# Patient Record
Sex: MALE | Race: WHITE | NOT HISPANIC OR LATINO | Employment: FULL TIME | URBAN - METROPOLITAN AREA
[De-identification: names, ages, dates, MRNs, and addresses within clinical notes are randomized per-mention and may not be internally consistent; named-entity substitution may affect disease eponyms.]

---

## 2017-03-08 PROCEDURE — 82274 ASSAY TEST FOR BLOOD FECAL: CPT | Performed by: FAMILY MEDICINE

## 2017-03-09 DIAGNOSIS — Z12.11 COLON CANCER SCREENING: ICD-10-CM

## 2017-03-10 LAB — HEMOCCULT STL QL IA: POSITIVE

## 2017-03-11 ENCOUNTER — TELEPHONE (OUTPATIENT)
Dept: FAMILY MEDICINE | Facility: CLINIC | Age: 67
End: 2017-03-11

## 2017-03-11 DIAGNOSIS — R19.5 POSITIVE FIT (FECAL IMMUNOCHEMICAL TEST): Primary | ICD-10-CM

## 2017-03-11 NOTE — PROGRESS NOTES
Please call and advise patient his FIT test is positive. He should undergo a colonoscopy to further evaluate this. I will make a referral for him for that.    Andrea Aquino MD

## 2017-03-13 NOTE — TELEPHONE ENCOUNTER
Attempted to call patient at [phone], no answer.   Left VM to return call to RN triage line.     Cristina Leon RN  [facility]

## 2017-03-13 NOTE — TELEPHONE ENCOUNTER
Received the following message from Dr Aquino: Please call and advise patient his FIT test is positive. He should undergo a colonoscopy to further evaluate this. I will make a referral for him for that.     TC faxed referral to [facility].

## 2017-03-14 NOTE — TELEPHONE ENCOUNTER
Attempted to call patient at [phone] (home), no answer.  Left VM to return call to RN Triage line.    Roxi Mart RN  [facility]

## 2017-03-15 NOTE — TELEPHONE ENCOUNTER
Patient called back, I advised him of the positive FIT test and advised him of colonoscopy referral recommended.  He does not want to do this, has never had one, does not ever want to do one.  He is not able to articulate what it is that concerns him regarding having this done.  I did briefly describe the prep and test to him.    I advised patient that failing to identify colon cancer could be a fatal mistake, he verbalized understanding and continues to decline testing.  Routed to Dr. Aquino, would a repeat FIT test be advised in the near future?  Cassy Jones RN  [facility]

## 2017-03-15 NOTE — TELEPHONE ENCOUNTER
Patient called back.  Stated to call him at number below or [phone]  Zeinab Carroll RN [facility] Triage.

## 2017-03-15 NOTE — TELEPHONE ENCOUNTER
Patient called back and stated to call him back at [phone]  Zeinab Carroll RN [facility] Triage.

## 2017-03-15 NOTE — TELEPHONE ENCOUNTER
Attempted to call patient at [phone] number, left message on answering service requesting call back to clinic to discuss.  Cassy Jones RN  [facility]

## 2017-03-16 NOTE — TELEPHONE ENCOUNTER
I called the patient myself and advised him of the recommendation to undergo colonoscopy to further evaluate his positive FIT test. I was very clear that this could be due to colon cancer or something else serious. I strongly recommended the colonoscopy, but at this point he declines. I would not recommend doing another FIT test anytime soon, as I would still have the same recommendation to have him undergo a colonoscopy regardless of what the next FIT test would show in the near future.   I would just recommend another FIT test in 1 year if he chooses not to undergo the colonoscopy at this time. I asked him to call us if he changes his mind about the colonoscopy.

## 2017-09-27 ENCOUNTER — TRANSFERRED RECORDS (OUTPATIENT)
Dept: HEALTH INFORMATION MANAGEMENT | Facility: CLINIC | Age: 67
End: 2017-09-27

## 2017-11-20 ENCOUNTER — OFFICE VISIT (OUTPATIENT)
Dept: FAMILY MEDICINE | Facility: CLINIC | Age: 67
End: 2017-11-20
Payer: COMMERCIAL

## 2017-11-20 VITALS
BODY MASS INDEX: 25.62 KG/M2 | HEIGHT: 69 IN | HEART RATE: 69 BPM | WEIGHT: 173 LBS | RESPIRATION RATE: 16 BRPM | DIASTOLIC BLOOD PRESSURE: 88 MMHG | TEMPERATURE: 97 F | SYSTOLIC BLOOD PRESSURE: 138 MMHG | OXYGEN SATURATION: 99 %

## 2017-11-20 DIAGNOSIS — I48.0 PAF (PAROXYSMAL ATRIAL FIBRILLATION) (H): ICD-10-CM

## 2017-11-20 DIAGNOSIS — E78.5 HYPERLIPIDEMIA LDL GOAL <70: ICD-10-CM

## 2017-11-20 DIAGNOSIS — I25.2 OLD MYOCARDIAL INFARCTION: ICD-10-CM

## 2017-11-20 DIAGNOSIS — Z12.11 SPECIAL SCREENING FOR MALIGNANT NEOPLASMS, COLON: ICD-10-CM

## 2017-11-20 DIAGNOSIS — R39.15 URINARY URGENCY: Primary | ICD-10-CM

## 2017-11-20 DIAGNOSIS — R31.0 GROSS HEMATURIA: ICD-10-CM

## 2017-11-20 LAB
ALBUMIN UR-MCNC: NEGATIVE MG/DL
APPEARANCE UR: CLEAR
BILIRUB UR QL STRIP: NEGATIVE
COLOR UR AUTO: YELLOW
GLUCOSE UR STRIP-MCNC: NEGATIVE MG/DL
HGB UR QL STRIP: NEGATIVE
KETONES UR STRIP-MCNC: NEGATIVE MG/DL
LEUKOCYTE ESTERASE UR QL STRIP: NEGATIVE
NITRATE UR QL: NEGATIVE
PH UR STRIP: 6 PH (ref 5–7)
SOURCE: NORMAL
SP GR UR STRIP: <=1.005 (ref 1–1.03)
UROBILINOGEN UR STRIP-ACNC: 0.2 EU/DL (ref 0.2–1)

## 2017-11-20 PROCEDURE — 87086 URINE CULTURE/COLONY COUNT: CPT | Performed by: FAMILY MEDICINE

## 2017-11-20 PROCEDURE — 81003 URINALYSIS AUTO W/O SCOPE: CPT | Performed by: FAMILY MEDICINE

## 2017-11-20 PROCEDURE — 99214 OFFICE O/P EST MOD 30 MIN: CPT | Performed by: FAMILY MEDICINE

## 2017-11-20 ASSESSMENT — PAIN SCALES - GENERAL: PAINLEVEL: NO PAIN (0)

## 2017-11-20 NOTE — PROGRESS NOTES
"  SUBJECTIVE:   Andre Alas is a 67 year old male on anticoagulant for history of CVA and atrial fibrillation who presents to clinic today for the following health issues:    Genitourinary symptoms      Duration: 1 day     Description:  urgency and hematuria    Intensity:  mild    Accompanying signs and symptoms (fever/discharge/nausea/vomiting/back or abdominal pain):  None    History (frequent UTI's/kidney stones/prostate problems): enlarged prostate  Sexually active: YES    Precipitating or alleviating factors: None    Therapies tried and outcome: none   Outcome: none    ROS:  C: NEGATIVE for fever, chills, change in weight  I: NEGATIVE for worrisome rashes, moles or lesions  R: NEGATIVE for significant cough or SOB  CV: NEGATIVE for chest pain, palpitations or peripheral edema   male :hematuria and urgency   M: NEGATIVE for significant arthralgias or myalgia  N: NEGATIVE for weakness, dizziness or paresthesias  HEME/ALLERGY/IMMUNE: anticoagulation     I have reviewed the patient's medical history in detail and updated the computerized patient record.     OBJECTIVE:     /88  Pulse 69  Temp 97  F (36.1  C) (Oral)  Resp 16  Ht 5' 9.25\" (1.759 m)  Wt 173 lb (78.5 kg)  SpO2 99%  BMI 25.36 kg/m2  Body mass index is 25.36 kg/(m^2).  GENERAL: healthy, alert and no distress  EYES: Eyes grossly normal to inspection, PERRL and conjunctivae and sclerae normal  NECK: no adenopathy, no asymmetry, masses, or scars and thyroid normal to palpation  RESP: lungs clear to auscultation - no rales, rhonchi or wheezes  CV: regular rate and rhythm, normal S1 S2, no S3 or S4, no murmur, click or rub, no peripheral edema and peripheral pulses strong  ABDOMEN: soft, nontender, no hepatosplenomegaly, no masses and bowel sounds normal  MS: extremities normal- no gross deformities noted  PSYCH: mentation appears normal, affect normal/bright    Diagnostic Test Results:  Results for orders placed or performed in visit on " 11/20/17   *UA reflex to Microscopic and Culture ([place] [facility] (except [place] and [place])   Result Value Ref Range    Color Urine Yellow     Appearance Urine Clear     Glucose Urine Negative NEG^Negative mg/dL    Bilirubin Urine Negative NEG^Negative    Ketones Urine Negative NEG^Negative mg/dL    Specific Gravity Urine <=1.005 1.003 - 1.035    Blood Urine Negative NEG^Negative    pH Urine 6.0 5.0 - 7.0 pH    Protein Albumin Urine Negative NEG^Negative mg/dL    Urobilinogen Urine 0.2 0.2 - 1.0 EU/dL    Nitrite Urine Negative NEG^Negative    Leukocyte Esterase Urine Negative NEG^Negative    Source Midstream Urine        ASSESSMENT/PLAN:   (R39.15) Urinary urgency  (primary encounter diagnosis)  (R31.0) Gross hematuria  Comment: no history of smoking   Plan: UROLOGY ADULT REFERRAL, CT Abdomen Pelvis         Hematuria w/wo IV Contrast           (I25.2) Old myocardial infarction  Comment: under care of outside cardiologist   Plan: ASPIRIN NOT PRESCRIBED (INTENTIONAL), Lipid         panel reflex to direct LDL Fasting,         Comprehensive metabolic panel        Likely needs statin     (I48.0) PAF (paroxysmal atrial fibrillation) (H)  Comment: rate controlled and anticoagulated     (E78.5) Hyperlipidemia LDL goal <70  Plan: Lipid panel reflex to direct LDL Fasting, TSH         with free T4 reflex, Comprehensive metabolic         panel        Recommended follow-up     (Z12.11) Special screening for malignant neoplasms, colon  Plan: Fecal colorectal cancer screen (FIT)        Recommended colonoscopy       See Patient Instructions    Marybeth Gaffney MD  [facility]

## 2017-11-20 NOTE — NURSING NOTE
"Chief Complaint   Patient presents with     UTI       Initial /88  Pulse 69  Temp 97  F (36.1  C) (Oral)  Resp 16  Ht 5' 9.25\" (1.759 m)  Wt 173 lb (78.5 kg)  SpO2 99%  BMI 25.36 kg/m2 Estimated body mass index is 25.36 kg/(m^2) as calculated from the following:    Height as of this encounter: 5' 9.25\" (1.759 m).    Weight as of this encounter: 173 lb (78.5 kg).  Medication Reconciliation: complete   Cassy Frost CMA (AAMA)      "

## 2017-11-20 NOTE — LETTER
[facility]  [address] [place] [address]    November 21, 2017    Andre Alas  [address] [place] [address]          Dear Andre,    Your results are normal.    Enclosed is a copy of your results.     Results for orders placed or performed in visit on 11/20/17   *UA reflex to Microscopic and Culture ([place] [facility] (except [place] and [place])   Result Value Ref Range    Color Urine Yellow     Appearance Urine Clear     Glucose Urine Negative NEG^Negative mg/dL    Bilirubin Urine Negative NEG^Negative    Ketones Urine Negative NEG^Negative mg/dL    Specific Gravity Urine <=1.005 1.003 - 1.035    Blood Urine Negative NEG^Negative    pH Urine 6.0 5.0 - 7.0 pH    Protein Albumin Urine Negative NEG^Negative mg/dL    Urobilinogen Urine 0.2 0.2 - 1.0 EU/dL    Nitrite Urine Negative NEG^Negative    Leukocyte Esterase Urine Negative NEG^Negative    Source Midstream Urine    Urine Culture Aerobic Bacterial   Result Value Ref Range    Specimen Description Midstream Urine     Culture Micro No beta hemolytic Streptococcus Group A isolated        If you have any questions or concerns, please call myself or my nurse at [phone].      Sincerely,        Marybeth Gaffney MD/ha

## 2017-11-20 NOTE — PATIENT INSTRUCTIONS
Call the imaging center at [phone] or  [phone] to schedule your CT.    [facility]    If you have any questions regarding to your visit please contact your care team:       Team Red:   Clinic Hours Telephone Number   Dr. Marybeth Ortega, NP   7am-7pm  Monday - Thursday   7am-5pm  Fridays  [phone]  (Appointment scheduling available 24/7)    Questions about your visit?   Team Line  [phone]   Urgent Care - [place] - 11am-9pm Monday-Friday Saturday-Sunday- 9am-5pm   [place] - 5pm-9pm Monday-Friday Saturday-Sunday- 9am-5pm  [phone] - [facility]  [phone] - [place]       What options do I have for visits at the clinic other than the traditional office visit?  To expand how we care for you, many of our providers are utilizing electronic visits (e-visits) and telephone visits, when medically appropriate, for interactions with their patients rather than a visit in the clinic.   We also offer nurse visits for many medical concerns. Just like any other service, we will bill your insurance company for this type of visit based on time spent on the phone with your provider. Not all insurance companies cover these visits. Please check with your medical insurance if this type of visit is covered. You will be responsible for any charges that are not paid by your insurance.      E-visits via [employer]:  generally incur a $35.00 fee.  Telephone visits:  Time spent on the phone: *charged based on time that is spent on the phone in increments of 10 minutes. Estimated cost:   5-10 mins $30.00   11-20 mins. $59.00   21-30 mins. $85.00     Use [employer] (secure email communication and access to your chart) to send your primary care provider a message or make an appointment. Ask someone on your Team how to sign up for [employer].  For a Price Quote for your services, please call our Consumer Price Line at  [phone].      As always, Thank you for trusting us with your health care needs!  Discharged by DIEGO Hernandez

## 2017-11-20 NOTE — MR AVS SNAPSHOT
After Visit Summary   11/20/2017    Andre Alas    MRN: [id number]           Patient Information     Date Of Birth          1950        Visit Information        Provider Department      11/20/2017 9:00 AM Marybeth Gaffney MD [facility]        Today's Diagnoses     Urinary urgency    -  1    Gross hematuria        Old myocardial infarction        PAF (paroxysmal atrial fibrillation) (H)        Hyperlipidemia LDL goal <70        Special screening for malignant neoplasms, colon          Care Instructions    Call the imaging center at [phone] or  [phone] to schedule your CT.    [facility]    If you have any questions regarding to your visit please contact your care team:       Team Red:   Clinic Hours Telephone Number   Dr. Marybeth Ortega NP   7am-7pm  Monday - Thursday   7am-5pm  Fridays  [phone]  (Appointment scheduling available 24/7)    Questions about your visit?   Team Line  [phone]   Urgent Care - [place] and [place] - 11am-9pm Monday-Friday Saturday-Sunday- 9am-5pm   [place] - 5pm-9pm Monday-Friday Saturday-Sunday- 9am-5pm  [phone] - [place]   [phone] - [place]       What options do I have for visits at the clinic other than the traditional office visit?  To expand how we care for you, many of our providers are utilizing electronic visits (e-visits) and telephone visits, when medically appropriate, for interactions with their patients rather than a visit in the clinic.   We also offer nurse visits for many medical concerns. Just like any other service, we will bill your insurance company for this type of visit based on time spent on the phone with your provider. Not all insurance companies cover these visits. Please check with your medical insurance if this type of visit is covered. You will be responsible for any charges that are not paid by your  insurance.      E-visits via [employer]:  generally incur a $35.00 fee.  Telephone visits:  Time spent on the phone: *charged based on time that is spent on the phone in increments of 10 minutes. Estimated cost:   5-10 mins $30.00   11-20 mins. $59.00   21-30 mins. $85.00     Use [employer] (secure email communication and access to your chart) to send your primary care provider a message or make an appointment. Ask someone on your Team how to sign up for [employer].  For a Price Quote for your services, please call our [employer] Price Line at [phone].      As always, Thank you for trusting us with your health care needs!  Discharged by DIEGO Hernandez            Follow-ups after your visit        Additional Services     UROLOGY ADULT REFERRAL       Your provider has referred you to: [facility]: [facility] [place] [phone]   [email or web address]    Please be aware that coverage of these services is subject to the terms and limitations of your health insurance plan.  Call member services at your health plan with any benefit or coverage questions.      Please bring the following with you to your appointment:    (1) Any X-Rays, CTs or MRIs which have been performed.  Contact the facility where they were done to arrange for  prior to your scheduled appointment.    (2) List of current medications  (3) This referral request   (4) Any documents/labs given to you for this referral                  Follow-up notes from your care team     Return if symptoms worsen or fail to improve, for Wellness visit (fasting labs up to one week prior).      Future tests that were ordered for you today     Open Future Orders        Priority Expected Expires Ordered    CT Abdomen Pelvis Hematuria w/wo IV Contrast Routine  1/4/2018 11/20/2017    Fecal colorectal cancer screen (FIT) Routine 12/11/2017 2/12/2018 11/20/2017    Lipid panel reflex to direct LDL Fasting Routine 12/11/2017 11/20/2018 11/20/2017    TSH  "with free T4 reflex Routine 2017    Comprehensive metabolic panel Routine 2017            Who to contact     If you have questions or need follow up information about today's clinic visit or your schedule please contact [facility] directly at [phone].  Normal or non-critical lab and imaging results will be communicated to you by MyChart, letter or phone within 4 business days after the clinic has received the results. If you do not hear from us within 7 days, please contact the clinic through [employer] or phone. If you have a critical or abnormal lab result, we will notify you by phone as soon as possible.  Submit refill requests through [employer] or call your pharmacy and they will forward the refill request to us. Please allow 3 business days for your refill to be completed.          Additional Information About Your Visit        [employer] Information     [employer] lets you send messages to your doctor, view your test results, renew your prescriptions, schedule appointments and more. To sign up, go to [email or web address] . Click on \"Log in\" on the left side of the screen, which will take you to the Welcome page. Then click on \"Sign up Now\" on the right side of the page.     You will be asked to enter the access code listed below, as well as some personal information. Please follow the directions to create your username and password.     Your access code is: [email or web address]  Expires: 2018  9:59 AM     Your access code will  in 90 days. If you need help or a new code, please call your [place] clinic or [phone].        Care EveryWhere ID     This is your Care EveryWhere ID. This could be used by other organizations to access your [place] medical records  [id number]        Your Vitals Were     Pulse Temperature Respirations Height Pulse Oximetry BMI (Body Mass Index)    69 97  F (36.1  C) (Oral) 16 5' 9.25\" (1.759 m) 99% 25.36 " kg/m2       Blood Pressure from Last 3 Encounters:   11/20/17 138/88   07/07/15 132/86   07/03/15 130/79    Weight from Last 3 Encounters:   11/20/17 173 lb (78.5 kg)   07/07/15 167 lb (75.8 kg)   07/03/15 167 lb (75.8 kg)              We Performed the Following     *UA reflex to Microscopic and Culture ([place] [facility] (except [place] and [place])     Urine Culture Aerobic Bacterial     UROLOGY ADULT REFERRAL          Today's Medication Changes          These changes are accurate as of: 11/20/17  9:59 AM.  If you have any questions, ask your nurse or doctor.               Start taking these medicines.        Dose/Directions    ASPIRIN NOT PRESCRIBED   Commonly known as:  INTENTIONAL   Used for:  Old myocardial infarction   Started by:  Marybeth Gaffney MD        Please choose reason not prescribed, below   Quantity:  0 each   Refills:  0            Where to get your medicines      Some of these will need a paper prescription and others can be bought over the counter.  Ask your nurse if you have questions.     You don't need a prescription for these medications     ASPIRIN NOT PRESCRIBED                Primary Care Provider Office Phone # Fax #    Marybeth Gaffney [phone] [address] [address]        Equal Access to Services     ROBERT HINOJOSA AH: Hadii shamar ku hadasho Soomaali, waaxda luqadaha, qaybta kaalmada adeegyada, clara elise haydavida cavazos. So [facility] [phone].    ATENCIÓN: Si habla español, tiene a espino disposición servicios gratuitos de asistencia lingüística. Llame al [phone].    We comply with applicable federal civil rights laws and [place] laws. We do not discriminate on the basis of race, color, national origin, age, disability, sex, sexual orientation, or gender identity.            Thank you!     Thank you for choosing [facility]  for your care. Our goal is always to provide you with excellent care. Hearing  back from our patients is one way we can continue to improve our services. Please take a few minutes to complete the written survey that you may receive in the mail after your visit with us. Thank you!             Your Updated Medication List - Protect others around you: Learn how to safely use, store and throw away your medicines at www.disposemymeds.org.          This list is accurate as of: 11/20/17  9:59 AM.  Always use your most recent med list.                   Brand Name Dispense Instructions for use Diagnosis    ASPIRIN NOT PRESCRIBED    INTENTIONAL    0 each    Please choose reason not prescribed, below    Old myocardial infarction       rivaroxaban ANTICOAGULANT 20 MG Tabs tablet    XARELTO     Take 15 mg by mouth daily (with dinner)    PAF (paroxysmal atrial fibrillation) (H)       VITAMIN B COMPLEX PO      Take 2 tablets by mouth. Every 2 weeks        VITAMIN C PO      Take 3 tablets by mouth. Every 2 weeks        vitamin E 200 UNIT capsule      Take 2 capsules by mouth. Every 2 weeks        Zinc Gluconate 100 MG Tabs      Take 2 tablets by mouth. Every 2 weeks

## 2017-11-21 LAB
BACTERIA SPEC CULT: NORMAL
SPECIMEN SOURCE: NORMAL

## 2017-11-22 ENCOUNTER — RADIANT APPOINTMENT (OUTPATIENT)
Dept: CT IMAGING | Facility: CLINIC | Age: 67
End: 2017-11-22
Attending: FAMILY MEDICINE
Payer: COMMERCIAL

## 2017-11-22 DIAGNOSIS — R93.89 ABNORMAL CAT SCAN: ICD-10-CM

## 2017-11-22 DIAGNOSIS — R39.15 URINARY URGENCY: Primary | ICD-10-CM

## 2017-11-22 DIAGNOSIS — R31.0 GROSS HEMATURIA: ICD-10-CM

## 2017-11-22 DIAGNOSIS — R19.5 POSITIVE FIT (FECAL IMMUNOCHEMICAL TEST): ICD-10-CM

## 2017-11-22 PROCEDURE — 74178 CT ABD&PLV WO CNTR FLWD CNTR: CPT | Mod: TC

## 2017-11-22 RX ORDER — IOPAMIDOL 755 MG/ML
95 INJECTION, SOLUTION INTRAVASCULAR ONCE
Status: COMPLETED | OUTPATIENT
Start: 2017-11-22 | End: 2017-11-22

## 2017-11-22 RX ADMIN — IOPAMIDOL 95 ML: 755 INJECTION, SOLUTION INTRAVASCULAR at 10:37

## 2017-11-22 NOTE — PROGRESS NOTES
Please call patient:  No cause for blood in your urine was found. See the urologist to complete evaluation of this.     The wall of part of your colon appears thick -- you do need to follow through with colonoscopy soon to follow-up the abnormal stool card, to be sure that this is not cancer. A  will call you to arrange.     Marybeth Gaffney MD

## 2017-12-05 ENCOUNTER — OFFICE VISIT (OUTPATIENT)
Dept: UROLOGY | Facility: CLINIC | Age: 67
End: 2017-12-05
Payer: COMMERCIAL

## 2017-12-05 VITALS — RESPIRATION RATE: 12 BRPM | SYSTOLIC BLOOD PRESSURE: 130 MMHG | HEART RATE: 62 BPM | DIASTOLIC BLOOD PRESSURE: 78 MMHG

## 2017-12-05 DIAGNOSIS — N40.1 BENIGN PROSTATIC HYPERPLASIA WITH URINARY RETENTION: Primary | ICD-10-CM

## 2017-12-05 DIAGNOSIS — R33.8 BENIGN PROSTATIC HYPERPLASIA WITH URINARY RETENTION: Primary | ICD-10-CM

## 2017-12-05 DIAGNOSIS — R31.0 GROSS HEMATURIA: ICD-10-CM

## 2017-12-05 LAB
ALBUMIN UR-MCNC: NEGATIVE MG/DL
APPEARANCE UR: CLEAR
BILIRUB UR QL STRIP: NEGATIVE
COLOR UR AUTO: YELLOW
GLUCOSE UR STRIP-MCNC: NEGATIVE MG/DL
HGB UR QL STRIP: NEGATIVE
KETONES UR STRIP-MCNC: NEGATIVE MG/DL
LEUKOCYTE ESTERASE UR QL STRIP: NEGATIVE
NITRATE UR QL: NEGATIVE
PH UR STRIP: 6 PH (ref 5–7)
PSA SERPL-MCNC: 10.7 UG/L (ref 0–4)
SOURCE: NORMAL
SP GR UR STRIP: <=1.005 (ref 1–1.03)
UROBILINOGEN UR STRIP-ACNC: 0.2 EU/DL (ref 0.2–1)

## 2017-12-05 PROCEDURE — 81003 URINALYSIS AUTO W/O SCOPE: CPT | Performed by: UROLOGY

## 2017-12-05 PROCEDURE — 84153 ASSAY OF PSA TOTAL: CPT | Performed by: UROLOGY

## 2017-12-05 PROCEDURE — 36415 COLL VENOUS BLD VENIPUNCTURE: CPT | Performed by: UROLOGY

## 2017-12-05 PROCEDURE — 99204 OFFICE O/P NEW MOD 45 MIN: CPT | Mod: 25 | Performed by: UROLOGY

## 2017-12-05 PROCEDURE — 51798 US URINE CAPACITY MEASURE: CPT | Performed by: UROLOGY

## 2017-12-05 NOTE — MR AVS SNAPSHOT
After Visit Summary   12/5/2017    Andre Alas    MRN: [id number]           Patient Information     Date Of Birth          1950        Visit Information        Provider Department      12/5/2017 11:15 AM Henry Guadarrama MD [facility]        Today's Diagnoses     Benign prostatic hyperplasia with urinary retention    -  1    Gross hematuria          Care Instructions    Your next cystoscopy is scheduled 1/8/2018 @ 8:15 am Please call  if you need to reschedule this appointment.      Cystoscopy    Cystoscopy is a procedure that lets your doctor look directly inside your urethra and bladder. It can be used to:    Help diagnose a problem with your urethra, bladder, or kidneys.    Take a sample (biopsy) of bladder or urethral tissue.    Treat certain problems (such as removing kidney stones).    Place a stent to bypass an obstruction.    Take special X-rays of the kidneys.  Based on the findings, your doctor may recommend other tests or treatments.  What is a cystoscope?  A cystoscope is a telescope-like instrument that contains lenses and fiberoptics (small glass wires that make bright light). The cystoscope may be straight and rigid, or flexible to bend around curves in the urethra. The doctor may look directly into the cystoscope, or project the image onto a monitor.  Getting ready    Ask your doctor if you should stop taking any medicines before the procedure.    Ask whether you should avoid eating or drinking anything after midnight before the procedure.    Follow any other instructions your doctor gives you.  Tell your doctor before the exam if you:    Take any medicines, such as aspirin or blood thinners    Have allergies to any medicines    Are pregnant   The procedure  Cystoscopy is done in the doctor s office, surgery center, or hospital. The doctor and a nurse are present during the procedure. It takes only a few minutes, longer if a biopsy, X-ray, or  treatment needs to be done.  During the procedure:    You lie on an exam table on your back, knees bent and legs apart. You are covered with a drape.    Your urethra and the area around it are washed. Anesthetic jelly may be applied to numb the urethra. Other pain medicine is usually not needed. In some cases, you may be offered a mild sedative to help you relax. If a more extensive procedure is to be done, such as a biopsy or kidney stone removal, general anesthesia may be needed.    The cystoscope is inserted. A sterile fluid is put into the bladder to expand it. You may feel pressure from this fluid.    When the procedure is done, the cystoscope is removed.  After the procedure  If you had a sedative, general anesthesia, or spinal anesthesia, you must have someone drive you home. Once you re home:    Drink plenty of fluids.    You may have burning or light bleeding when you urinate--this is normal.    Medicines may be prescribed to ease any discomfort or prevent infection. Take these as directed.    Call your doctor if you have heavy bleeding or blood clots, burning that lasts more than a day, a fever over 100 F  (38  C), or trouble urinating.  Date Last Reviewed: 1/1/2017 2000-2017 [employer]. [address] [place] [address]. All rights reserved. This information is not intended as a substitute for professional medical care. Always follow your healthcare professional's instructions.                Follow-ups after your visit        Your next 10 appointments already scheduled     Jan 08, 2018  8:15 AM CST   Return Visit with Henry Guadarrama MD, [place] CYSTO PROC ROOM   [place] [facility] ([facility] [place]  [place] [address] [phone]              Who to contact     If you have questions or need follow up information about today's clinic visit or your schedule please contact [facility] directly at [phone].  Normal  "or non-critical lab and imaging results will be communicated to you by MyChart, letter or phone within 4 business days after the clinic has received the results. If you do not hear from us within 7 days, please contact the clinic through [employer] or phone. If you have a critical or abnormal lab result, we will notify you by phone as soon as possible.  Submit refill requests through [employer] or call your pharmacy and they will forward the refill request to us. Please allow 3 business days for your refill to be completed.          Additional Information About Your Visit        [facility] Information     [employer] lets you send messages to your doctor, view your test results, renew your prescriptions, schedule appointments and more. To sign up, go to [email or web address] . Click on \"Log in\" on the left side of the screen, which will take you to the Welcome page. Then click on \"Sign up Now\" on the right side of the page.     You will be asked to enter the access code listed below, as well as some personal information. Please follow the directions to create your username and password.     Your access code is: [email or web address]  Expires: 2018  9:59 AM     Your access code will  in 90 days. If you need help or a new code, please call your [place] clinic or [phone].        Care EveryWhere ID     This is your Care EveryWhere ID. This could be used by other organizations to access your [place] medical records  [id number]        Your Vitals Were     Pulse Respirations                62 12           Blood Pressure from Last 3 Encounters:   17 130/78   17 138/88   07/07/15 132/86    Weight from Last 3 Encounters:   17 78.5 kg (173 lb)   07/07/15 75.8 kg (167 lb)   07/03/15 75.8 kg (167 lb)              We Performed the Following     MEASURE POST-VOID RESIDUAL URINE/BLADDER CAPACITY, US NON-IMAGING     PSA tumor marker     UA reflex to Microscopic and Culture        Primary Care Provider Office Phone " # Fax #    Marybeth Gaffney [phone] [address] [facility]  [place] [address]        Equal Access to Services     USMANROBERT MICAELA : Hadii aad ku hadmarywilian Soricki, watonyda luqadaha, qashivata kaalmada jona, clara avila lachandraderic cavazos. So [facility] [phone].    ATENCIÓN: Si habla español, tiene a espino disposición servicios gratuitos de asistencia lingüística. Llame al [phone].    We comply with applicable federal civil rights laws and [place] laws. We do not discriminate on the basis of race, color, national origin, age, disability, sex, sexual orientation, or gender identity.            Thank you!     Thank you for choosing [facility]  for your care. Our goal is always to provide you with excellent care. Hearing back from our patients is one way we can continue to improve our services. Please take a few minutes to complete the written survey that you may receive in the mail after your visit with us. Thank you!             Your Updated Medication List - Protect others around you: Learn how to safely use, store and throw away your medicines at www.disposemymeds.org.          This list is accurate as of: 12/5/17 11:45 AM.  Always use your most recent med list.                   Brand Name Dispense Instructions for use Diagnosis    ASPIRIN NOT PRESCRIBED    INTENTIONAL    0 each    Please choose reason not prescribed, below    Old myocardial infarction       rivaroxaban ANTICOAGULANT 20 MG Tabs tablet    XARELTO     Take 15 mg by mouth daily (with dinner)    PAF (paroxysmal atrial fibrillation) (H)       VITAMIN B COMPLEX PO      Take 2 tablets by mouth. Every 2 weeks        VITAMIN C PO      Take 3 tablets by mouth. Every 2 weeks        vitamin E 200 UNIT capsule      Take 2 capsules by mouth. Every 2 weeks        Zinc Gluconate 100 MG Tabs      Take 2 tablets by mouth. Every 2 weeks

## 2017-12-05 NOTE — PATIENT INSTRUCTIONS
Your next cystoscopy is scheduled 1/8/2018 @ 8:15 am Please call  if you need to reschedule this appointment.      Cystoscopy    Cystoscopy is a procedure that lets your doctor look directly inside your urethra and bladder. It can be used to:    Help diagnose a problem with your urethra, bladder, or kidneys.    Take a sample (biopsy) of bladder or urethral tissue.    Treat certain problems (such as removing kidney stones).    Place a stent to bypass an obstruction.    Take special X-rays of the kidneys.  Based on the findings, your doctor may recommend other tests or treatments.  What is a cystoscope?  A cystoscope is a telescope-like instrument that contains lenses and fiberoptics (small glass wires that make bright light). The cystoscope may be straight and rigid, or flexible to bend around curves in the urethra. The doctor may look directly into the cystoscope, or project the image onto a monitor.  Getting ready    Ask your doctor if you should stop taking any medicines before the procedure.    Ask whether you should avoid eating or drinking anything after midnight before the procedure.    Follow any other instructions your doctor gives you.  Tell your doctor before the exam if you:    Take any medicines, such as aspirin or blood thinners    Have allergies to any medicines    Are pregnant   The procedure  Cystoscopy is done in the doctor s office, surgery center, or hospital. The doctor and a nurse are present during the procedure. It takes only a few minutes, longer if a biopsy, X-ray, or treatment needs to be done.  During the procedure:    You lie on an exam table on your back, knees bent and legs apart. You are covered with a drape.    Your urethra and the area around it are washed. Anesthetic jelly may be applied to numb the urethra. Other pain medicine is usually not needed. In some cases, you may be offered a mild sedative to help you relax. If a more extensive procedure is to be done, such as  a biopsy or kidney stone removal, general anesthesia may be needed.    The cystoscope is inserted. A sterile fluid is put into the bladder to expand it. You may feel pressure from this fluid.    When the procedure is done, the cystoscope is removed.  After the procedure  If you had a sedative, general anesthesia, or spinal anesthesia, you must have someone drive you home. Once you re home:    Drink plenty of fluids.    You may have burning or light bleeding when you urinate this is normal.    Medicines may be prescribed to ease any discomfort or prevent infection. Take these as directed.    Call your doctor if you have heavy bleeding or blood clots, burning that lasts more than a day, a fever over 100 F  (38  C), or trouble urinating.  Date Last Reviewed: 1/1/2017 2000-2017 [employer]. [address] [place] [address], [place] [address]. All rights reserved. This information is not intended as a substitute for professional medical care. Always follow your healthcare professional's instructions.

## 2017-12-05 NOTE — PROGRESS NOTES
"Bladder Scan performed. >616 maximum residual urine detected after 3 scans. MD informed   Erica Doty RN     Chief Complaint   Patient presents with     Hematuria     Urgency       Initial /78 (BP Location: Right arm, Patient Position: Chair, Cuff Size: Adult Large)  Pulse 62  Resp 12 Estimated body mass index is 25.36 kg/(m^2) as calculated from the following:    Height as of 11/20/17: 1.759 m (5' 9.25\").    Weight as of 11/20/17: 78.5 kg (173 lb).  Medication Reconciliation: complete   Erica Doty RN       "

## 2017-12-05 NOTE — PROGRESS NOTES
S: Andre Alas is a pleasant  67 year old male who was requested to be seen by  Marybeth Gaffney for a consult with regard to patient's urinary complaints and recently some gross hematuria.  Patient complains of Strain to Urinate, Nocturia x 1, Urgency, Frequency and Intermittency.  He has no history of elevated PSA.  Symptoms have been on going for   many years(s).  Seems to be worsened over time.  His AUA Symptom Score:  17.  His QOL score:  2.  He has noticed some gross hematuria recently also.  His UA was negative.  Recent CT urogram was negative.    Current Outpatient Prescriptions   Medication Sig Dispense Refill     ASPIRIN NOT PRESCRIBED (INTENTIONAL) Please choose reason not prescribed, below 0 each 0     rivaroxaban ANTICOAGULANT (XARELTO) 20 MG TABS tablet Take 15 mg by mouth daily (with dinner)        Ascorbic Acid (VITAMIN C PO) Take 3 tablets by mouth. Every 2 weeks       B Complex Vitamins (VITAMIN B COMPLEX PO) Take 2 tablets by mouth. Every 2 weeks       Zinc Gluconate (ZINC) 100 MG TABS Take 2 tablets by mouth. Every 2 weeks       vitamin E 200 UNIT capsule Take 2 capsules by mouth. Every 2 weeks       Allergies   Allergen Reactions     Dust Mites      Mold      Past Medical History:   Diagnosis Date     Allergic rhinitis due to dust 11/29/2011     BPH without urinary obstruction 11/29/2011     CVA (cerebral vascular accident) (H) 07/2014    embolic 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     Hyperlipidemia LDL goal <70 11/28/2011     NSTEMI (non-ST elevated myocardial infarction) (H) 7/2014    prob sec to PAF -- no sig CAD     PAF (paroxysmal atrial fibrillation) (H) 7/2014     Past Surgical History:   Procedure Laterality Date     ANGIOGRAM  2014    normal - non ST MI     DENTAL SURGERY  2000     HERNIA REPAIR, INGUINAL RT/LT  1952      Family History   Problem Relation Age of Onset     Heart Failure Mother 73     CEREBROVASCULAR DISEASE Father 70     HEART DISEASE Father 91     Pacemaker     KIDNEY DISEASE Father       stones      Cancer - colorectal No family hx of      Prostate Cancer No family hx of      DIABETES No family hx of      Hypertension No family hx of      He does not have a family history of prostate cancer.  Social History     Social History     Marital status:      Spouse name: Ludmila     Number of children: 2     Years of education: N/A     Occupational History      [employer]     Social History Main Topics     Smoking status: Never Smoker     Smokeless tobacco: Never Used     Alcohol use No     Drug use: No     Sexual activity: Yes     Partners: Female     Birth control/ protection: Condom     Other Topics Concern     Parent/Sibling W/ Cabg, Mi Or Angioplasty Before 65f 55m? No     Social History Narrative        REVIEW OF SYSTEMS  =================  C: NEGATIVE for fever, chills, change in weight  I: NEGATIVE for worrisome rashes, moles or lesions  E/M: NEGATIVE for ear, mouth and throat problems  R: NEGATIVE for significant cough or SHORTNESS OF BREATH  CV:  NEGATIVE for chest pain, palpitations or peripheral edema  GI: NEGATIVE for nausea, abdominal pain, heartburn, or change in bowel habits  NEURO: NEGATIVE numbness/weakness  : see HPI  PSYCH: NEGATIVE depression/anxiety  LYmph: no new enlarged lymph nodes  Ortho: no new trauma/movements           O: Exam:  Constitutional: healthy, alert and no distress  Head: Normocephalic. No masses, lesions, tenderness or abnormalities  ENT: ENT exam normal, no neck nodes or sinus tenderness  Cardiovascular: negative, PMI normal.   Respiratory: negative, no evidence of respiratory distress  Gastrointestinal: Abdomen soft, non-tender. BS normal. No masses, organomegaly  : penis no d/c. Testis no masses.  No scrotal skin lesion.  Prostate large 60 gm smooth no nodule.  Musculoskeletal: extremities normal- no gross deformities noted, gait normal and normal muscle tone  Skin: no suspicious lesions or rashes  Neurologic: Alert and  oriented  Psychiatric: mentation appears normal. and affect normal/bright  Hematologic/Lymphatic/Immunologic: normal ant/post cervical, axillary, supraclavicular and inguinal nodes    Assessment/Plan:   (N40.1,  R33.8) Benign prostatic hyperplasia with urinary retention  (primary encounter diagnosis)  Comment: PVR > 600 cc  Plan:  Patient is in urinary retention, probably chronic           Discussed possible TURP              (R31.0) Gross hematuria  Comment:    Plan: schedule for cysto next

## 2017-12-08 ENCOUNTER — OFFICE VISIT (OUTPATIENT)
Dept: UROLOGY | Facility: CLINIC | Age: 67
End: 2017-12-08
Payer: COMMERCIAL

## 2017-12-08 DIAGNOSIS — R97.20 ELEVATED PROSTATE SPECIFIC ANTIGEN (PSA): Primary | ICD-10-CM

## 2017-12-08 PROCEDURE — 99207 ZZC NO CHARGE NURSE ONLY: CPT

## 2017-12-08 NOTE — PROGRESS NOTES
Patient arrives for biopsy schedule.  Patient does not want to have biopsy scheduled at this time. Patient will come to scheduled appointment for cystoscopy and will discuss further with provider.   Erica Doty RN

## 2017-12-08 NOTE — MR AVS SNAPSHOT
"              After Visit Summary   12/8/2017    Andre Alas    MRN: [id number]           Patient Information     Date Of Birth          1950        Visit Information        Provider Department      12/8/2017 1:15 PM FK UROLOGY [facility]        Today's Diagnoses     Elevated prostate specific antigen (PSA)    -  1       Follow-ups after your visit        Your next 10 appointments already scheduled     Jan 08, 2018  8:15 AM CST   Return Visit with Henry Guadarrama MD, [place] CYSTO PROC ROOM   [facility] ([address] [address] [phone]              Who to contact     If you have questions or need follow up information about today's clinic visit or your schedule please contact [facility] directly at [phone].  Normal or non-critical lab and imaging results will be communicated to you by MyChart, letter or phone within 4 business days after the clinic has received the results. If you do not hear from us within 7 days, please contact the clinic through MyChart or phone. If you have a critical or abnormal lab result, we will notify you by phone as soon as possible.  Submit refill requests through [employer] or call your pharmacy and they will forward the refill request to us. Please allow 3 business days for your refill to be completed.          Additional Information About Your Visit        MyChart Information     [employer] lets you send messages to your doctor, view your test results, renew your prescriptions, schedule appointments and more. To sign up, go to [email or web address] . Click on \"Log in\" on the left side of the screen, which will take you to the Welcome page. Then click on \"Sign up Now\" on the right side of the page.     You will be asked to enter the access code listed below, as well as some personal information. Please follow the directions to create your username and password.     Your " access code is: [email or web address]  Expires: 2018  9:59 AM     Your access code will  in 90 days. If you need help or a new code, please call your [place] clinic or [phone].        Care EveryWhere ID     This is your Care EveryWhere ID. This could be used by other organizations to access your [place] medical records  [id number]         Blood Pressure from Last 3 Encounters:   17 130/78   17 138/88   07/07/15 132/86    Weight from Last 3 Encounters:   17 78.5 kg (173 lb)   07/07/15 75.8 kg (167 lb)   07/03/15 75.8 kg (167 lb)              Today, you had the following     No orders found for display       Primary Care Provider Office Phone # Fax #    Marybeth Gaffney [phone] [address] [facility] [address]        Equal Access to Services     [facility] AH: Hadii aad ku hadasho Soomaali, waaxda luqadaha, qaybta kaalmada adeegyada, waxay idiin hayaan rubyeg kharash la'davida . So [facility] [phone].    ATENCIÓN: Si habla español, tiene a espino disposición servicios gratuitos de asistencia lingüística. Llame al [phone].    We comply with applicable federal civil rights laws and [place] laws. We do not discriminate on the basis of race, color, national origin, age, disability, sex, sexual orientation, or gender identity.            Thank you!     Thank you for choosing [facility]  for your care. Our goal is always to provide you with excellent care. Hearing back from our patients is one way we can continue to improve our services. Please take a few minutes to complete the written survey that you may receive in the mail after your visit with us. Thank you!             Your Updated Medication List - Protect others around you: Learn how to safely use, store and throw away your medicines at www.disposemymeds.org.          This list is accurate as of: 17  1:38 PM.  Always use your most recent med list.                   Brand Name Dispense  Instructions for use Diagnosis    ASPIRIN NOT PRESCRIBED    INTENTIONAL    0 each    Please choose reason not prescribed, below    Old myocardial infarction       rivaroxaban ANTICOAGULANT 20 MG Tabs tablet    XARELTO     Take 15 mg by mouth daily (with dinner)    PAF (paroxysmal atrial fibrillation) (H)       VITAMIN B COMPLEX PO      Take 2 tablets by mouth. Every 2 weeks        VITAMIN C PO      Take 3 tablets by mouth. Every 2 weeks        vitamin E 200 UNIT capsule      Take 2 capsules by mouth. Every 2 weeks        Zinc Gluconate 100 MG Tabs      Take 2 tablets by mouth. Every 2 weeks

## 2017-12-27 ENCOUNTER — TRANSFERRED RECORDS (OUTPATIENT)
Dept: HEALTH INFORMATION MANAGEMENT | Facility: CLINIC | Age: 67
End: 2017-12-27

## 2018-01-08 ENCOUNTER — OFFICE VISIT (OUTPATIENT)
Dept: UROLOGY | Facility: CLINIC | Age: 68
End: 2018-01-08
Payer: COMMERCIAL

## 2018-01-08 VITALS — HEART RATE: 62 BPM | SYSTOLIC BLOOD PRESSURE: 141 MMHG | DIASTOLIC BLOOD PRESSURE: 87 MMHG | OXYGEN SATURATION: 98 %

## 2018-01-08 DIAGNOSIS — R33.8 BENIGN PROSTATIC HYPERPLASIA WITH URINARY RETENTION: ICD-10-CM

## 2018-01-08 DIAGNOSIS — N40.1 BENIGN PROSTATIC HYPERPLASIA WITH URINARY RETENTION: ICD-10-CM

## 2018-01-08 DIAGNOSIS — R31.0 GROSS HEMATURIA: ICD-10-CM

## 2018-01-08 DIAGNOSIS — R03.0 ELEVATED BLOOD PRESSURE READING WITHOUT DIAGNOSIS OF HYPERTENSION: ICD-10-CM

## 2018-01-08 DIAGNOSIS — R97.20 ELEVATED PROSTATE SPECIFIC ANTIGEN (PSA): Primary | ICD-10-CM

## 2018-01-08 PROCEDURE — 87186 SC STD MICRODIL/AGAR DIL: CPT | Performed by: UROLOGY

## 2018-01-08 PROCEDURE — 52000 CYSTOURETHROSCOPY: CPT | Performed by: UROLOGY

## 2018-01-08 PROCEDURE — 87077 CULTURE AEROBIC IDENTIFY: CPT | Performed by: UROLOGY

## 2018-01-08 PROCEDURE — 87070 CULTURE OTHR SPECIMN AEROBIC: CPT | Performed by: UROLOGY

## 2018-01-08 NOTE — PROGRESS NOTES
S: Andre Alas is a 67 year old male returns for incomplete voiding, hematuria.    Patient is draped and prepped.  Flexible cystoscopy placed under direct vision.      The anterior urethra is normal   The prostatic urethra showed trilobar enlargement.     The length is 3cm,  the coaptation is 3 cm.     In the bladder there is trabeculation grade 2.    CT ABDOMEN AND PELVIS HEMATURIA WITH/WITHOUT IV CONTRAST 11/22/2017  10:50 AM      HISTORY: Urinary urgency. Gross hematuria.      TECHNIQUE: Axial images are obtained from the lung bases to the  symphysis without IV contrast. Following the administration of 95 mL  of Isovue 370, additional axial images are obtained from the lung  bases to the symphysis during corticomedullary and excretory phases.  Coronal and sagittal reformatted images are also generated. Radiation  dose for this scan was reduced using automated exposure control,  adjustment of the mA and/or kV according to patient size, or iterative  reconstruction technique.     FINDINGS: The lung bases are clear.     Abdomen: Simple cyst is present in the left hepatic dome on series 3,  image 8 measuring 1.6 cm in diameter. Liver is otherwise unremarkable.  The spleen, gallbladder, pancreas and left adrenal gland are  unremarkable. Right adrenal nodule is indeterminate measuring 1 cm in  diameter on series 3, image 16. The remainder of the liver, spleen,  pancreas and left adrenal gland are unremarkable. Indeterminate right  adrenal nodule measures 1 cm in diameter on series 3, image 16. The  bowel is normal in caliber without obstruction or diverticulitis. Mild  circumferential colonic wall thickening is noted in the region of the  splenic flexure on series 2, image 17 and series 3, image 17.  Peristaltic contraction is possible but malignancy cannot be excluded.     Right urinary tract:  No renal masses or cysts are present. No  collecting system or ureteral stones are present. There is no  hydronephrosis.  Delayed imaging demonstrates a normal right renal  collecting system and ureter. Ureter is normal in caliber and course.     Left urinary tract:  No renal masses or cysts are present. No  collecting system or ureteral stones are present. There is no  hydronephrosis. Delayed imaging demonstrates a normal left renal  collecting system and ureter. Ureter is normal in caliber and course.     Bladder:  No bladder calculi are appreciated. No bladder wall  thickening or nodularity is appreciated. Delayed images show no  evidence of filling defects. Mass effect upon the bladder base is  caused by an enlarged prostate gland.     Pelvis:  Prostate gland is enlarged. Rectum is unremarkable. Calcified  pelvic phleboliths are present. No enlarged pelvic lymph nodes or free  fluid. Bone window examination demonstrates degenerative spine  changes.         IMPRESSION:  1. No evidence of urinary tract calculi or hydronephrosis. No renal  cyst or mass. Collecting systems, ureters and bladder are  unremarkable. Prostate gland is enlarged causing mass effect upon the  bladder base.  2. Circumferential wall thickening in the splenic flexure of the colon  possibly peristalsis but an underlying malignancy cannot be excluded.  Correlate with colonoscopy if not recently performed.  3. Simple appearing left hepatic lobe cyst. No further follow-up  required.     ANKIT ROUSE MD  Assessment/Plan:  (R97.20) Elevated prostate specific antigen (PSA)  (primary encounter diagnosis)  Comment:  Discussed psa elevation/prostate cancer  Plan: recheck psa later on this week           Tentatively scheduled for biopsy            Risks of bleeding/infection discussed    Benign prostatic hyperplasia with retention:  Discussed TURP briefly.  Needs psa evaluation first to r/o prostate cancer.        (R31.0) Gross hematuria  Comment: prostate related  Plan: prn.  He is aware of CT finding about colon.    (R03.0) Elevated blood pressure reading without diagnosis  of hypertension  Comment:    Plan:  Patient to follow up with Primary Care provider regarding elevated blood pressure.

## 2018-01-08 NOTE — MR AVS SNAPSHOT
After Visit Summary   1/8/2018    Andre Alas    MRN: [id number]           Patient Information     Date Of Birth          1950        Visit Information        Provider Department      1/8/2018 8:15 AM Henry Guadarrama MD; [facility] CYSTO PROC ROOM [facility]        Today's Diagnoses     Elevated prostate specific antigen (PSA)    -  1      Care Instructions      Prostate Needle Biopsy    Prostate needle biopsy is a test to look for prostate cancer. During the test, a thin, hollow needle is used to take small samples of tissue from the prostate. The samples are then tested in a lab.  Getting ready for the procedure  Prepare as you have been told. In addition to the following:    Tell your healthcare provider about all medicines you take. This includes herbs and other supplements. It also includes any blood thinners, such as warfarin, clopidogrel, or daily aspirin. You may need to stop taking some or all of them before the procedure.    You may be told to use a laxative, enemas, or both before the biopsy. This is to empty the colon and rectum of stool. Follow the instructions you are given.    Your healthcare provider may prescribe antibiotics before the procedure. If so, take these as directed.  Risks and possible complications   All procedures have risks. The risks of this procedure include:    Discomfort in the prostate area    Infection in the urinary tract or prostate    Infection in the bloodstream    Rectal or urinary bleeding   The day of the procedure  The procedure is done in a healthcare provider s office or a hospital. It takes about 45 minutes. You will be able to go home the same day. Transrectal ultrasound is often used during the procedure. This test uses sound waves to make images on a computer screen. The images help the healthcare provider insert the needle in the correct place. During the biopsy:    If ultrasound will be used, you may be asked to drink water to  fill your bladder.      You may lie on your side on an exam table.     The ultrasound transducer, which is about the size of a finger, is lubricated. It is then inserted into the rectum. This will feel like a prostate exam. The transducer is moved until the prostate can be seen in the ultrasound images.      To numb the biopsy area, local anesthetics may be injected. You might also be given medicine to make you sleepy.    Using the ultrasound images as a guide, the biopsy needle is inserted. It may be inserted through the rectum or through the skin between the scrotum and the anus (perineum).    The needle is used to take tissue samples from the prostate. About 12 samples are taken from different areas of the prostate. These samples are sent to a lab to be tested for cancer.  After the biopsy  At first you may feel a little lightheaded, especially if you had medicine to make you sleepy. You can lie on the table until you feel able to stand. You can go home once you are feeling better. You can go back to your normal activities. You may have some blood in your urine or stool that day. This is normal. You may also notice blood in your semen for weeks after the biopsy. This is normal and not dangerous. Your healthcare provider can tell you more about what to expect.  Follow-up care  You will see your healthcare provider for a follow-up visit. Depending on the biopsy results, you may be scheduled for more tests. If signs of cancer are found, you and your healthcare provider can discuss options for further testing.  When to call your healthcare provider  Call your healthcare provider if you have any of the following:    Blood clots in your urine    Bloody diarrhea    Blood in the urine or stool that doesn t go away after 48 hours    Chest pain or trouble breathing (call 911)    Chills    Feeling of weakness    Fever of 100.4 F (38 C) or higher, or as directed by your healthcare provider    Inability to urinate   Date Last  Reviewed: 5/1/2017 2000-2017 [employer]. [address] [place] [address], [place] [address]. All rights reserved. This information is not intended as a substitute for professional medical care. Always follow your healthcare professional's instructions.                Follow-ups after your visit        Your next 10 appointments already scheduled     Jan 12, 2018  9:30 AM CST   [facility]   [facility]    [address] [place] [id number] [phone]           Please do not eat 10-12 hours before your appointment if you are coming in fasting for labs on lipids, cholesterol, or glucose (sugar). This does not apply to pregnant women. Water, hot tea and black coffee (with nothing added) are okay. Do not drink other fluids, diet soda or chew gum.              Future tests that were ordered for you today     Open Future Orders        Priority Expected Expires Ordered    PSA tumor marker Routine 1/12/2018 1/8/2019 1/8/2018            Who to contact     If you have questions or need follow up information about today's clinic visit or your schedule please contact [facility] directly at [phone].  Normal or non-critical lab and imaging results will be communicated to you by MyChart, letter or phone within 4 business days after the clinic has received the results. If you do not hear from us within 7 days, please contact the clinic through [employer] or phone. If you have a critical or abnormal lab result, we will notify you by phone as soon as possible.  Submit refill requests through [employer] or call your pharmacy and they will forward the refill request to us. Please allow 3 business days for your refill to be completed.          Additional Information About Your Visit        [employer] Information     [employer] lets you send messages to your doctor, view your test results, renew your prescriptions, schedule appointments and more. To  "sign up, go to [email or web address] . Click on \"Log in\" on the left side of the screen, which will take you to the Welcome page. Then click on \"Sign up Now\" on the right side of the page.     You will be asked to enter the access code listed below, as well as some personal information. Please follow the directions to create your username and password.     Your access code is: [email or web address]  Expires: 2018  9:59 AM     Your access code will  in 90 days. If you need help or a new code, please call your [place] clinic or [phone].        Care EveryWhere ID     This is your Care EveryWhere ID. This could be used by other organizations to access your [place] medical records  [id number]        Your Vitals Were     Pulse Pulse Oximetry                62 98%           Blood Pressure from Last 3 Encounters:   18 141/87   17 130/78   17 138/88    Weight from Last 3 Encounters:   17 78.5 kg (173 lb)   07/07/15 75.8 kg (167 lb)   07/03/15 75.8 kg (167 lb)              We Performed the Following     Perirectal Culture Aerobic Bacterial        Primary Care Provider Office Phone # Fax #    Marybeth Gaffney [phone] [address] [facility] [address]        Equal Access to Services     [facility] AH: Hadii aad ku hadasho Soomaali, waaxda luqadaha, qaybta kaalmada adeegyada, clara pollard . So [facility] [phone].    ATENCIÓN: Si habla español, tiene a espino disposición servicios gratuitos de asistencia lingüística. Llame al [phone].    We comply with applicable federal civil rights laws and [place] laws. We do not discriminate on the basis of race, color, national origin, age, disability, sex, sexual orientation, or gender identity.            Thank you!     Thank you for choosing [facility]  for your care. Our goal is always to provide you with excellent care. Hearing back from our patients is one way we can " continue to improve our services. Please take a few minutes to complete the written survey that you may receive in the mail after your visit with us. Thank you!             Your Updated Medication List - Protect others around you: Learn how to safely use, store and throw away your medicines at www.disposemymeds.org.          This list is accurate as of: 1/8/18  9:14 AM.  Always use your most recent med list.                   Brand Name Dispense Instructions for use Diagnosis    ASPIRIN NOT PRESCRIBED    INTENTIONAL    0 each    Please choose reason not prescribed, below    Old myocardial infarction       rivaroxaban ANTICOAGULANT 20 MG Tabs tablet    XARELTO     Take 15 mg by mouth daily (with dinner)    PAF (paroxysmal atrial fibrillation) (H)       VITAMIN B COMPLEX PO      Take 2 tablets by mouth. Every 2 weeks        VITAMIN C PO      Take 3 tablets by mouth. Every 2 weeks        vitamin E 200 UNIT capsule      Take 2 capsules by mouth. Every 2 weeks        Zinc Gluconate 100 MG Tabs      Take 2 tablets by mouth. Every 2 weeks

## 2018-01-08 NOTE — PATIENT INSTRUCTIONS
Prostate Needle Biopsy    Prostate needle biopsy is a test to look for prostate cancer. During the test, a thin, hollow needle is used to take small samples of tissue from the prostate. The samples are then tested in a lab.  Getting ready for the procedure  Prepare as you have been told. In addition to the following:    Tell your healthcare provider about all medicines you take. This includes herbs and other supplements. It also includes any blood thinners, such as warfarin, clopidogrel, or daily aspirin. You may need to stop taking some or all of them before the procedure.    You may be told to use a laxative, enemas, or both before the biopsy. This is to empty the colon and rectum of stool. Follow the instructions you are given.    Your healthcare provider may prescribe antibiotics before the procedure. If so, take these as directed.  Risks and possible complications   All procedures have risks. The risks of this procedure include:    Discomfort in the prostate area    Infection in the urinary tract or prostate    Infection in the bloodstream    Rectal or urinary bleeding   The day of the procedure  The procedure is done in a healthcare provider s office or a hospital. It takes about 45 minutes. You will be able to go home the same day. Transrectal ultrasound is often used during the procedure. This test uses sound waves to make images on a computer screen. The images help the healthcare provider insert the needle in the correct place. During the biopsy:    If ultrasound will be used, you may be asked to drink water to fill your bladder.      You may lie on your side on an exam table.     The ultrasound transducer, which is about the size of a finger, is lubricated. It is then inserted into the rectum. This will feel like a prostate exam. The transducer is moved until the prostate can be seen in the ultrasound images.      To numb the biopsy area, local anesthetics may be injected. You might also be given  medicine to make you sleepy.    Using the ultrasound images as a guide, the biopsy needle is inserted. It may be inserted through the rectum or through the skin between the scrotum and the anus (perineum).    The needle is used to take tissue samples from the prostate. About 12 samples are taken from different areas of the prostate. These samples are sent to a lab to be tested for cancer.  After the biopsy  At first you may feel a little lightheaded, especially if you had medicine to make you sleepy. You can lie on the table until you feel able to stand. You can go home once you are feeling better. You can go back to your normal activities. You may have some blood in your urine or stool that day. This is normal. You may also notice blood in your semen for weeks after the biopsy. This is normal and not dangerous. Your healthcare provider can tell you more about what to expect.  Follow-up care  You will see your healthcare provider for a follow-up visit. Depending on the biopsy results, you may be scheduled for more tests. If signs of cancer are found, you and your healthcare provider can discuss options for further testing.  When to call your healthcare provider  Call your healthcare provider if you have any of the following:    Blood clots in your urine    Bloody diarrhea    Blood in the urine or stool that doesn t go away after 48 hours    Chest pain or trouble breathing (call 911)    Chills    Feeling of weakness    Fever of 100.4 F (38 C) or higher, or as directed by your healthcare provider    Inability to urinate   Date Last Reviewed: 5/1/2017 2000-2017 [employer]. [address] [place] [address], [place] [address]. All rights reserved. This information is not intended as a substitute for professional medical care. Always follow your healthcare professional's instructions.

## 2018-01-10 DIAGNOSIS — R97.20 ELEVATED PROSTATE SPECIFIC ANTIGEN (PSA): Primary | ICD-10-CM

## 2018-01-10 RX ORDER — CIPROFLOXACIN 500 MG/1
500 TABLET, FILM COATED ORAL 2 TIMES DAILY
Qty: 6 TABLET | Refills: 0 | Status: SHIPPED | OUTPATIENT
Start: 2018-01-10 | End: 2018-01-13

## 2018-01-10 RX ORDER — CEPHALEXIN 500 MG/1
500 CAPSULE ORAL 3 TIMES DAILY
Qty: 9 CAPSULE | Refills: 0 | Status: SHIPPED | OUTPATIENT
Start: 2018-01-10 | End: 2018-01-13

## 2018-01-11 LAB
BACTERIA SPEC CULT: ABNORMAL
Lab: ABNORMAL
SPECIMEN SOURCE: ABNORMAL

## 2018-01-12 DIAGNOSIS — R97.20 ELEVATED PROSTATE SPECIFIC ANTIGEN (PSA): ICD-10-CM

## 2018-01-12 PROCEDURE — 36415 COLL VENOUS BLD VENIPUNCTURE: CPT | Performed by: UROLOGY

## 2018-01-12 PROCEDURE — 84153 ASSAY OF PSA TOTAL: CPT | Performed by: UROLOGY

## 2018-01-13 LAB — PSA SERPL-MCNC: 11.9 UG/L (ref 0–4)

## 2018-02-02 ENCOUNTER — RADIANT APPOINTMENT (OUTPATIENT)
Dept: ULTRASOUND IMAGING | Facility: CLINIC | Age: 68
End: 2018-02-02
Payer: COMMERCIAL

## 2018-02-02 ENCOUNTER — OFFICE VISIT (OUTPATIENT)
Dept: UROLOGY | Facility: CLINIC | Age: 68
End: 2018-02-02
Payer: COMMERCIAL

## 2018-02-02 DIAGNOSIS — R97.20 ELEVATED PROSTATE SPECIFIC ANTIGEN (PSA): ICD-10-CM

## 2018-02-02 DIAGNOSIS — R97.20 ELEVATED PROSTATE SPECIFIC ANTIGEN (PSA): Primary | ICD-10-CM

## 2018-02-02 PROCEDURE — 88344 IMHCHEM/IMCYTCHM EA MLT ANTB: CPT | Performed by: UROLOGY

## 2018-02-02 PROCEDURE — 88305 TISSUE EXAM BY PATHOLOGIST: CPT | Performed by: UROLOGY

## 2018-02-02 PROCEDURE — 76872 US TRANSRECTAL: CPT | Performed by: UROLOGY

## 2018-02-02 PROCEDURE — 55700 HC BIOPSY PROSTATE NEEDLE/PUNCH: CPT | Performed by: UROLOGY

## 2018-02-02 PROCEDURE — 99000 SPECIMEN HANDLING OFFICE-LAB: CPT | Performed by: UROLOGY

## 2018-02-02 PROCEDURE — 76942 ECHO GUIDE FOR BIOPSY: CPT | Performed by: UROLOGY

## 2018-02-02 NOTE — PROGRESS NOTES
Patient is here for prostate biopsy.  He was placed in the dorsal lithotomy position.  Prostate ultrasound placed transrectally.  The neurovascular bundle was anesthetized using 1% lidocaine.  Prostate measurements obtained.  Prostate size is 55 cc.  12 biopsies obtained under guidance of prostate ultrasound using biopsy needle.  Patient tolerated procedure.  Return to clinic in one week for biopsy result.

## 2018-02-02 NOTE — MR AVS SNAPSHOT
"              After Visit Summary   2/2/2018    Andre Alas    MRN: [id number]           Patient Information     Date Of Birth          1950        Visit Information        Provider Department      2/2/2018 9:30 AM Henry Guadarrama MD [facility]        Today's Diagnoses     Elevated prostate specific antigen (PSA)    -  1       Follow-ups after your visit        Your next 10 appointments already scheduled     Feb 09, 2018 11:30 AM CST   Return Visit with Henry Guadarrama MD   [facility] ([address]  [place] [address] [phone]              Who to contact     If you have questions or need follow up information about today's clinic visit or your schedule please contact [facility] directly at [phone].  Normal or non-critical lab and imaging results will be communicated to you by MyChart, letter or phone within 4 business days after the clinic has received the results. If you do not hear from us within 7 days, please contact the clinic through MyChart or phone. If you have a critical or abnormal lab result, we will notify you by phone as soon as possible.  Submit refill requests through [employer] or call your pharmacy and they will forward the refill request to us. Please allow 3 business days for your refill to be completed.          Additional Information About Your Visit        MyChart Information     [employer] lets you send messages to your doctor, view your test results, renew your prescriptions, schedule appointments and more. To sign up, go to [email or web address] . Click on \"Log in\" on the left side of the screen, which will take you to the Welcome page. Then click on \"Sign up Now\" on the right side of the page.     You will be asked to enter the access code listed below, as well as some personal information. Please follow the directions to create your username and password.     Your access code is: " [email or web address]  Expires: 2018  9:59 AM     Your access code will  in 90 days. If you need help or a new code, please call your [place] clinic or [phone].        Care EveryWhere ID     This is your Care EveryWhere ID. This could be used by other organizations to access your [place] medical records  [id number]         Blood Pressure from Last 3 Encounters:   18 141/87   17 130/78   17 138/88    Weight from Last 3 Encounters:   17 173 lb (78.5 kg)   07/07/15 167 lb (75.8 kg)   07/03/15 167 lb (75.8 kg)              We Performed the Following     BIOPSY PROSTATE NEEDLE/PUNCH     CL SPECIMEN HANDLING, OFF->LAB     Surgical pathology exam        Primary Care Provider Office Phone # Fax #    Marybeth Gaffney [phone] [address] [facility] [address]        Equal Access to Services     ROBERT HINOJOSA : Hadii aad ku hadasho Soomaali, waaxda luqadaha, qaybta kaalmada adeegyada, waxay idiin hayrubénn roque pollard . So [facility] [phone].    ATENCIÓN: Si habla español, tiene a espino disposición servicios gratuitos de asistencia lingüística. Llame al [phone].    We comply with applicable federal civil rights laws and [place] laws. We do not discriminate on the basis of race, color, national origin, age, disability, sex, sexual orientation, or gender identity.            Thank you!     Thank you for choosing [facility]  for your care. Our goal is always to provide you with excellent care. Hearing back from our patients is one way we can continue to improve our services. Please take a few minutes to complete the written survey that you may receive in the mail after your visit with us. Thank you!             Your Updated Medication List - Protect others around you: Learn how to safely use, store and throw away your medicines at www.disposemymeds.org.          This list is accurate as of 18 10:01 AM.  Always use your most recent med  list.                   Brand Name Dispense Instructions for use Diagnosis    ASPIRIN NOT PRESCRIBED    INTENTIONAL    0 each    Please choose reason not prescribed, below    Old myocardial infarction       rivaroxaban ANTICOAGULANT 20 MG Tabs tablet    XARELTO     Take 15 mg by mouth daily (with dinner)    PAF (paroxysmal atrial fibrillation) (H)       VITAMIN B COMPLEX PO      Take 2 tablets by mouth. Every 2 weeks        VITAMIN C PO      Take 3 tablets by mouth. Every 2 weeks        vitamin E 200 UNIT capsule      Take 2 capsules by mouth. Every 2 weeks        Zinc Gluconate 100 MG Tabs      Take 2 tablets by mouth. Every 2 weeks

## 2018-02-06 LAB — COPATH REPORT: NORMAL

## 2018-02-09 ENCOUNTER — OFFICE VISIT (OUTPATIENT)
Dept: UROLOGY | Facility: CLINIC | Age: 68
End: 2018-02-09
Payer: COMMERCIAL

## 2018-02-09 DIAGNOSIS — C61 MALIGNANT NEOPLASM OF PROSTATE (H): Primary | ICD-10-CM

## 2018-02-09 DIAGNOSIS — N40.1 BENIGN PROSTATIC HYPERPLASIA WITH URINARY RETENTION: ICD-10-CM

## 2018-02-09 DIAGNOSIS — R33.8 BENIGN PROSTATIC HYPERPLASIA WITH URINARY RETENTION: ICD-10-CM

## 2018-02-09 PROBLEM — R19.5 POSITIVE FIT (FECAL IMMUNOCHEMICAL TEST): Status: ACTIVE | Noted: 2017-11-20

## 2018-02-09 PROCEDURE — 51798 US URINE CAPACITY MEASURE: CPT | Performed by: UROLOGY

## 2018-02-09 PROCEDURE — 99214 OFFICE O/P EST MOD 30 MIN: CPT | Mod: 25 | Performed by: UROLOGY

## 2018-02-09 NOTE — MR AVS SNAPSHOT
"              After Visit Summary   2/9/2018    Andre Alas    MRN: [id number]           Patient Information     Date Of Birth          1950        Visit Information        Provider Department      2/9/2018 11:30 AM Henry Guadarrama MD [facility]        Today's Diagnoses     Malignant neoplasm of prostate (H)    -  1    Benign prostatic hyperplasia with urinary retention           Follow-ups after your visit        Future tests that were ordered for you today     Open Future Orders        Priority Expected Expires Ordered    PSA tumor marker Routine 8/11/2018 2/10/2019 2/9/2018            Who to contact     If you have questions or need follow up information about today's clinic visit or your schedule please contact [facility] directly at [phone].  Normal or non-critical lab and imaging results will be communicated to you by MyChart, letter or phone within 4 business days after the clinic has received the results. If you do not hear from us within 7 days, please contact the clinic through [employer] or phone. If you have a critical or abnormal lab result, we will notify you by phone as soon as possible.  Submit refill requests through "[employer] or call your pharmacy and they will forward the refill request to us. Please allow 3 business days for your refill to be completed.          Additional Information About Your Visit        [employer] Information     "[employer] lets you send messages to your doctor, view your test results, renew your prescriptions, schedule appointments and more. To sign up, go to [email or web address] . Click on \"Log in\" on the left side of the screen, which will take you to the Welcome page. Then click on \"Sign up Now\" on the right side of the page.     You will be asked to enter the access code listed below, as well as some personal information. Please follow the directions to create your username and password.     Your access code is: " [email or web address]  Expires: 2018  9:59 AM     Your access code will  in 90 days. If you need help or a new code, please call your [facility] clinic or [phone].        Care EveryWhere ID     This is your Care EveryWhere ID. This could be used by other organizations to access your [facility] medical records  [id number]         Blood Pressure from Last 3 Encounters:   18 141/87   17 130/78   17 138/88    Weight from Last 3 Encounters:   17 173 lb (78.5 kg)   07/07/15 167 lb (75.8 kg)   07/03/15 167 lb (75.8 kg)              We Performed the Following     MEASURE POST-VOID RESIDUAL URINE/BLADDER CAPACITY, US NON-IMAGING        Primary Care Provider Office Phone # Fax #    Marybeth Gaffney [phone]       [address] [facility] [address]        Equal Access to Services     : Hadii aad ku hadasho Soomaali, waaxda luqadaha, qaybta kaalmada adeegyada, waxay idiin hayaan adeeg kharacary la'davida . [facility] [phone].    ATENCIÓN: Si habla español, tiene a espino disposición servicios gratuitos de asistencia lingüística. Llame al [phone].    We comply with applicable federal civil rights laws and [place] laws. We do not discriminate on the basis of race, color, national origin, age, disability, sex, sexual orientation, or gender identity.            Thank you!     Thank you for choosing [facility]  for your care. Our goal is always to provide you with excellent care. Hearing back from our patients is one way we can continue to improve our services. Please take a few minutes to complete the written survey that you may receive in the mail after your visit with us. Thank you!             Your Updated Medication List - Protect others around you: Learn how to safely use, store and throw away your medicines at www.disposemymeds.org.          This list is accurate as of 18 12:12 PM.  Always use your most recent med list.                    Brand Name Dispense Instructions for use Diagnosis    ASPIRIN NOT PRESCRIBED    INTENTIONAL    0 each    Please choose reason not prescribed, below    Old myocardial infarction       rivaroxaban ANTICOAGULANT 20 MG Tabs tablet    XARELTO     Take 15 mg by mouth daily (with dinner)    PAF (paroxysmal atrial fibrillation) (H)       VITAMIN B COMPLEX PO      Take 2 tablets by mouth. Every 2 weeks        VITAMIN C PO      Take 3 tablets by mouth. Every 2 weeks        vitamin E 200 UNIT capsule      Take 2 capsules by mouth. Every 2 weeks        Zinc Gluconate 100 MG Tabs      Take 2 tablets by mouth. Every 2 weeks

## 2018-02-09 NOTE — PROGRESS NOTES
Chief Complaint   Patient presents with     RECHECK       Andre Alas is a 67 year old male who presents today for follow up of   Chief Complaint   Patient presents with     RECHECK    f/u post biopsy for elevated psa.  He is without any complaints.  He has LUTS with retention of urine.    Current Outpatient Prescriptions   Medication Sig Dispense Refill     ASPIRIN NOT PRESCRIBED (INTENTIONAL) Please choose reason not prescribed, below 0 each 0     rivaroxaban ANTICOAGULANT (XARELTO) 20 MG TABS tablet Take 15 mg by mouth daily (with dinner)        Ascorbic Acid (VITAMIN C PO) Take 3 tablets by mouth. Every 2 weeks       B Complex Vitamins (VITAMIN B COMPLEX PO) Take 2 tablets by mouth. Every 2 weeks       Zinc Gluconate (ZINC) 100 MG TABS Take 2 tablets by mouth. Every 2 weeks       vitamin E 200 UNIT capsule Take 2 capsules by mouth. 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     Hyperlipidemia LDL goal <70 11/28/2011     NSTEMI (non-ST elevated myocardial infarction) (H) 7/2014    prob sec to PAF -- no sig CAD     PAF (paroxysmal atrial fibrillation) (H) 7/2014     Past Surgical History:   Procedure Laterality Date     ANGIOGRAM  2014    normal - non ST MI     DENTAL SURGERY  2000     HERNIA REPAIR, INGUINAL RT/LT  1952     Family History   Problem Relation Age of Onset     Heart Failure Mother 73     CEREBROVASCULAR DISEASE Father 70     HEART DISEASE Father 91     Pacemaker     KIDNEY DISEASE Father      stones      Cancer - colorectal No family hx of      Prostate Cancer No family hx of      DIABETES No family hx of      Hypertension No family hx of      Social History     Social History     Marital status:      Spouse name: Ludmila     Number of children: 2      Years of education: N/A     Occupational History      [employer]     Social History Main Topics     Smoking status: Never Smoker     Smokeless tobacco: Never Used     Alcohol use No     Drug use: No     Sexual activity: Yes     Partners: Female     Birth control/ protection: Condom     Other Topics Concern     Parent/Sibling W/ Cabg, Mi Or Angioplasty Before 65f 55m? No     Social History Narrative       REVIEW OF SYSTEMS  =================  C: NEGATIVE for fever, chills, change in weight  I: NEGATIVE for worrisome rashes, moles or lesions  E/M: NEGATIVE for ear, mouth and throat problems  R: NEGATIVE for significant cough or SHORTNESS OF BREATH,   CV: NEGATIVE for chest pain, palpitations or peripheral edema  GI: NEGATIVE for nausea, abdominal pain, heartburn, or change in bowel habits  NEURO: NEGATIVE any motor/sensory changes  PSYCH: NEGATIVE for recent mood disorder    Physical Exam:  There were no vitals taken for this visit.   Patient is pleasant, in no acute distress, good general condition.  Lung: no evidence of respiratory distress    Abdomen: Soft, nondistended, non tender. No masses. No rebound or guarding.   Exam: bladder scan >500 cc  Skin: Warm and dry.  No redness.  Psych: normal mood and affect  Neuro: alert and oriented  Patient Name: GABINO CLANCY   MR#: [id number]   Specimen #:    Collected: 2/2/2018   Received: 2/2/2018   Reported: 2/6/2018 17:44   Ordering Phy(s): DANNA JURADO     For improved result formatting, select 'View Enhanced Report Format' under    Linked Documents section.     SPECIMEN(S):   A: Prostate biopsy, right   B: Prostate biopsy, left     FINAL DIAGNOSIS:   A. Prostate biopsy, right:   - Prostatic adenocarcinoma:     - WHO Grade Group 1  (see comment)        - [place] score 6, patterns 3+3     - Proportion of prostatic tissue involved by tumor:  5-7%     - Number of tissue cores involved by tumor: 4 of multiple.     - Perineural  invasion:  None seen.     - Angiolymphatic invasion:  None seen.     B. Prostate biopsy, left:   - Prostatic adenocarcinoma:     - WHO Grade Group 1  (see comment)        - Aleksandar score 6, patterns 3+3     - Proportion of prostatic tissue involved by tumor:  5%     - Number of tissue cores involved by tumor: 1 of multiple.     - Perineural invasion:  None seen.     - Angiolymphatic invasion:  None seen.     COMMENT:   The template for prostate cancer on needle biopsy was adapted from the   following references.     In 2016 the World Health Organization adopted a new system for grading   prostate cancer (reference 2) with the   benefits of (a) more accurate grade stratification than the current   [place] system; (b) simplified grading   system of 5 opposed to multiple possible scores depending on various   [place]ato pattern combinations; (c) lowest   grade 1 as opposed to current practice of Aleksandar score 6, with the   potential to reduce overtreatment of   indolent prostate cancer.     The new grading scheme correlates with the prior [place]ato grading system as    follows:     Grade Group 1 = Aleksandar score 6 or less   Grade Group 2 = [place] score 3+4=7   Grade Group 3 - Aleksandar score 4+3 = 7   Grade Group 4 = Aleksandar score 8   Grade Group 5 = Aleksandar score 9-10     REFERENCES   (1) College of American Pathologists Cancer Protocols and Checklists, June 2012.     (2) Pj JI, Ezra MJ, Asmita DD, et al. A contemporary prostate   cancer grading system: A validating   alternative to the Cokato score. Eur Urol (2015)     Electronically signed out by:     ROLA Allen M.D.     CLINICAL HISTORY:   57-year-old male with elevated prostatic specific antigen (PSA).     GROSS:   Two specimen containers with formalin are received labeled with the   patient's name, date of birth, and medical   record number.  Information on the requisition slip, containers, and   associated labels is confirmed.     A. The specimen is  "designated \"right prostate\" consisting of seven needle   biopsies which range from 0.6-1.2 cm   in length.  Additionally in the container are multiple fragments of tissue    measuring up to 0.4 cm in   aggregate.  The entire specimen is placed on a tissue wrap.  All submitted    in one cassette.     B.  The specimen is designated \"left prostate\" consisting of multiple   fragments of needle biopsies which range   from 0.2-0.4 cm in length.  Inked black.  The entire specimen is placed on    a tissue wrap.  All submitted in   one cassette. (Dictated by: Sharita Espinal 2/2/2018 04:03 PM)     MICROSCOPIC:   Microscopic examination was performed.  Diagnosis was assisted with   evaluation of immunohistochemical stains   to P504S/p63.     The immunohistochemical stain control was reviewed showing an appropriate   reaction pattern for interpretation.   (Dictated by: ADRIENNE Allen MD 02/06/2018)     The following ASR disclaimer is in reference to the stain(s) listed below:       Analyte Specific Reagents (ASRs)   are used in many laboratory tests necessary for standard medical care and   generally do not require FDA   approval.  This test was developed and its performance characteristics   determined by [facility].  It has not been cleared   or approved by the U.S. Food and Drug   Administration.   P504s/p63 (ASR)   P504s/p63 (ASR)     CPT Codes:   A: 05920.051, 48869-AZV-OYI   B: 53021.051, 02049-DON-GXA     TESTING LAB LOCATION:   [address] [address] [phone]     COLLECTION SITE:   Client: [facility]   Location: [facility] (B)   Assessment/Plan:   (C61) Malignant neoplasm of prostate (H)  (primary encounter diagnosis)  Comment: natural h/o prostate cancer discussed.  He has stage T1c group 1 prostate cancer.   Plan: discussed " observation/surgery/XRT/cryotherapy/hormonal therapy.  Side effects discussed.  Patient elects observation.  Recheck psa in six months             (N40.1,  R33.8) Benign prostatic hyperplasia with urinary retention  Comment:    Plan: discussed laser TURP           Risks and benefits discussed          Video reviewed.     25 min spent face to face consultation about tx options.

## 2018-02-22 ENCOUNTER — OFFICE VISIT (OUTPATIENT)
Dept: FAMILY MEDICINE | Facility: CLINIC | Age: 68
End: 2018-02-22
Payer: COMMERCIAL

## 2018-02-22 VITALS
HEART RATE: 72 BPM | TEMPERATURE: 97.4 F | BODY MASS INDEX: 24.88 KG/M2 | SYSTOLIC BLOOD PRESSURE: 141 MMHG | WEIGHT: 168 LBS | OXYGEN SATURATION: 95 % | HEIGHT: 69 IN | DIASTOLIC BLOOD PRESSURE: 90 MMHG

## 2018-02-22 DIAGNOSIS — Z23 NEED FOR PROPHYLACTIC VACCINATION AGAINST STREPTOCOCCUS PNEUMONIAE (PNEUMOCOCCUS): ICD-10-CM

## 2018-02-22 DIAGNOSIS — I48.0 PAF (PAROXYSMAL ATRIAL FIBRILLATION) (H): ICD-10-CM

## 2018-02-22 DIAGNOSIS — R03.0 ELEVATED BLOOD PRESSURE READING WITHOUT DIAGNOSIS OF HYPERTENSION: ICD-10-CM

## 2018-02-22 DIAGNOSIS — Z86.0100 HISTORY OF COLONIC POLYPS: ICD-10-CM

## 2018-02-22 DIAGNOSIS — C61 PROSTATE CANCER (H): Primary | ICD-10-CM

## 2018-02-22 PROBLEM — R19.5 POSITIVE FIT (FECAL IMMUNOCHEMICAL TEST): Status: RESOLVED | Noted: 2017-11-20 | Resolved: 2018-02-22

## 2018-02-22 PROCEDURE — 90670 PCV13 VACCINE IM: CPT | Performed by: FAMILY MEDICINE

## 2018-02-22 PROCEDURE — G0009 ADMIN PNEUMOCOCCAL VACCINE: HCPCS | Performed by: FAMILY MEDICINE

## 2018-02-22 PROCEDURE — 99214 OFFICE O/P EST MOD 30 MIN: CPT | Mod: 25 | Performed by: FAMILY MEDICINE

## 2018-02-22 NOTE — NURSING NOTE
Patient is covered under this program for the following reason:  Not MNVFC Eligible: Insured - Has insurance that covers the cost of all vaccines (Not MnVFC elligible because insurance already covers all vaccines)    Patient/parent was given the MnV[facility] Eligibility Information sheet today, , with their vaccinations.      Screening Questionnaire for Adult Immunization   Are you sick today?   No    Do you have allergies to medications, food, a vaccine component or latex?   No    Have you ever had a serious reaction after receiving a vaccination?   No    Do you have a long-term health problem with heart disease, lung disease,  asthma, kidney disease, metabolic (e.g., diabetes), anemia, or other blood disorder?   Yes    Do you have cancer, leukemia,HIV/ AIDS, or any immune system problem?   Yes       In the past 3 months, have you taken medications that weaken your immune system, such as cortisone, prednisone, other steroids, or anticancer drugs, or have you had radiation treatments?   No    Have you had a seizure or a brain, or other nervous system problem?   No    During the past year, have you received a transfusion of blood or blood       products, or been given a  immune (gamma) globulin or an antiviral drug?   No    For women: Are you pregnant or is there a chance you could become         pregnant during the next month?   No    Have you received any vaccinations in the past 4 weeks?   No     Immunization questionnaire answers were all negative.     VIS for Prevnar 13 given on same date of administration.  Due to injection administration, patient instructed to remain in clinic for 15 minutes  afterwards, and to report any adverse reaction to me immediately.    Staff signature/Title: Yeni Sy CMA

## 2018-02-22 NOTE — PROGRESS NOTES
SUBJECTIVE:   Andre Alas is a 67 year old male who presents to clinic today for the following health issues:      Patient is here for a referral for a second opinion for his prostate cancer and treatment options. He would also like to discuss the XARELTO, when should he start taking it again. Hasn't stopped bleeding since his procedure on 2/9/17.    He was recently diagnosed with prostate cancer.  He has had urinary obstructive symptoms for quite some time and he is now scheduled for a laser TURP procedure later next month.  He has a number of questions about this procedure and how best to address his prostate cancer.  He is interested in having a second opinion for that.  He is not having any hematuria, but he has had some blood in his ejaculate.  He is on Xarelto for a history of paroxysmal atrial fibrillation.  He has had a couple of small strokes in the past.  He is still holding his Xarelto at this point.  He did have a colonoscopy a couple of months ago with removal of some polyps. This was for further evaluation of a positive FIT test that he had almost a year ago.    Problem list and histories reviewed & adjusted, as indicated.  Additional history: as documented    Patient Active Problem List   Diagnosis     Hyperlipidemia LDL goal <70     Allergic rhinitis due to dust     PAF (paroxysmal atrial fibrillation) (H)     Old myocardial infarction     History of CVA (cerebrovascular accident)     Advanced directives, counseling/discussion     Prostate cancer (H)     Past Surgical History:   Procedure Laterality Date     ANGIOGRAM  2014    normal - non ST MI     DENTAL SURGERY  2000     HERNIA REPAIR, INGUINAL RT/LT  1952       Social History   Substance Use Topics     Smoking status: Never Smoker     Smokeless tobacco: Never Used     Alcohol use No     Family History   Problem Relation Age of Onset     Heart Failure Mother 73     CEREBROVASCULAR DISEASE Father 70     HEART DISEASE Father 91     Pacemaker  "    KIDNEY DISEASE Father      stones      Cancer - colorectal No family hx of      Prostate Cancer No family hx of      DIABETES No family hx of      Hypertension No family hx of          Current Outpatient Prescriptions   Medication Sig Dispense Refill     ASPIRIN NOT PRESCRIBED (INTENTIONAL) Please choose reason not prescribed, below 0 each 0     Ascorbic Acid (VITAMIN C PO) Take 3 tablets by mouth. Every 2 weeks       B Complex Vitamins (VITAMIN B COMPLEX PO) Take 2 tablets by mouth. Every 2 weeks       Zinc Gluconate (ZINC) 100 MG TABS Take 2 tablets by mouth. Every 2 weeks       vitamin E 200 UNIT capsule Take 2 capsules by mouth. Every 2 weeks       rivaroxaban ANTICOAGULANT (XARELTO) 20 MG TABS tablet Take 15 mg by mouth daily (with dinner)        Allergies   Allergen Reactions     Dust Mites      Mold        Reviewed and updated as needed this visit by clinical staff  Tobacco  Allergies  Meds  Med Hx  Surg Hx  Fam Hx  Soc Hx      Reviewed and updated as needed this visit by Provider         ROS:  Mainly significant for the above    OBJECTIVE:     /90 (BP Location: Left arm, Patient Position: Chair, Cuff Size: Adult Small)  Pulse 72  Temp 97.4  F (36.3  C) (Oral)  Ht 5' 9\" (1.753 m)  Wt 168 lb (76.2 kg)  SpO2 95%  BMI 24.81 kg/m2  Body mass index is 24.81 kg/(m^2).  GENERAL: healthy, alert and no distress    Diagnostic Test Results:  Office Visit on 02/02/2018   Component Date Value Ref Range Status     Copath Report 02/02/2018    Final                    Value:Patient Name: GABINO CLANCY  MR#: [id number]  Specimen #:   Collected: 2/2/2018  Received: 2/2/2018  Reported: 2/6/2018 17:44  Ordering Phy(s): DANNA JURADO    For improved result formatting, select 'View Enhanced Report Format' under   Linked Documents section.    SPECIMEN(S):  A: Prostate biopsy, right  B: Prostate biopsy, left    FINAL DIAGNOSIS:  A. Prostate biopsy, right:  - Prostatic adenocarcinoma:    - WHO " Grade Group 1  (see comment)       - [place] score 6, patterns 3+3    - Proportion of prostatic tissue involved by tumor:  5-7%    - Number of tissue cores involved by tumor: 4 of multiple.    - Perineural invasion:  None seen.    - Angiolymphatic invasion:  None seen.    B. Prostate biopsy, left:  - Prostatic adenocarcinoma:    - WHO Grade Group 1  (see comment)       - [place] score 6, patterns 3+3    - Proportion of prostatic tissue involved by tumor:  5%    - Number of tissue cores involved by tumor: 1 of multiple.    - Perineural invasion:  None seen.    -                           Angiolymphatic invasion:  None seen.    COMMENT:  The template for prostate cancer on needle biopsy was adapted from the   following references.    In 2016 the World Health Organization adopted a new system for grading   prostate cancer (reference 2) with the  benefits of (a) more accurate grade stratification than the current   [place] system; (b) simplified grading  system of 5 opposed to multiple possible scores depending on various   [place] pattern combinations; (c) lowest  grade 1 as opposed to current practice of Aleksandar score 6, with the   potential to reduce overtreatment of  indolent prostate cancer.    The new grading scheme correlates with the prior [place] grading system as   follows:    Grade Group 1 = Aleksandar score 6 or less  Grade Group 2 = [place] score 3+4=7  Grade Group 3 - Aleksandar score 4+3 = 7  Grade Group 4 = Aleksandar score 8  Grade Group 5 = Aleksandar score 9-10    REFERENCES  (1) College of American Pathologists Cancer Protocols and Checklists, June 2012.    (2)                           Pj MARIN, Ezra CASTRO, Asmita DD, et al. A contemporary prostate   cancer grading system: A validating  alternative to the [place] score. Eur Urol (2015)    Electronically signed out by:    ROLA Allen M.D.    CLINICAL HISTORY:  57-year-old male with elevated prostatic specific antigen (PSA).    GROSS:  Two specimen  "containers with formalin are received labeled with the   patient's name, date of birth, and medical  record number.  Information on the requisition slip, containers, and   associated labels is confirmed.    A. The specimen is designated \"right prostate\" consisting of seven needle   biopsies which range from 0.6-1.2 cm  in length.  Additionally in the container are multiple fragments of tissue   measuring up to 0.4 cm in  aggregate.  The entire specimen is placed on a tissue wrap.  All submitted   in one cassette.    B.  The specimen is designated \"left prostate\" consisting of multiple   fragments of needle biopsies which range  from 0.2-0.4 cm in length.  Inked sherif                          k.  The entire specimen is placed on   a tissue wrap.  All submitted in  one cassette. (Dictated by: Sharita Espinal 2/2/2018 04:03 PM)    MICROSCOPIC:  Microscopic examination was performed.  Diagnosis was assisted with   evaluation of immunohistochemical stains  to P504S/p63.    The immunohistochemical stain control was reviewed showing an appropriate   reaction pattern for interpretation.  (Dictated by: ADRIENNE Allen MD 02/06/2018)    The following ASR disclaimer is in reference to the stain(s) listed below:      Analyte Specific Reagents (ASRs)  are used in many laboratory tests necessary for standard medical care and   generally do not require FDA  approval.  This test was developed and its performance characteristics   determined by [facility] [place].  It has not been cleared   or approved by the U.S. Food and Drug  Administration.  P504s/p63 (ASR)  P504s/p63 (ASR)    CPT Codes:  A: 19588.051, 64743-FFU-LJP  B: 82048.051, 8834                          4-ASR-IHC    TESTING LAB LOCATION:  [facility], [address] [place] [id number] [phone]    COLLECTION SITE:  Client: [facility]" [place], [place]  Location: [facility] (B)       We also called and got his colonoscopy report faxed to us from [facility].  He had a few polyps removed which were adenomatous and a repeat exam was recommended in 3 years.    ASSESSMENT/PLAN:       ICD-10-CM    1. Prostate cancer (H) C61 UROLOGY ADULT REFERRAL   2. PAF (paroxysmal atrial fibrillation) (H) I48.0    3. Elevated blood pressure reading without diagnosis of hypertension R03.0    4. History of colonic polyps Z86.010    5. Need for prophylactic vaccination against Streptococcus pneumoniae (pneumococcus) Z23 PNEUMOCOCCAL CONJ VACCINE 13 VALENT IM     ADMIN PNEUMOCOCCAL VACCINE     We had a general discussion about treatment options for prostate cancer and I will refer him to [facility] urology for a second opinion on this  I advised him to resume the Xarelto at this point  Discussed his elevated blood pressure reading and we will want to follow that closely for potential treatment with medication  Plan a repeat colonoscopy in 3 years  I recommended pneumococcal vaccination and he was agreeable, so we will give him a Prevnar 13 today  I will likely see him back in the near future but for a preoperative exam before undergoing some type of procedure for his prostate cancer and urinary obstructive symptoms    Andrea Aquino MD  [facility]

## 2018-02-22 NOTE — MR AVS SNAPSHOT
After Visit Summary   2/22/2018    Andre Alas    MRN: [id number]           Patient Information     Date Of Birth          1950        Visit Information        Provider Department      2/22/2018 3:00 PM Andrea Aquino MD [facility]        Today's Diagnoses     Prostate cancer (H)    -  1    PAF (paroxysmal atrial fibrillation) (H)        Elevated blood pressure reading without diagnosis of hypertension        Need for prophylactic vaccination against Streptococcus pneumoniae (pneumococcus)           Follow-ups after your visit        Additional Services     UROLOGY ADULT REFERRAL       Your provider has referred you to: [facility]: [place] [facility] - [place] [phone]   [email or web address]    Please be aware that coverage of these services is subject to the terms and limitations of your health insurance plan.  Call member services at your health plan with any benefit or coverage questions.      Please bring the following with you to your appointment:    (1) Any X-Rays, CTs or MRIs which have been performed.  Contact the facility where they were done to arrange for  prior to your scheduled appointment.    (2) List of current medications  (3) This referral request   (4) Any documents/labs given to you for this referral                  Who to contact     If you have questions or need follow up information about today's clinic visit or your schedule please contact [facility] directly at [phone].  Normal or non-critical lab and imaging results will be communicated to you by MyChart, letter or phone within 4 business days after the clinic has received the results. If you do not hear from us within 7 days, please contact the clinic through MyChart or phone. If you have a critical or abnormal lab result, we will notify you by phone as soon as possible.  Submit refill requests through [employer] or call your  "pharmacy and they will forward the refill request to us. Please allow 3 business days for your refill to be completed.          Additional Information About Your Visit        MyChart Information     [facility] lets you send messages to your doctor, view your test results, renew your prescriptions, schedule appointments and more. To sign up, go to [email or web address] . Click on \"Log in\" on the left side of the screen, which will take you to the Welcome page. Then click on \"Sign up Now\" on the right side of the page.     You will be asked to enter the access code listed below, as well as some personal information. Please follow the directions to create your username and password.     Your access code is: [email or web address]  Expires: 2018  3:47 PM     Your access code will  in 90 days. If you need help or a new code, please call your [facility] clinic or [phone].        Care EveryWhere ID     This is your Care EveryWhere ID. This could be used by other organizations to access your [facility] medical records  [id number]        Your Vitals Were     Pulse Temperature Height Pulse Oximetry BMI (Body Mass Index)       72 97.4  F (36.3  C) (Oral) 5' 9\" (1.753 m) 95% 24.81 kg/m2        Blood Pressure from Last 3 Encounters:   18 150/89   18 141/87   17 130/78    Weight from Last 3 Encounters:   18 168 lb (76.2 kg)   17 173 lb (78.5 kg)   07/07/15 167 lb (75.8 kg)              We Performed the Following     ADMIN PNEUMOCOCCAL VACCINE     PNEUMOCOCCAL CONJ VACCINE 13 VALENT IM     UROLOGY ADULT REFERRAL        Primary Care Provider Office Phone # Fax #    Marybeth Gaffney [phone] [address] [facility] [place] [address]        Equal Access to Services     ROBERT HINOJOSA : Ezra Peraza, larisa borges, qaclara garcia . [facility] [phone].    ATENCIÓN: Si moses santamaria, tiene a espino disposición " servicios gratuitos de asistencia lingüística. Abe gutierrez [phone].    We comply with applicable federal civil rights laws and [place] laws. We do not discriminate on the basis of race, color, national origin, age, disability, sex, sexual orientation, or gender identity.            Thank you!     Thank you for choosing [facility]  for your care. Our goal is always to provide you with excellent care. Hearing back from our patients is one way we can continue to improve our services. Please take a few minutes to complete the written survey that you may receive in the mail after your visit with us. Thank you!             Your Updated Medication List - Protect others around you: Learn how to safely use, store and throw away your medicines at www.disposemymeds.org.          This list is accurate as of 2/22/18  3:47 PM.  Always use your most recent med list.                   Brand Name Dispense Instructions for use Diagnosis    ASPIRIN NOT PRESCRIBED    INTENTIONAL    0 each    Please choose reason not prescribed, below    Old myocardial infarction       rivaroxaban ANTICOAGULANT 20 MG Tabs tablet    XARELTO     Take 15 mg by mouth daily (with dinner)    PAF (paroxysmal atrial fibrillation) (H)       VITAMIN B COMPLEX PO      Take 2 tablets by mouth. Every 2 weeks        VITAMIN C PO      Take 3 tablets by mouth. Every 2 weeks        vitamin E 200 UNIT capsule      Take 2 capsules by mouth. Every 2 weeks        Zinc Gluconate 100 MG Tabs      Take 2 tablets by mouth. Every 2 weeks

## 2018-02-22 NOTE — NURSING NOTE
"Chief Complaint   Patient presents with     Referral     Second opinion for prostate cancer and treatment options     Health Maintenance     Colonoscopy, Prevnar 13       Initial /89 (BP Location: Left arm, Patient Position: Chair, Cuff Size: Adult Regular)  Pulse 72  Temp 97.4  F (36.3  C) (Oral)  Ht 5' 9\" (1.753 m)  Wt 168 lb (76.2 kg)  SpO2 95%  BMI 24.81 kg/m2 Estimated body mass index is 24.81 kg/(m^2) as calculated from the following:    Height as of this encounter: 5' 9\" (1.753 m).    Weight as of this encounter: 168 lb (76.2 kg).  Medication Reconciliation: complete   Colonoscopy done on 12/27/17 at [facility]       "

## 2018-03-12 ENCOUNTER — PRE VISIT (OUTPATIENT)
Dept: UROLOGY | Facility: CLINIC | Age: 68
End: 2018-03-12

## 2018-03-12 NOTE — TELEPHONE ENCOUNTER
Prostate cancer- second opinion patient of Dr. Guadarrama.  Records available in [facility].

## 2018-03-22 ENCOUNTER — OFFICE VISIT (OUTPATIENT)
Dept: UROLOGY | Facility: CLINIC | Age: 68
End: 2018-03-22
Payer: COMMERCIAL

## 2018-03-22 VITALS
WEIGHT: 170 LBS | HEART RATE: 67 BPM | SYSTOLIC BLOOD PRESSURE: 154 MMHG | HEIGHT: 69 IN | BODY MASS INDEX: 25.18 KG/M2 | DIASTOLIC BLOOD PRESSURE: 90 MMHG

## 2018-03-22 DIAGNOSIS — C61 PROSTATE CANCER (H): Primary | ICD-10-CM

## 2018-03-22 RX ORDER — RIVAROXABAN 15 MG/1
TABLET, FILM COATED ORAL
COMMUNITY
Start: 2017-10-17 | End: 2018-09-07

## 2018-03-22 RX ORDER — MUPIROCIN 20 MG/G
OINTMENT TOPICAL
COMMUNITY
Start: 2018-01-26 | End: 2018-09-07

## 2018-03-22 RX ORDER — CEPHALEXIN 500 MG/1
CAPSULE ORAL
Refills: 0 | COMMUNITY
Start: 2018-01-18 | End: 2018-06-14

## 2018-03-22 RX ORDER — CIPROFLOXACIN 500 MG/1
TABLET, FILM COATED ORAL
Refills: 0 | COMMUNITY
Start: 2018-01-18 | End: 2018-06-14

## 2018-03-22 ASSESSMENT — PAIN SCALES - GENERAL: PAINLEVEL: NO PAIN (0)

## 2018-03-22 NOTE — PATIENT INSTRUCTIONS
Schedule appointment for prostate MRI.  Dr. Méndez will notify you of what your follow up will be.    Please follow up with your Primary MD regarding your elevated blood pressure.    It was a pleasure meeting with you today.  Thank you for allowing me and my team the privilege of caring for you today.  YOU are the reason we are here, and I truly hope we provided you with the excellent service you deserve.  Please let us know if there is anything else we can do for you so that we can be sure you are leaving completely satisfied with your care experience.      OVIDIO Boswell

## 2018-03-22 NOTE — LETTER
3/22/2018       RE: Andre Alas  [address]  [facility] [address]     Dear Colleague,    Thank you for referring your patient, Andre Alas, to the [facility] UROLOGY AND INST FOR PROSTATE AND UROLOGIC CANCERS at [facility]. Please see a copy of my visit note below.          Chief Complaint:    Localized Prostate Cancer         Consult or Referral:     Mr. Andre Alas is a 67 year old male seen in consultation from Dr. Guadarrama.         History of Present Illness:    Andre Alas is a very pleasant 67 year old male who presents with a history of Prostate Cancer.   The clinical stage is cT1c, initial PSA was 10.7 and biopsy acacia score is 3+3 in 5 of 12 cores.  Volume of core(s) affected by prostate cancer ranged from 5% to 7%.  JAMIE by Dr. Guadarrama on 12/5/17 was normal.  The prostate size was measured and estimated to be to be 60 grams.  PSA density is 0.18.    He reports one episode of gross hematuria lasting for 3 days in November 2017.     He endorses significant voiding symptoms including urgency, frequency, and incomplete emptying. AUASI from 12/5/17 was 17 with QOL 2. He also has a history of urinary retention. He is not currently taking any medications for his symptoms.          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     Hyperlipidemia LDL goal <70 11/28/2011     NSTEMI (non-ST elevated myocardial infarction) (H) 7/2014    prob sec to PAF -- no sig CAD     PAF (paroxysmal atrial fibrillation) (H) 7/2014     Prostate cancer (H) 02/2018            Past Surgical History:     Past Surgical History:   Procedure Laterality Date     ANGIOGRAM  2014    normal - non ST MI     DENTAL SURGERY  2000     HERNIA REPAIR, INGUINAL RT/LT  1952            Medications     Current  Outpatient Prescriptions   Medication     cephALEXin (KEFLEX) 500 MG capsule     ciprofloxacin (CIPRO) 500 MG tablet     mupirocin (BACTROBAN) 2 % ointment     XARELTO 15 MG TABS tablet     ASPIRIN NOT PRESCRIBED (INTENTIONAL)     rivaroxaban ANTICOAGULANT (XARELTO) 20 MG TABS tablet     Ascorbic Acid (VITAMIN C PO)     B Complex Vitamins (VITAMIN B COMPLEX PO)     Zinc Gluconate (ZINC) 100 MG TABS     vitamin E 200 UNIT capsule     No current facility-administered medications for this visit.             Family History:     Family History   Problem Relation Age of Onset     Heart Failure Mother 73     CEREBROVASCULAR DISEASE Father 70     HEART DISEASE Father 91     Pacemaker     KIDNEY DISEASE Father      stones      Cancer - colorectal No family hx of      Prostate Cancer No family hx of      DIABETES No family hx of      Hypertension No family hx of             Social History:     Social History     Social History     Marital status:      Spouse name: Ludmila     Number of children: 2     Years of education: N/A     Occupational History      [employer]     Social History Main Topics     Smoking status: Never Smoker     Smokeless tobacco: Never Used     Alcohol use No     Drug use: No     Sexual activity: Yes     Partners: Female     Birth control/ protection: Condom     Other Topics Concern     Parent/Sibling W/ Cabg, Mi Or Angioplasty Before 65f 55m? No     Social History Narrative            Allergies:   Dust mites and Mold         Review of Systems:  From intake questionnaire     Negative 14 system review except as noted on HPI, nurse's note.        Labs and Pathology:    I reviewed all applicable laboratory and pathology data and went over findings with patient  Significant for   PSA   Date Value Ref Range Status   01/12/2018 11.90 (H) 0 - 4 ug/L Final     Comment:     Assay Method:  Chemiluminescence using Siemens Vista analyzer   12/05/2017 10.70 (H) 0 - 4 ug/L Final      Comment:     Assay Method:  Chemiluminescence using Siemens Vista analyzer           Imaging:    I reviewed all applicable imaging and went over findings with patient.  Significant for CT Abd/Pelvis 11/22/17:    FINDINGS: The lung bases are clear.     Abdomen: Simple cyst is present in the left hepatic dome on series 3,  image 8 measuring 1.6 cm in diameter. Liver is otherwise unremarkable.  The spleen, gallbladder, pancreas and left adrenal gland are  unremarkable. Right adrenal nodule is indeterminate measuring 1 cm in  diameter on series 3, image 16. The remainder of the liver, spleen,  pancreas and left adrenal gland are unremarkable. Indeterminate right  adrenal nodule measures 1 cm in diameter on series 3, image 16. The  bowel is normal in caliber without obstruction or diverticulitis. Mild  circumferential colonic wall thickening is noted in the region of the  splenic flexure on series 2, image 17 and series 3, image 17.  Peristaltic contraction is possible but malignancy cannot be excluded.     Right urinary tract:  No renal masses or cysts are present. No  collecting system or ureteral stones are present. There is no  hydronephrosis. Delayed imaging demonstrates a normal right renal  collecting system and ureter. Ureter is normal in caliber and course.     Left urinary tract:  No renal masses or cysts are present. No  collecting system or ureteral stones are present. There is no  hydronephrosis. Delayed imaging demonstrates a normal left renal  collecting system and ureter. Ureter is normal in caliber and course.     Bladder:  No bladder calculi are appreciated. No bladder wall  thickening or nodularity is appreciated. Delayed images show no  evidence of filling defects. Mass effect upon the bladder base is  caused by an enlarged prostate gland.     Pelvis:  Prostate gland is enlarged. Rectum is unremarkable. Calcified  pelvic phleboliths are present. No enlarged pelvic lymph nodes or free  fluid. Bone  window examination demonstrates degenerative spine  changes.         IMPRESSION:  1. No evidence of urinary tract calculi or hydronephrosis. No renal  cyst or mass. Collecting systems, ureters and bladder are  unremarkable. Prostate gland is enlarged causing mass effect upon the  bladder base.  2. Circumferential wall thickening in the splenic flexure of the colon  possibly peristalsis but an underlying malignancy cannot be excluded.  Correlate with colonoscopy if not recently performed.  3. Simple appearing left hepatic lobe cyst. No further follow-up  required.      Outside and Past Medical records:    I spent 10 minutes reviewing outside and past medical records.     Standardized Questionnaire:      Not completed today.          Assessment and Plan:     Assessment:   Low risk Localized Prostate Cancer, We[place] 6    Plan:  We had an extensive discussion about the significance of localized, prostate cancer.  His estimated risk of extraprostatic disease is less than 2% therefore no additional diagnostic imaging is indicated at this time.     We discussed the options for treatment of a localized prostate cancer including active surveillance (reviewing the [place] experience), brachytherapy, external beam, and  proton beam radiation therapy, as well as surgical extirpation.  We briefly mentioned cryotherapy, but the results are not great in the primary setting and they almost uniformly lead to loss of erections.  We discussed the fact that all prostate cancer treatments leads to the loss or decrease in sexual function.  This loss usually occurs immediately with surgery and then improves as the patient heals and with radiation there is usually no effect, then it drops off at a faster than expected pace such that 2-3 years down the road, the number of men suffering from ED after treatment for their prostate cancer is approximately equal.    We also discussed the advantages and disadvantages and roles of open surgery  vs. laparoscopic (and Da Tati assisted) surgery.  I noted that though robotic surgery has been associated with shorter hospitalization, shorter time to complete recovery, fewer blood transfusions and lower risk of bladder neck contractures, in the most important domains, cancer control, preservation of urinary function and preservation of sexual function, the outcomes have been quite comparable.    The anticipated post-operative course was explained, including the anticipated hospital stay.  With surgery we discussed the need for a catheter post-operatively for between 7-10 days to serve as a scaffolding for the bladder neck to heal to the urethra.  We also discussed the risk of post operative urinary incontinence once the cathter is removed.  We discussed that we would recommend pelvic floor physical therapy and that sometimes urinary recovery takes several months to up to a year to recover control.  Approximately 90-95% of men are continent at one year after surgery, but most men describe that their continence is different after surgery    We also discussed HoLEP as an alternative surgical approach to treat voiding symptoms given the low-risk nature of this patient's prostate cancer.  Would likely refer to Dr. Oliveros if patient decides to proceed with this.     At this time, we will plan to obtain an MRI of the prostate prior to deciding on further treatment to try and rule out any high risk disease.      Orders  Orders Placed This Encounter   Procedures     MRI Prostate         Scribe Disclosure:   I,Joseph Hogan, am serving as a scribe; to document services personally performed by Abelino Méndez MD based on data collection and the provider's statements to me.     Abelino Méndez MD  Department of Urology Staff  [facility]    Attestation:  This patient was seen and evaluated by me, with a scribe taking notes.  I have reviewed the note above and agree.  The physical exam and or  "any procedures were performed by me and the pertinant details are outlined below.            Physical Exam:     Patient is a 67 year old  male   Vitals: Blood pressure 154/90, pulse 67, height 1.753 m (5' 9\"), weight 77.1 kg (170 lb).  General Appearance Adult: Alert, no acute distress, oriented  HENT: throat/mouth:normal, good dentition  Lungs: no respiratory distress, or pursed lip breathing  Heart: No obvious jugular venous distension present  Abdomen: soft, nontender, no organomegaly or masses, Body mass index is 25.1 kg/(m^2).  Lymphatics: No cervical or supraclavicular adenopathy  Musculoskeltal: extremities normal, no peripheral edema  Skin: no suspicious lesions or rashes  Neuro: Alert, oriented, speech and mentation normal  Psych: affect and mood normal  Gait: Normal  : deferred      Assessment/Plan: low risk prostate cancer     AS  MRI to assure no missed large volume or high risk disease.    CC  Patient Care Team:  Marybeth Gaffney MD as PCP - General (Family Practice)  DON CONDE    Copy to patient  GABINO CLANCY  [address] [facility] [address]    Again, thank you for allowing me to participate in the care of your patient.      Sincerely,    Abelino Méndez MD      "

## 2018-03-22 NOTE — MR AVS SNAPSHOT
After Visit Summary   3/22/2018    Andre Alas    MRN: [id number]           Patient Information     Date Of Birth          1950        Visit Information        Provider Department      3/22/2018 12:40 PM Abelino Médnez MD [facility]        Today's Diagnoses     Prostate cancer (H)    -  1      Care Instructions    Schedule appointment for prostate MRI.  Dr. Méndez will notify you of what your follow up will be.    Please follow up with your Primary MD regarding your elevated blood pressure.    It was a pleasure meeting with you today.  Thank you for allowing me and my team the privilege of caring for you today.  YOU are the reason we are here, and I truly hope we provided you with the excellent service you deserve.  Please let us know if there is anything else we can do for you so that we can be sure you are leaving completely satisfied with your care experience.      OVIDIO Boswell          Follow-ups after your visit        Your next 10 appointments already scheduled     Mar 31, 2018  7:00 AM CDT   (Arrive by 6:45 AM)   MR PROSTATE with [id number]   [facility] ([facility])    [address] [place] [id number] [phone]           Take your medicines as usual, unless your doctor tells you not to. Bring a list of your current medicines to your exam (including vitamins, minerals and over-the-counter drugs).  You may or may not receive intravenous (IV) contrast for this exam pending the discretion of the Radiologist.  You do not need to do anything special to prepare.  The MRI machine uses a strong magnet. Please wear clothes without metal (snaps, zippers). A sweatsuit works well, or we may give you a hospital gown.  Please remove any body piercings and hair extensions before you arrive. You will also remove watches, jewelry, hairpins, wallets, dentures, partial  dental plates and hearing aids. You may wear contact lenses, and you may be able to wear your rings. We have a safe place to keep your personal items, but it is safer to leave them at home.  **IMPORTANT** THE INSTRUCTIONS BELOW ARE ONLY FOR THOSE PATIENTS WHO HAVE BEEN PRESCRIBED SEDATION OR GENERAL ANESTHESIA DURING THEIR MRI PROCEDURE:  IF YOUR DOCTOR PRESCRIBED ORAL SEDATION (take medicine to help you relax during your exam):   You must get the medicine from your doctor (oral medication) before you arrive. Bring the medicine to the exam. Do not take it at home. You ll be told when to take it upon arriving for your exam.   Arrive one hour early. Bring someone who can take you home after the test. Your medicine will make you sleepy. After the exam, you may not drive, take a bus or take a taxi by yourself.  IF YOUR DOCTOR PRESCRIBED IV SEDATION:   Arrive one hour early. Bring someone who can take you home after the test. Your medicine will make you sleepy. After the exam, you may not drive, take a bus or take a taxi by yourself.   No eating 6 hours before your exam. You may have clear liquids up until 4 hours before your exam. (Clear liquids include water, clear tea, black coffee and fruit juice without pulp.)  IF YOUR DOCTOR PRESCRIBED ANESTHESIA (be asleep for your exam):   Arrive 1 1/2 hours early. Bring someone who can take you home after the test. You may not drive, take a bus or take a taxi by yourself.   No eating 8 hours before your exam. You may have clear liquids up until 4 hours before your exam. (Clear liquids include water, clear tea, black coffee and fruit juice without pulp.)   You will spend four to five hours in the recovery room.  Please call the Imaging Department at your exam site with any questions.              Future tests that were ordered for you today     Open Future Orders        Priority Expected Expires Ordered    MRI Prostate Routine  3/22/2019 3/22/2018            Who to contact      "Please call your clinic at [phone] to:    Ask questions about your health    Make or cancel appointments    Discuss your medicines    Learn about your test results    Speak to your doctor            Additional Information About Your Visit        MyChart Information     [employer] is an electronic gateway that provides easy, online access to your medical records. With [employer], you can request a clinic appointment, read your test results, renew a prescription or communicate with your care team.     To sign up for [employer] visit the website at [email or web address]   You will be asked to enter the access code listed below, as well as some personal information. Please follow the directions to create your username and password.     Your access code is: [email or web address]  Expires: 2018  4:47 PM     Your access code will  in 90 days. If you need help or a new code, please contact your [facility] or call [phone] for assistance.        Care EveryWhere ID     This is your Care EveryWhere ID. This could be used by other organizations to access your [facility] medical records  [id number]        Your Vitals Were     Pulse Height BMI (Body Mass Index)             67 1.753 m (5' 9\") 25.1 kg/m2          Blood Pressure from Last 3 Encounters:   18 154/90   18 141/90   18 141/87    Weight from Last 3 Encounters:   18 77.1 kg (170 lb)   18 76.2 kg (168 lb)   17 78.5 kg (173 lb)               Primary Care Provider Office Phone # Fax #    Marybeth Gaffney [phone] [address] [facility] [address]        Equal Access to Services     [facility] Center[facility] AH: Hadii shamar rosa Soricki, waaxda luqadaha, qaybta kaalmada manishada, clara cavazos. So [facility] [phone].    ATENCIÓN: Si habla español, tiene a espino disposición servicios gratuitos de asistencia lingüística. Llame al [phone].    We comply " with applicable federal civil rights laws and [place] laws. We do not discriminate on the basis of race, color, national origin, age, disability, sex, sexual orientation, or gender identity.            Thank you!     Thank you for choosing [facility] FOR PROSTATE AND UROLOGIC CANCERS  for your care. Our goal is always to provide you with excellent care. Hearing back from our patients is one way we can continue to improve our services. Please take a few minutes to complete the written survey that you may receive in the mail after your visit with us. Thank you!             Your Updated Medication List - Protect others around you: Learn how to safely use, store and throw away your medicines at www.disposemymeds.org.          This list is accurate as of 3/22/18  2:11 PM.  Always use your most recent med list.                   Brand Name Dispense Instructions for use Diagnosis    ASPIRIN NOT PRESCRIBED    INTENTIONAL    0 each    Please choose reason not prescribed, below    Old myocardial infarction       cephALEXin 500 MG capsule    KEFLEX          ciprofloxacin 500 MG tablet    CIPRO          mupirocin 2 % ointment    BACTROBAN          * XARELTO 15 MG Tabs tablet   Generic drug:  rivaroxaban ANTICOAGULANT           * rivaroxaban ANTICOAGULANT 20 MG Tabs tablet    XARELTO     Take 15 mg by mouth daily (with dinner)    PAF (paroxysmal atrial fibrillation) (H)       VITAMIN B COMPLEX PO      Take 2 tablets by mouth. Every 2 weeks        VITAMIN C PO      Take 3 tablets by mouth. Every 2 weeks        vitamin E 200 UNIT capsule      Take 2 capsules by mouth. Every 2 weeks        Zinc Gluconate 100 MG Tabs      Take 2 tablets by mouth. Every 2 weeks        * Notice:  This list has 2 medication(s) that are the same as other medications prescribed for you. Read the directions carefully, and ask your doctor or other care provider to review them with you.

## 2018-03-22 NOTE — NURSING NOTE
Chief Complaint   Patient presents with     Consult     Prostate cancer- second opinion patient of Dr. Guadarrama.      Claire Page

## 2018-03-22 NOTE — PROGRESS NOTES
Chief Complaint:    Localized Prostate Cancer         Consult or Referral:     Mr. Andre Alas is a 67 year old male seen in consultation from Dr. Guadarrama.         History of Present Illness:    Andre Alas is a very pleasant 67 year old male who presents with a history of Prostate Cancer.   The clinical stage is cT1c, initial PSA was 10.7 and biopsy acacia score is 3+3 in 5 of 12 cores.  Volume of core(s) affected by prostate cancer ranged from 5% to 7%.  JAMIE by Dr. Guadarrama on 12/5/17 was normal.  The prostate size was measured and estimated to be to be 60 grams.  PSA density is 0.18.    He reports one episode of gross hematuria lasting for 3 days in November 2017.     He endorses significant voiding symptoms including urgency, frequency, and incomplete emptying. AUASI from 12/5/17 was 17 with QOL 2. He also has a history of urinary retention. He is not currently taking any medications for his symptoms.          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     Hyperlipidemia LDL goal <70 11/28/2011     NSTEMI (non-ST elevated myocardial infarction) (H) 7/2014    prob sec to PAF -- no sig CAD     PAF (paroxysmal atrial fibrillation) (H) 7/2014     Prostate cancer (H) 02/2018            Past Surgical History:     Past Surgical History:   Procedure Laterality Date     ANGIOGRAM  2014    normal - non ST MI     DENTAL SURGERY  2000     HERNIA REPAIR, INGUINAL RT/LT  1952            Medications     Current Outpatient Prescriptions   Medication     cephALEXin (KEFLEX) 500 MG capsule     ciprofloxacin (CIPRO) 500 MG tablet     mupirocin (BACTROBAN) 2 % ointment     XARELTO 15 MG TABS tablet     ASPIRIN NOT PRESCRIBED (INTENTIONAL)     rivaroxaban ANTICOAGULANT (XARELTO) 20 MG TABS tablet     Ascorbic Acid (VITAMIN C PO)     B  Complex Vitamins (VITAMIN B COMPLEX PO)     Zinc Gluconate (ZINC) 100 MG TABS     vitamin E 200 UNIT capsule     No current facility-administered medications for this visit.             Family History:     Family History   Problem Relation Age of Onset     Heart Failure Mother 73     CEREBROVASCULAR DISEASE Father 70     HEART DISEASE Father 91     Pacemaker     KIDNEY DISEASE Father      stones      Cancer - colorectal No family hx of      Prostate Cancer No family hx of      DIABETES No family hx of      Hypertension No family hx of             Social History:     Social History     Social History     Marital status:      Spouse name: Ludmila     Number of children: 2     Years of education: N/A     Occupational History      [employer]     Social History Main Topics     Smoking status: Never Smoker     Smokeless tobacco: Never Used     Alcohol use No     Drug use: No     Sexual activity: Yes     Partners: Female     Birth control/ protection: Condom     Other Topics Concern     Parent/Sibling W/ Cabg, Mi Or Angioplasty Before 65f 55m? No     Social History Narrative            Allergies:   Dust mites and Mold         Review of Systems:  From intake questionnaire     Negative 14 system review except as noted on HPI, nurse's note.        Labs and Pathology:    I reviewed all applicable laboratory and pathology data and went over findings with patient  Significant for   PSA   Date Value Ref Range Status   01/12/2018 11.90 (H) 0 - 4 ug/L Final     Comment:     Assay Method:  Chemiluminescence using Siemens Vista analyzer   12/05/2017 10.70 (H) 0 - 4 ug/L Final     Comment:     Assay Method:  Chemiluminescence using Siemens Vista analyzer           Imaging:    I reviewed all applicable imaging and went over findings with patient.  Significant for CT Abd/Pelvis 11/22/17:    FINDINGS: The lung bases are clear.     Abdomen: Simple cyst is present in the left hepatic dome on series  3,  image 8 measuring 1.6 cm in diameter. Liver is otherwise unremarkable.  The spleen, gallbladder, pancreas and left adrenal gland are  unremarkable. Right adrenal nodule is indeterminate measuring 1 cm in  diameter on series 3, image 16. The remainder of the liver, spleen,  pancreas and left adrenal gland are unremarkable. Indeterminate right  adrenal nodule measures 1 cm in diameter on series 3, image 16. The  bowel is normal in caliber without obstruction or diverticulitis. Mild  circumferential colonic wall thickening is noted in the region of the  splenic flexure on series 2, image 17 and series 3, image 17.  Peristaltic contraction is possible but malignancy cannot be excluded.     Right urinary tract:  No renal masses or cysts are present. No  collecting system or ureteral stones are present. There is no  hydronephrosis. Delayed imaging demonstrates a normal right renal  collecting system and ureter. Ureter is normal in caliber and course.     Left urinary tract:  No renal masses or cysts are present. No  collecting system or ureteral stones are present. There is no  hydronephrosis. Delayed imaging demonstrates a normal left renal  collecting system and ureter. Ureter is normal in caliber and course.     Bladder:  No bladder calculi are appreciated. No bladder wall  thickening or nodularity is appreciated. Delayed images show no  evidence of filling defects. Mass effect upon the bladder base is  caused by an enlarged prostate gland.     Pelvis:  Prostate gland is enlarged. Rectum is unremarkable. Calcified  pelvic phleboliths are present. No enlarged pelvic lymph nodes or free  fluid. Bone window examination demonstrates degenerative spine  changes.         IMPRESSION:  1. No evidence of urinary tract calculi or hydronephrosis. No renal  cyst or mass. Collecting systems, ureters and bladder are  unremarkable. Prostate gland is enlarged causing mass effect upon the  bladder base.  2. Circumferential wall  thickening in the splenic flexure of the colon  possibly peristalsis but an underlying malignancy cannot be excluded.  Correlate with colonoscopy if not recently performed.  3. Simple appearing left hepatic lobe cyst. No further follow-up  required.      Outside and Past Medical records:    I spent 10 minutes reviewing outside and past medical records.     Standardized Questionnaire:      Not completed today.          Assessment and Plan:     Assessment:   Low risk Localized Prostate Cancer, Aleksandar 6    Plan:  We had an extensive discussion about the significance of localized, prostate cancer.  His estimated risk of extraprostatic disease is less than 2% therefore no additional diagnostic imaging is indicated at this time.     We discussed the options for treatment of a localized prostate cancer including active surveillance (reviewing the [place] experience), brachytherapy, external beam, and  proton beam radiation therapy, as well as surgical extirpation.  We briefly mentioned cryotherapy, but the results are not great in the primary setting and they almost uniformly lead to loss of erections.  We discussed the fact that all prostate cancer treatments leads to the loss or decrease in sexual function.  This loss usually occurs immediately with surgery and then improves as the patient heals and with radiation there is usually no effect, then it drops off at a faster than expected pace such that 2-3 years down the road, the number of men suffering from ED after treatment for their prostate cancer is approximately equal.    We also discussed the advantages and disadvantages and roles of open surgery vs. laparoscopic (and Da Tati assisted) surgery.  I noted that though robotic surgery has been associated with shorter hospitalization, shorter time to complete recovery, fewer blood transfusions and lower risk of bladder neck contractures, in the most important domains, cancer control, preservation of urinary function  "and preservation of sexual function, the outcomes have been quite comparable.    The anticipated post-operative course was explained, including the anticipated hospital stay.  With surgery we discussed the need for a catheter post-operatively for between 7-10 days to serve as a scaffolding for the bladder neck to heal to the urethra.  We also discussed the risk of post operative urinary incontinence once the cathter is removed.  We discussed that we would recommend pelvic floor physical therapy and that sometimes urinary recovery takes several months to up to a year to recover control.  Approximately 90-95% of men are continent at one year after surgery, but most men describe that their continence is different after surgery    We also discussed HoLEP as an alternative surgical approach to treat voiding symptoms given the low-risk nature of this patient's prostate cancer.  Would likely refer to Dr. Oliveros if patient decides to proceed with this.     At this time, we will plan to obtain an MRI of the prostate prior to deciding on further treatment to try and rule out any high risk disease.      Orders  Orders Placed This Encounter   Procedures     MRI Prostate         Scribe Disclosure:   I,Joseph Hogan, am serving as a scribe; to document services personally performed by Abelino Méndez MD based on data collection and the provider's statements to me.     Abelino Méndez MD  Department of Urology Staff  [facility]    Attestation:  This patient was seen and evaluated by me, with a scribe taking notes.  I have reviewed the note above and agree.  The physical exam and or any procedures were performed by me and the pertinant details are outlined below.            Physical Exam:     Patient is a 67 year old  male   Vitals: Blood pressure 154/90, pulse 67, height 1.753 m (5' 9\"), weight 77.1 kg (170 lb).  General Appearance Adult: Alert, no acute distress, oriented  HENT: throat/mouth:normal, " good dentition  Lungs: no respiratory distress, or pursed lip breathing  Heart: No obvious jugular venous distension present  Abdomen: soft, nontender, no organomegaly or masses, Body mass index is 25.1 kg/(m^2).  Lymphatics: No cervical or supraclavicular adenopathy  Musculoskeltal: extremities normal, no peripheral edema  Skin: no suspicious lesions or rashes  Neuro: Alert, oriented, speech and mentation normal  Psych: affect and mood normal  Gait: Normal  : deferred      Assessment/Plan: low risk prostate cancer     AS  MRI to assure no missed large volume or high risk disease.    Abelino Méndez MD  Department of Urology  [facility]      CC  Patient Care Team:  Marybeth Gaffney MD as PCP - General (Family Practice)  DON CONDE    Copy to patient  GABINO ESCUDERO HARMONYNONI  [address] [facility] [id number]

## 2018-03-31 ENCOUNTER — RADIANT APPOINTMENT (OUTPATIENT)
Dept: MRI IMAGING | Facility: CLINIC | Age: 68
End: 2018-03-31
Attending: UROLOGY
Payer: COMMERCIAL

## 2018-03-31 DIAGNOSIS — C61 PROSTATE CANCER (H): ICD-10-CM

## 2018-03-31 RX ORDER — GADOBUTROL 604.72 MG/ML
7.5 INJECTION INTRAVENOUS ONCE
Status: COMPLETED | OUTPATIENT
Start: 2018-03-31 | End: 2018-03-31

## 2018-03-31 RX ADMIN — GADOBUTROL 7.5 ML: 604.72 INJECTION INTRAVENOUS at 07:15

## 2018-03-31 NOTE — DISCHARGE INSTRUCTIONS
MRI Contrast Discharge Instructions    The IV contrast you received today will pass out of your body in your  urine. This will happen in the next 24 hours. You will not feel this process.  Your urine will not change color.    Drink at least 4 extra glasses of water or juice today (unless your doctor  has restricted your fluids). This reduces the stress on your kidneys.  You may take your regular medicines.    If you are on dialysis: It is best to have dialysis today.    If you have a reaction: Most reactions happen right away. If you have  any new symptoms after leaving the hospital (such as hives or swelling),  call your hospital at the correct number below. Or call your family doctor.  If you have breathing distress or wheezing, call 911.    Special instructions: ***    I have read and understand the above information.    Signature:______________________________________ Date:___________    Staff:__________________________________________ Date:___________     Time:__________    [place] [facility] Departments:    ___[place]: [phone]  ___[facility]: [phone]  ___[place]: [phone] ___[facility]: [phone]  ___[facility]: [phone]  ___[facility]: [phone]  ___[phone]  ___[facility]: [phone]  ___[phone]

## 2018-04-09 ENCOUNTER — TELEPHONE (OUTPATIENT)
Dept: UROLOGY | Facility: CLINIC | Age: 68
End: 2018-04-09

## 2018-04-09 NOTE — TELEPHONE ENCOUNTER
Patient had an MRI on 3.31.18.   We discussed the PIRADs score of 3.    Patient would like to know what the plan is going forward.    He would like to discuss with Dr Méndez in person.    Message sent to Dr Méndez to call      Trisha Velázquez, RN, BSN  Urology Patient Care Supervisor

## 2018-04-23 ENCOUNTER — TELEPHONE (OUTPATIENT)
Dept: UROLOGY | Facility: CLINIC | Age: 68
End: 2018-04-23

## 2018-04-23 NOTE — TELEPHONE ENCOUNTER
[facility] Call Center    Phone Message    May a detailed message be left on voicemail: yes    Reason for Call: Other: Please call Kelley or the PT with results of the MRI      Action Taken: Message routed to:  [facility] ([facility]): .

## 2018-06-11 ENCOUNTER — PRE VISIT (OUTPATIENT)
Dept: UROLOGY | Facility: CLINIC | Age: 68
End: 2018-06-11

## 2018-06-11 DIAGNOSIS — C61 PROSTATE CANCER (H): Primary | ICD-10-CM

## 2018-06-11 NOTE — TELEPHONE ENCOUNTER
Prostate cancer follow up- discuss results of MRI.  Records available in [facility].

## 2018-06-13 DIAGNOSIS — C61 PROSTATE CANCER (H): ICD-10-CM

## 2018-06-13 PROCEDURE — 84153 ASSAY OF PSA TOTAL: CPT | Performed by: UROLOGY

## 2018-06-13 PROCEDURE — 36415 COLL VENOUS BLD VENIPUNCTURE: CPT | Performed by: UROLOGY

## 2018-06-14 ENCOUNTER — OFFICE VISIT (OUTPATIENT)
Dept: UROLOGY | Facility: CLINIC | Age: 68
End: 2018-06-14
Payer: COMMERCIAL

## 2018-06-14 VITALS
SYSTOLIC BLOOD PRESSURE: 111 MMHG | WEIGHT: 167.5 LBS | HEIGHT: 69 IN | BODY MASS INDEX: 24.81 KG/M2 | HEART RATE: 65 BPM | DIASTOLIC BLOOD PRESSURE: 84 MMHG

## 2018-06-14 DIAGNOSIS — C61 PROSTATE CANCER (H): Primary | ICD-10-CM

## 2018-06-14 LAB — PSA SERPL-MCNC: 11.4 UG/L (ref 0–4)

## 2018-06-14 ASSESSMENT — PAIN SCALES - GENERAL: PAINLEVEL: NO PAIN (0)

## 2018-06-14 NOTE — PROGRESS NOTES
Prostate Cancer Follow Up on AS     Andre Alas is a very pleasant 67 year old male who presents with a history of Prostate Cancer.   The clinical stage is cT1c, initial PSA was 10.7 and biopsy acacia score is 3+3 in 5 of 12 cores.  Volume of core(s) affected by prostate cancer ranged from 5% to 7%.  JAMIE by Dr. Guadarrama on 12/5/17 was normal.  The prostate size was previously measured and estimated to be to be 60 grams.  PSA density was 0.18.    He had an MRI of the prostate on 3/31/18 which measured prostate volume as 90 cc. The re-calculated PSA density (using PSA of 11.9 from 1/12/2018) is 0.13. PSA from today is pending.     Today he notes he continues to have issues with urinary retention. AUASI today is 28/35 with QOL 3/6, which is worse from previous AUASI of 17 with QOL 2 from 12/2017. He is not taking any medications for this.         Labs and Pathology:    I reviewed all applicable laboratory and pathology data and went over findings with patient  Significant for:    PSA   Date Value Ref Range Status   06/13/2018 11.40 (H) 0 - 4 ug/L Final     Comment:     Assay Method:  Chemiluminescence using Siemens Vista analyzer   01/12/2018 11.90 (H) 0 - 4 ug/L Final     Comment:     Assay Method:  Chemiluminescence using Siemens Vista analyzer   12/05/2017 10.70 (H) 0 - 4 ug/L Final     Comment:     Assay Method:  Chemiluminescence using Siemens Vista analyzer           Imaging:    I reviewed all applicable imaging and went over findings with patient.    Significant for MRI Prostate 3/31/18:    FINDINGS:  Prostate gland size: 4.5 x 5.8 x 6.7 cm  Volume: 90.0 cc     Scattered areas of precontrast hyperintensity presumably representing  blood products relating to prior biopsy.     Peripheral zone: Diffuse compression of the peripheral zone by BPH  type changes in the transitional zone. Diffuse T2 hypointense  linearity and stranding throughout the peripheral zone suggestive of  inflammation. Focal area of T1  hyperintensity/T2 hypointensity in the  right peripheral zone (series 13 image 23) and thin curvilinear  subcapsular T1 hyperintensity on the left (series 13 image 23), most  consistent with hemorrhage from prior prostate biopsy on 2/2/2018.     In the left posterior mid gland from the 4:00 to 6:00 position there  is an irregular shaped area of moderate ADC hypointensity measuring  2.6 x 1.4 cm (series 9 image 16; ADC equals a ) at the junction  of the peripheral and transitional zones with isointense/minimally  hyperintense signal on high B value DWI images and without suspicious  contrast enhancement. There is irregular T2 signal hypointensity in  this area (series 6 image 17). No extracapsular extension.     PI-RADS:  Peripheral zone T2: 4  Diffusion-weighted image: 3    Contrast-enhanced images: Negative       Overall assessment: 3     Similarly, in the right posterior mid gland at the 7:00 position there  is a 1.1 x 2.3 cm area of hypointense T2 signal and minimal ADC  hypointensity (series 9 image 16; ADC equals 1050) without  corresponding high B value DWI signal abnormality or suspicious  enhancement. No extracapsular extension.     PI-RADS:  Peripheral zone T2: 3  Diffusion-weighted image: 2    Contrast-enhanced images: Negative       Overall assessment: 2     Transitional zone: There are BPH type changes throughout the  transitional zone.     PI-RADS:  Transition zone T2: 2  Diffusion-weighted image: 3  Contrast-enhanced images: Negative       Overall assessment: 2     Remainder of the pelvis:  No suspicious pelvic lymphadenopathy. The  distal ureters are nondilated. Mild urinary bladder wall  trabeculation. No pelvic free fluid. Visualized bowel is normal. No  suspicious bone marrow signal.         IMPRESSION:   1. Based on the most suspicious abnormality, this exam is  characterized as PIRADS 3 - Intermediate probability.  The most  suspicious abnormality is located in the posterior left  "peripheral  zone at the level of the base. It is possible possible that this could  represent a central zone asymmetry.  2. No suspicious adenopathy or evidence of pelvic metastases.  3. Diffuse T2 hypointense stranding throughout the peripheral zone  with areas of precontrast T1 hyperintensity consistent with  prostatitis and hemorrhage following recent prostate biopsy.      Outside and Past Medical records:    I spent 10 minutes reviewing outside and past medical records.     Standardized Questionnaire:      Not completed today.          Assessment and Plan:     Assessment:   Low risk Localized Prostate Cancer, [place] 6 with severe symptoms from BPH    Plan:    - We once again discussed continuing on active surveillance given the low-risk nature of his prostate cancer. We are additionally reassured by the results of his recent prostate MRI as well as the decrease in PSA density. 0.13  - Discussed genetic testing (Oncotype), however discussed that this is elective and not necessarily indicated at this time given his small volume disease  - We also discussed his current voiding symptoms and that these are likely due to large-gland BPH. We reviewed medical treatment options for this and the associated risks, benefits, and side effects. We also briefly discussed surgical treatment options and patient was interested in a consult with Dr. Oliveros to discuss these options in greater detail including a HoLep Notes he is \"not a medication sarah.\" and prefers surgical treatment.  He has not tried any meds to this point, but likely they would not be sufficient.      Scribe Disclosure:   I,Joseph Hogan, am serving as a scribe; to document services personally performed by Abelino Méndez MD based on data collection and the provider's statements to me.     Abeilno Méndez MD  Department of Urology Staff  [facility]    Attestation:  This patient was seen and evaluated by me, with a scribe taking " "notes.  I have reviewed the note above and agree.  The physical exam and or any procedures were performed by me and the pertinant details are outlined below.            Physical Exam:     Patient is a 67 year old  male   Vitals: /84  Pulse 65  Ht 1.753 m (5' 9\")  Wt 76 kg (167 lb 8 oz)  BMI 24.74 kg/m2  General Appearance Adult: Alert, no acute distress, oriented      Assessment/Plan: low risk prostate cancer with BPH    AS for PC  Referral to Dr. Oliveros for surgical treatment of enlarged prostate    Abelino Méndez MD  Department of Urology  [facility]      CC  Patient Care Team:  Marybeth Gaffney MD as PCP - General (Family Practice)  DON CONDE    Copy to patient  GABINO CLANCY  [address] [place] [facility] [address]          "

## 2018-06-14 NOTE — LETTER
6/14/2018     RE: Andre Alas  [address]  [place] [facility] [address]     Dear Colleague,    Thank you for referring your patient, Andre Alas, to the [facility] UROLOGY AND INST FOR PROSTATE AND UROLOGIC CANCERS at [facility]. Please see a copy of my visit note below.    Prostate Cancer Follow Up on AS     Andre Alas is a very pleasant 67 year old male who presents with a history of Prostate Cancer.   The clinical stage is cT1c, initial PSA was 10.7 and biopsy acacia score is 3+3 in 5 of 12 cores.  Volume of core(s) affected by prostate cancer ranged from 5% to 7%.  JAMIE by Dr. Guadarrama on 12/5/17 was normal.  The prostate size was previously measured and estimated to be to be 60 grams.  PSA density was 0.18.    He had an MRI of the prostate on 3/31/18 which measured prostate volume as 90 cc. The re-calculated PSA density (using PSA of 11.9 from 1/12/2018) is 0.13. PSA from today is pending.     Today he notes he continues to have issues with urinary retention. AUASI today is 28/35 with QOL 3/6, which is worse from previous AUASI of 17 with QOL 2 from 12/2017. He is not taking any medications for this.         Labs and Pathology:    I reviewed all applicable laboratory and pathology data and went over findings with patient  Significant for:    PSA   Date Value Ref Range Status   06/13/2018 11.40 (H) 0 - 4 ug/L Final     Comment:     Assay Method:  Chemiluminescence using Siemens Vista analyzer   01/12/2018 11.90 (H) 0 - 4 ug/L Final     Comment:     Assay Method:  Chemiluminescence using Siemens Vista analyzer   12/05/2017 10.70 (H) 0 - 4 ug/L Final     Comment:     Assay Method:  Chemiluminescence using Siemens Vista analyzer           Imaging:    I reviewed all applicable imaging and went over findings with patient.    Significant for MRI Prostate 3/31/18:    FINDINGS:  Prostate gland size: 4.5 x 5.8 x 6.7 cm  Volume: 90.0 cc     Scattered areas of precontrast  hyperintensity presumably representing  blood products relating to prior biopsy.     Peripheral zone: Diffuse compression of the peripheral zone by BPH  type changes in the transitional zone. Diffuse T2 hypointense  linearity and stranding throughout the peripheral zone suggestive of  inflammation. Focal area of T1 hyperintensity/T2 hypointensity in the  right peripheral zone (series 13 image 23) and thin curvilinear  subcapsular T1 hyperintensity on the left (series 13 image 23), most  consistent with hemorrhage from prior prostate biopsy on 2/2/2018.     In the left posterior mid gland from the 4:00 to 6:00 position there  is an irregular shaped area of moderate ADC hypointensity measuring  2.6 x 1.4 cm (series 9 image 16; ADC equals a ) at the junction  of the peripheral and transitional zones with isointense/minimally  hyperintense signal on high B value DWI images and without suspicious  contrast enhancement. There is irregular T2 signal hypointensity in  this area (series 6 image 17). No extracapsular extension.     PI-RADS:  Peripheral zone T2: 4  Diffusion-weighted image: 3    Contrast-enhanced images: Negative       Overall assessment: 3     Similarly, in the right posterior mid gland at the 7:00 position there  is a 1.1 x 2.3 cm area of hypointense T2 signal and minimal ADC  hypointensity (series 9 image 16; ADC equals 1050) without  corresponding high B value DWI signal abnormality or suspicious  enhancement. No extracapsular extension.     PI-RADS:  Peripheral zone T2: 3  Diffusion-weighted image: 2    Contrast-enhanced images: Negative       Overall assessment: 2     Transitional zone: There are BPH type changes throughout the  transitional zone.     PI-RADS:  Transition zone T2: 2  Diffusion-weighted image: 3  Contrast-enhanced images: Negative       Overall assessment: 2     Remainder of the pelvis:  No suspicious pelvic lymphadenopathy. The  distal ureters are nondilated. Mild urinary bladder  "wall  trabeculation. No pelvic free fluid. Visualized bowel is normal. No  suspicious bone marrow signal.         IMPRESSION:   1. Based on the most suspicious abnormality, this exam is  characterized as PIRADS 3 - Intermediate probability.  The most  suspicious abnormality is located in the posterior left peripheral  zone at the level of the base. It is possible possible that this could  represent a central zone asymmetry.  2. No suspicious adenopathy or evidence of pelvic metastases.  3. Diffuse T2 hypointense stranding throughout the peripheral zone  with areas of precontrast T1 hyperintensity consistent with  prostatitis and hemorrhage following recent prostate biopsy.      Outside and Past Medical records:    I spent 10 minutes reviewing outside and past medical records.     Standardized Questionnaire:      Not completed today.          Assessment and Plan:     Assessment:   Low risk Localized Prostate Cancer, [place] 6 with severe symptoms from BPH    Plan:    - We once again discussed continuing on active surveillance given the low-risk nature of his prostate cancer. We are additionally reassured by the results of his recent prostate MRI as well as the decrease in PSA density. 0.13  - Discussed genetic testing (Oncotype), however discussed that this is elective and not necessarily indicated at this time given his small volume disease  - We also discussed his current voiding symptoms and that these are likely due to large-gland BPH. We reviewed medical treatment options for this and the associated risks, benefits, and side effects. We also briefly discussed surgical treatment options and patient was interested in a consult with Dr. Oliveros to discuss these options in greater detail including a HoLep Notes he is \"not a medication sarah.\" and prefers surgical treatment.  He has not tried any meds to this point, but likely they would not be sufficient.      Scribe Disclosure:   I,Joseph Hogan, am serving as " "a scribe; to document services personally performed by Abelino Méndez MD based on data collection and the provider's statements to me.     Abelino Méndez MD  Department of Urology [facility]    Attestation:  This patient was seen and evaluated by me, with a scribe taking notes.  I have reviewed the note above and agree.  The physical exam and or any procedures were performed by me and the pertinant details are outlined below.            Physical Exam:     Patient is a 67 year old  male   Vitals: /84  Pulse 65  Ht 1.753 m (5' 9\")  Wt 76 kg (167 lb 8 oz)  BMI 24.74 kg/m2  General Appearance Adult: Alert, no acute distress, oriented      Assessment/Plan: low risk prostate cancer with BPH    AS for PC  Referral to Dr. Oliveros for surgical treatment of enlarged prostate    Abelino Méndez MD  Department of Urology  [facility]      CC  Patient Care Team:  Marybeth Gaffney MD as PCP - General (Family Practice)  DON CONDE    Copy to patient  GABINO ESCUDERO HARMONYNONI  [address] [facility] [address]    "

## 2018-06-14 NOTE — MR AVS SNAPSHOT
After Visit Summary   6/14/2018    Andre Alas    MRN: [id number]           Patient Information     Date Of Birth          1950        Visit Information        Provider Department      6/14/2018 7:40 AM Weight, Abelino Hanson MD [facility] Urology and [facility] for Prostate and Urologic Cancers        Care Instructions    Schedule appointment with Dr. Oliveros to discuss surgical options.    It was a pleasure meeting with you today.  Thank you for allowing me and my team the privilege of caring for you today.  YOU are the reason we are here, and I truly hope we provided you with the excellent service you deserve.  Please let us know if there is anything else we can do for you so that we can be sure you are leaving completely satisfied with your care experience.        Cassy Millan WANDA          Follow-ups after your visit        Your next 10 appointments already scheduled     Jul 10, 2018  7:40 AM CDT   (Arrive by 7:25 AM)   Return Visit with Henry Oliveros MD   [facility] Urology and [facility] for Prostate and Urologic Cancers ([facility])    [address] [place] [id number] [phone]              Who to contact     Please call your clinic at [phone] to:    Ask questions about your health    Make or cancel appointments    Discuss your medicines    Learn about your test results    Speak to your doctor            Additional Information About Your Visit        MyChart Information     MYTRND is an electronic gateway that provides easy, online access to your medical records. With MYTRND, you can request a clinic appointment, read your test results, renew a prescription or communicate with your care team.     To sign up for [email or web address] visit the website at [email or web address]   You will be asked to enter the access code listed below, as well as some personal information. Please follow the directions to create your username and  "password.     Your access code is: [email or web address]  Expires: 2018  6:30 AM     Your access code will  in 90 days. If you need help or a new code, please contact your [facility] or call [phone] for assistance.        Care EveryWhere ID     This is your Care EveryWhere ID. This could be used by other organizations to access your [place] medical records  [id number]        Your Vitals Were     Pulse Height BMI (Body Mass Index)             65 1.753 m (5' 9\") 24.74 kg/m2          Blood Pressure from Last 3 Encounters:   18 111/84   18 154/90   18 141/90    Weight from Last 3 Encounters:   18 76 kg (167 lb 8 oz)   18 77.1 kg (170 lb)   18 76.2 kg (168 lb)              Today, you had the following     No orders found for display         Today's Medication Changes          These changes are accurate as of 18  8:45 AM.  If you have any questions, ask your nurse or doctor.               Stop taking these medicines if you haven't already. Please contact your care team if you have questions.     cephALEXin 500 MG capsule   Commonly known as:  KEFLEX   Stopped by:  Abelino Méndez MD           ciprofloxacin 500 MG tablet   Commonly known as:  CIPRO   Stopped by:  Abelino Méndez MD                    Primary Care Provider Office Phone # Fax #    Marybeth Gaffney [phone] [address] [facility] [address]        Equal Access to Services     ROBERT HINOJOSA AH: Hadii shamar milton hadasho Sonancyali, waaxda luqadaha, qaybta kaalmada adeegyada, clara cavazos. So [facility] [phone].    ATENCIÓN: Si habla español, tiene a espino disposición servicios gratuitos de asistencia lingüística. Llame al [phone].    We comply with applicable federal civil rights laws and [place] laws. We do not discriminate on the basis of race, color, national origin, age, disability, sex, sexual " orientation, or gender identity.            Thank you!     Thank you for choosing [facility] UROLOGY AND [facility] FOR PROSTATE AND UROLOGIC CANCERS  for your care. Our goal is always to provide you with excellent care. Hearing back from our patients is one way we can continue to improve our services. Please take a few minutes to complete the written survey that you may receive in the mail after your visit with us. Thank you!             Your Updated Medication List - Protect others around you: Learn how to safely use, store and throw away your medicines at www.disposemymeds.org.          This list is accurate as of 6/14/18  8:45 AM.  Always use your most recent med list.                   Brand Name Dispense Instructions for use Diagnosis    ASPIRIN NOT PRESCRIBED    INTENTIONAL    0 each    Please choose reason not prescribed, below    Old myocardial infarction       mupirocin 2 % ointment    BACTROBAN          * XARELTO 15 MG Tabs tablet   Generic drug:  rivaroxaban ANTICOAGULANT           * rivaroxaban ANTICOAGULANT 20 MG Tabs tablet    XARELTO     Take 15 mg by mouth daily (with dinner)    PAF (paroxysmal atrial fibrillation) (H)       VITAMIN B COMPLEX PO      Take 2 tablets by mouth. Every 2 weeks        VITAMIN C PO      Take 3 tablets by mouth. Every 2 weeks        vitamin E 200 UNIT capsule      Take 2 capsules by mouth. Every 2 weeks        Zinc Gluconate 100 MG Tabs      Take 2 tablets by mouth. Every 2 weeks        * Notice:  This list has 2 medication(s) that are the same as other medications prescribed for you. Read the directions carefully, and ask your doctor or other care provider to review them with you.

## 2018-06-14 NOTE — NURSING NOTE
Chief Complaint   Patient presents with     RECHECK     Prostate cancer follow up- discuss results of MRI.      OVIDIO Boswell

## 2018-06-15 ENCOUNTER — PRE VISIT (OUTPATIENT)
Dept: UROLOGY | Facility: CLINIC | Age: 68
End: 2018-06-15

## 2018-07-10 ENCOUNTER — OFFICE VISIT (OUTPATIENT)
Dept: UROLOGY | Facility: CLINIC | Age: 68
End: 2018-07-10
Payer: COMMERCIAL

## 2018-07-10 VITALS
HEART RATE: 58 BPM | SYSTOLIC BLOOD PRESSURE: 136 MMHG | BODY MASS INDEX: 24.79 KG/M2 | DIASTOLIC BLOOD PRESSURE: 78 MMHG | WEIGHT: 167.4 LBS | HEIGHT: 69 IN

## 2018-07-10 DIAGNOSIS — C61 PROSTATE CANCER (H): Primary | ICD-10-CM

## 2018-07-10 LAB
ALBUMIN UR-MCNC: NEGATIVE MG/DL
APPEARANCE UR: CLEAR
BILIRUB UR QL STRIP: NEGATIVE
COLOR UR AUTO: YELLOW
GLUCOSE UR STRIP-MCNC: NEGATIVE MG/DL
HGB UR QL STRIP: NEGATIVE
KETONES UR STRIP-MCNC: NEGATIVE MG/DL
LEUKOCYTE ESTERASE UR QL STRIP: ABNORMAL
NITRATE UR QL: NEGATIVE
PH UR STRIP: 5 PH (ref 5–7)
RBC #/AREA URNS AUTO: 0 /HPF (ref 0–2)
SOURCE: ABNORMAL
SP GR UR STRIP: 1.01 (ref 1–1.03)
SQUAMOUS #/AREA URNS AUTO: <1 /HPF (ref 0–1)
UROBILINOGEN UR STRIP-MCNC: 0 MG/DL (ref 0–2)
WBC #/AREA URNS AUTO: 2 /HPF (ref 0–5)

## 2018-07-10 ASSESSMENT — PATIENT HEALTH QUESTIONNAIRE - PHQ9
SUM OF ALL RESPONSES TO PHQ QUESTIONS 1-9: 7
SUM OF ALL RESPONSES TO PHQ QUESTIONS 1-9: 7
10. IF YOU CHECKED OFF ANY PROBLEMS, HOW DIFFICULT HAVE THESE PROBLEMS MADE IT FOR YOU TO DO YOUR WORK, TAKE CARE OF THINGS AT HOME, OR GET ALONG WITH OTHER PEOPLE: SOMEWHAT DIFFICULT

## 2018-07-10 ASSESSMENT — PAIN SCALES - GENERAL: PAINLEVEL: NO PAIN (0)

## 2018-07-10 NOTE — LETTER
7/10/2018        RE: Andre Alas  [address]  [facility] [address]     Dear Colleague,    Thank you for referring your patient, Andre Alas, to the [facility] UROLOGY AND INST FOR PROSTATE AND UROLOGIC CANCERS at [facility]. Please see a copy of my visit note below.            Chief Complaint:   G[place] 6 Prostate Cancer, LUTS         History of Present Illness:    Andre Alas is a very pleasant 67 year old male who presents with a history of CVA, HLD, HTN, low risk localized prostate cancer ([place] 6), and BPH/LUTS.    His prostate cancer was initially discovered in the setting of and episode of gross hematuria. He had a cystoscopy in January 2018 which showed moderate trabeculations and trilobar hypertrophy. He was subsequently found to have an elevated PSA which prompted a TRUS biopsy that revealed 5/12 cores of acacia 6 prostate cancer.  He has since established care with Dr. Méndez for his prostate cancer. He was seen recently where a prostate MRI revealed a PiRADS 3 lesion but a large size over 90 gm of his prostate.  His PSA density based on this was deemed low and he was sent for discussion of possible outlet surgery having preferred to take an active surveilance approach to his prostate cancer.     Today he reports that his urinary symptoms include frequency, urgency, incomplete emptying. He states they are causing significant bother in his life. He is not currently on Flomax. He has never taken any medication for his prostate.  He is not interested in medications whatsoever.    Takes Xarelto for (hx of paroxysmal atrial fibrillation and patent formamen ovale), notes he is able to come off of it and has come off of it before.          Past Medical History:     Past Medical History:   Diagnosis Date     Allergic rhinitis due to dust 11/29/2011     BPH without urinary obstruction 11/29/2011     CVA (cerebral vascular accident) (H) 07/2014    embolic  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     Hyperlipidemia LDL goal <70 11/28/2011     Hypertension goal BP (blood pressure) < 140/90      NSTEMI (non-ST elevated myocardial infarction) (H) 7/2014    prob sec to PAF -- no sig CAD     PAF (paroxysmal atrial fibrillation) (H) 7/2014     Prostate cancer (H) 02/2018            Past Surgical History:     Past Surgical History:   Procedure Laterality Date     ANGIOGRAM  2014    normal - non ST MI     DENTAL SURGERY  2000     HERNIA REPAIR, INGUINAL RT/LT  1952            Medications     Current Outpatient Prescriptions   Medication     Ascorbic Acid (VITAMIN C PO)     ASPIRIN NOT PRESCRIBED (INTENTIONAL)     B Complex Vitamins (VITAMIN B COMPLEX PO)     mupirocin (BACTROBAN) 2 % ointment     rivaroxaban ANTICOAGULANT (XARELTO) 20 MG TABS tablet     vitamin E 200 UNIT capsule     XARELTO 15 MG TABS tablet     Zinc Gluconate (ZINC) 100 MG TABS     No current facility-administered medications for this visit.             Family History:     Family History   Problem Relation Age of Onset     Heart Failure Mother 73     Cerebrovascular Disease Father 70     HEART DISEASE Father 91     Pacemaker     KIDNEY DISEASE Father      stones      Cancer - colorectal No family hx of      Prostate Cancer No family hx of      Diabetes No family hx of      Hypertension No family hx of             Social History:     Social History     Social History     Marital status:      Spouse name: Ludmila     Number of children: 2     Years of education: N/A     Occupational History      [employer]     Social History Main Topics     Smoking status: Never Smoker     Smokeless tobacco: Never Used     Alcohol use No     Drug use: No     Sexual activity: Yes     Partners: Female     Birth control/ protection: Condom     Other Topics Concern     Parent/Sibling W/ Cabg, Mi Or Angioplasty Before 65f 55m? No     Social History Narrative            Allergies:   Dust mites and Mold         Review of  "Systems:  From intake questionnaire   Negative 14 system review except as noted on HPI, nurse's note.         Physical Exam:   Patient is a 67 year old  male   Vitals: Blood pressure 136/78, pulse 58, height 1.753 m (5' 9\"), weight 75.9 kg (167 lb 6.4 oz).  General Appearance Adult: Alert, no acute distress, oriented  HENT: throat/mouth:normal, good dentition  Neck: No adenopathy,masses or thyromegaly  Lungs: no respiratory distress, or pursed lip breathing  Heart: No obvious jugular venous distension present  Abdomen: soft, nontender, no organomegaly or masses, Body mass index is 24.72 kg/(m^2).  Lymphatics: No cervical or supraclavicular adenopathy  Musculoskeltal: extremities normal, no peripheral edema  Skin: no suspicious lesions or rashes  Neuro: Alert, oriented, speech and mentation normal  Psych: affect and mood normal  Gait: Normal  : Deferred for cystoscopy     Uroflow and Post-Void Residual by Bladder Scan     PVR: 237 mL      Labs and Pathology:    I personally reviewed all applicable laboratory data and went over findings with patient  Significant for:    CBC RESULTS:  Recent Labs   Lab Test  11/29/11   0857   WBC  5.1   HGB  15.8   PLT  173        BMP RESULTS:  Recent Labs   Lab Test 07/13/14 07/12/14 11/29/11   0857   NA   --    --   142   POTASSIUM  4.0   --   4.9   CHLORIDE   --    --   102   CO2   --    --   26   ANIONGAP   --    --   14   GLC   --    --   99   BUN   --    --   17   CR   --   0.95  1.06   GFRESTIMATED   --   >60  71   GFRESTBLACK   --   >60  86   NUNU   --    --   9.9       UA RESULTS:   Recent Labs   Lab Test  12/05/17   1113  11/20/17   0924  11/29/11   0910   SG  <=1.005  <=1.005  1.015   URINEPH  6.0  6.0  5.5   NITRITE  Negative  Negative  Negative       CALCIUM RESULTS  Lab Results   Component Value Date    NUNU 9.9 11/29/2011       PSA RESULTS  PSA   Date Value Ref Range Status   06/13/2018 11.40 (H) 0 - 4 ug/L Final     Comment:     Assay Method:  Chemiluminescence using " Siemens Vista analyzer   01/12/2018 11.90 (H) 0 - 4 ug/L Final     Comment:     Assay Method:  Chemiluminescence using Siemens Vista analyzer   12/05/2017 10.70 (H) 0 - 4 ug/L Final     Comment:     Assay Method:  Chemiluminescence using Siemens Vista analyzer           Imaging:    I personally reviewed all applicable imaging and went over findings with patient.  Significant for:    Results for orders placed or performed in visit on 03/31/18   MRI Prostate    Narrative    MRI PROSTATE:  3/31/2018 8:03 AM    CLINICAL HISTORY: .; Prostate cancer (H), prostate biopsy 2/2/2018  revealed Aleksandar 3+3 = 6 prostatic adenocarcinoma    Comparison: CT abdomen/pelvis 11/22/2017.    TECHNIQUE:     The following sequences were obtained: High-resolution axial  T2-weighted, coronal T2-weighted, 3D volumetric T2-weighted, axial  pre-contrast T1, axial diffusion-weighted, axial apparent diffusion  coefficient and axial dynamic contrast-enhanced T1. Postcontrast  images were evaluated on a separate workstation to evaluate dynamic  contrast enhancement.  Contrast dose: 7.5cc's Gadavist    The images are interpreted according to PI-RADS v.2    FINDINGS:  Prostate gland size: 4.5 x 5.8 x 6.7 cm  Volume: 90.0 cc    Scattered areas of precontrast hyperintensity presumably representing  blood products relating to prior biopsy.    Peripheral zone: Diffuse compression of the peripheral zone by BPH  type changes in the transitional zone. Diffuse T2 hypointense  linearity and stranding throughout the peripheral zone suggestive of  inflammation. Focal area of T1 hyperintensity/T2 hypointensity in the  right peripheral zone (series 13 image 23) and thin curvilinear  subcapsular T1 hyperintensity on the left (series 13 image 23), most  consistent with hemorrhage from prior prostate biopsy on 2/2/2018.    In the left posterior mid gland from the 4:00 to 6:00 position there  is an irregular shaped area of moderate ADC hypointensity measuring  2.6 x  1.4 cm (series 9 image 16; ADC equals a ) at the junction  of the peripheral and transitional zones with isointense/minimally  hyperintense signal on high B value DWI images and without suspicious  contrast enhancement. There is irregular T2 signal hypointensity in  this area (series 6 image 17). No extracapsular extension.    PI-RADS:  Peripheral zone T2: 4  Diffusion-weighted image: 3    Contrast-enhanced images: Negative      Overall assessment: 3    Similarly, in the right posterior mid gland at the 7:00 position there  is a 1.1 x 2.3 cm area of hypointense T2 signal and minimal ADC  hypointensity (series 9 image 16; ADC equals 1050) without  corresponding high B value DWI signal abnormality or suspicious  enhancement. No extracapsular extension.    PI-RADS:  Peripheral zone T2: 3  Diffusion-weighted image: 2    Contrast-enhanced images: Negative      Overall assessment: 2    Transitional zone: There are BPH type changes throughout the  transitional zone.    PI-RADS:  Transition zone T2: 2  Diffusion-weighted image: 3  Contrast-enhanced images: Negative      Overall assessment: 2    Remainder of the pelvis:  No suspicious pelvic lymphadenopathy. The  distal ureters are nondilated. Mild urinary bladder wall  trabeculation. No pelvic free fluid. Visualized bowel is normal. No  suspicious bone marrow signal.      Impression    IMPRESSION:   1. Based on the most suspicious abnormality, this exam is  characterized as PIRADS 3 - Intermediate probability.  The most  suspicious abnormality is located in the posterior left peripheral  zone at the level of the base. It is possible possible that this could  represent a central zone asymmetry.  2. No suspicious adenopathy or evidence of pelvic metastases.  3. Diffuse T2 hypointense stranding throughout the peripheral zone  with areas of precontrast T1 hyperintensity consistent with  prostatitis and hemorrhage following recent prostate biopsy.    The images are  interpreted according to PI-RADS v2.  http://[email or web address].[email or web address]  [email or web address]    I have personally reviewed the examination and initial interpretation  and I agree with the findings.    PAULA KAYE MD          Standardized Questionnaire:      [employer] SYMPTOM SCORE  SUM 28/35  QOL 4/6           Assessment and Plan:     Assessment: 67 year old male w/ hx of moderate volume [place] 6 prostate cancer severe LUTS    Plan:  -We discussed that this is a highly complex situation with numerous different treatment strategies.  He prefers to avoid radical prostatectomy if possible but is aware that this is a potentially attractive treatment option from the perspective that it would be most definitive in terms of his cancer and urinary symptoms.   -We also discussed a possible mini or full HoLEP.  He has a substantial median lobe on imaging and imagine he could get quite a bit of symptom relief if we were to mainly remove the median lobe.  A bigger resection could potentially make subsequent prostatectomy more challenging with delayed functional imrpovement if such treatment were deemed necessary.  Given young age and moderate volume disease this is something to consider.  -He would like some time to consider these options.  We ultimately planned on coordinating a cystoscopy to assess whether a median lobe only approach might be warranted.  If decided he prefers prostatectomy will reschedule with Dr. Méndez to make arrangements.       Scribe Disclosure:   I,Joseph Hogan, am serving as a scribe; to document services personally performed by Henry Oliveros MD based on data collection and the provider's statements to me.     I, Henry Oliveros saw and evaluated this patient and agree with the plan as stated above.  I personally performed all listed procedures.     > 25 minutes were spent with patient over 50% of which was spent face to  face discussing urinary symptoms, medical treatment options and surgical treatment options for LUTS as well as prostate cancer treatment options     CC:  Marybeth Gaffney Christopher        Again, thank you for allowing me to participate in the care of your patient.      Sincerely,    Henry Oliveros MD

## 2018-07-10 NOTE — MR AVS SNAPSHOT
"              After Visit Summary   7/10/2018    Andre Alas    MRN: [id number]           Patient Information     Date Of Birth          1950        Visit Information        Provider Department      7/10/2018 7:40 AM Henry Oliveros MD [facility] Urology and [facility] for Prostate and Urologic Cancers        Today's Diagnoses     Prostate cancer (H)    -  1       Follow-ups after your visit        Your next 10 appointments already scheduled     2018 10:00 AM CDT   (Arrive by 9:45 AM)   Cystoscopy with Henry Oliveros MD   [facility] Urology and [facility] for Prostate and Urologic Cancers ([facility] [place])    [address]  [address] [facility] [address] [phone]              Who to contact     Please call your clinic at [phone] to:    Ask questions about your health    Make or cancel appointments    Discuss your medicines    Learn about your test results    Speak to your doctor            Additional Information About Your Visit        MyChart Information     Win[employer] is an electronic gateway that provides easy, online access to your medical records. With Win[employer], you can request a clinic appointment, read your test results, renew a prescription or communicate with your care team.     To sign up for [employer] visit the website at [email or web address]   You will be asked to enter the access code listed below, as well as some personal information. Please follow the directions to create your username and password.     Your access code is: [email or web address]  Expires: 2018  6:30 AM     Your access code will  in 90 days. If you need help or a new code, please contact your [facility] or call [phone] for assistance.        Your Vitals Were     Pulse Height BMI (Body Mass Index)             58 1.753 m (5' 9\") 24.72 kg/m2          Blood Pressure from Last 3 Encounters:   07/10/18 136/78   18 111/84   18 154/90    " Weight from Last 3 Encounters:   07/10/18 75.9 kg (167 lb 6.4 oz)   06/14/18 76 kg (167 lb 8 oz)   03/22/18 77.1 kg (170 lb)              We Performed the Following     POST-VOID RESIDUAL BLADDER SCAN     Routine UA with micro reflex to culture        Primary Care Provider Office Phone # Fax #    Marybeth Gaffney [phone]       [address]  [place] [facility] [address]        Equal Access to Services     ROBERT HINOJOSA : Hadii aad ku hadasho Soomaali, waaxda luqadaha, qaybta kaalmada adeegyada, waxay idiin hayaan adeeg kharash la'davida . So [facility] [phone].    ATENCIÓN: Si habla español, tiene a espino disposición servicios gratuitos de asistencia lingüística. [facility] [phone].    We comply with applicable federal civil rights laws and [place] laws. We do not discriminate on the basis of race, color, national origin, age, disability, sex, sexual orientation, or gender identity.            Thank you!     Thank you for choosing [facility] UROLOGY AND [facility] FOR PROSTATE AND UROLOGIC CANCERS  for your care. Our goal is always to provide you with excellent care. Hearing back from our patients is one way we can continue to improve our services. Please take a few minutes to complete the written survey that you may receive in the mail after your visit with us. Thank you!             Your Updated Medication List - Protect others around you: Learn how to safely use, store and throw away your medicines at www.disposemymeds.org.          This list is accurate as of 7/10/18  9:12 AM.  Always use your most recent med list.                   Brand Name Dispense Instructions for use Diagnosis    ASPIRIN NOT PRESCRIBED    INTENTIONAL    0 each    Please choose reason not prescribed, below    Old myocardial infarction       mupirocin 2 % ointment    BACTROBAN          * XARELTO 15 MG Tabs tablet   Generic drug:  rivaroxaban ANTICOAGULANT           * rivaroxaban ANTICOAGULANT 20 MG Tabs tablet    XARELTO     Take 15  mg by mouth daily (with dinner)    PAF (paroxysmal atrial fibrillation) (H)       VITAMIN B COMPLEX PO      Take 2 tablets by mouth. Every 2 weeks        VITAMIN C PO      Take 3 tablets by mouth. Every 2 weeks        vitamin E 200 UNIT capsule      Take 2 capsules by mouth. Every 2 weeks        Zinc Gluconate 100 MG Tabs      Take 2 tablets by mouth. Every 2 weeks        * Notice:  This list has 2 medication(s) that are the same as other medications prescribed for you. Read the directions carefully, and ask your doctor or other care provider to review them with you.

## 2018-07-10 NOTE — PROGRESS NOTES
Chief Complaint:   [place] 6 Prostate Cancer, LUTS         History of Present Illness:    Andre Alas is a very pleasant 67 year old male who presents with a history of CVA, HLD, HTN, low risk localized prostate cancer ([place] 6), and BPH/LUTS.    His prostate cancer was initially discovered in the setting of and episode of gross hematuria. He had a cystoscopy in January 2018 which showed moderate trabeculations and trilobar hypertrophy. He was subsequently found to have an elevated PSA which prompted a TRUS biopsy that revealed 5/12 cores of acacia 6 prostate cancer.  He has since established care with Dr. Méndez for his prostate cancer. He was seen recently where a prostate MRI revealed a PiRADS 3 lesion but a large size over 90 gm of his prostate.  His PSA density based on this was deemed low and he was sent for discussion of possible outlet surgery having preferred to take an active surveilance approach to his prostate cancer.     Today he reports that his urinary symptoms include frequency, urgency, incomplete emptying. He states they are causing significant bother in his life. He is not currently on Flomax. He has never taken any medication for his prostate.  He is not interested in medications whatsoever.    Takes Xarelto for (hx of paroxysmal atrial fibrillation and patent formamen ovale), notes he is able to come off of it and has come off of it before.          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     Hyperlipidemia LDL goal <70 11/28/2011     Hypertension goal BP (blood pressure) < 140/90      NSTEMI (non-ST elevated myocardial infarction) (H) 7/2014    prob sec to PAF -- no sig CAD     PAF (paroxysmal atrial fibrillation) (H) 7/2014     Prostate cancer (H) 02/2018            Past Surgical History:  "    Past Surgical History:   Procedure Laterality Date     ANGIOGRAM  2014    normal - non ST MI     DENTAL SURGERY  2000     HERNIA REPAIR, INGUINAL RT/LT  1952            Medications     Current Outpatient Prescriptions   Medication     Ascorbic Acid (VITAMIN C PO)     ASPIRIN NOT PRESCRIBED (INTENTIONAL)     B Complex Vitamins (VITAMIN B COMPLEX PO)     mupirocin (BACTROBAN) 2 % ointment     rivaroxaban ANTICOAGULANT (XARELTO) 20 MG TABS tablet     vitamin E 200 UNIT capsule     XARELTO 15 MG TABS tablet     Zinc Gluconate (ZINC) 100 MG TABS     No current facility-administered medications for this visit.             Family History:     Family History   Problem Relation Age of Onset     Heart Failure Mother 73     Cerebrovascular Disease Father 70     HEART DISEASE Father 91     Pacemaker     KIDNEY DISEASE Father      stones      Cancer - colorectal No family hx of      Prostate Cancer No family hx of      Diabetes No family hx of      Hypertension No family hx of             Social History:     Social History     Social History     Marital status:      Spouse name: Ludmila     Number of children: 2     Years of education: N/A     Occupational History      [employer]     Social History Main Topics     Smoking status: Never Smoker     Smokeless tobacco: Never Used     Alcohol use No     Drug use: No     Sexual activity: Yes     Partners: Female     Birth control/ protection: Condom     Other Topics Concern     Parent/Sibling W/ Cabg, Mi Or Angioplasty Before 65f 55m? No     Social History Narrative            Allergies:   Dust mites and Mold         Review of Systems:  From intake questionnaire   Negative 14 system review except as noted on HPI, nurse's note.         Physical Exam:   Patient is a 67 year old  male   Vitals: Blood pressure 136/78, pulse 58, height 1.753 m (5' 9\"), weight 75.9 kg (167 lb 6.4 oz).  General Appearance Adult: Alert, no acute distress, oriented  HENT: " throat/mouth:normal, good dentition  Neck: No adenopathy,masses or thyromegaly  Lungs: no respiratory distress, or pursed lip breathing  Heart: No obvious jugular venous distension present  Abdomen: soft, nontender, no organomegaly or masses, Body mass index is 24.72 kg/(m^2).  Lymphatics: No cervical or supraclavicular adenopathy  Musculoskeltal: extremities normal, no peripheral edema  Skin: no suspicious lesions or rashes  Neuro: Alert, oriented, speech and mentation normal  Psych: affect and mood normal  Gait: Normal  : Deferred for cystoscopy     Uroflow and Post-Void Residual by Bladder Scan     PVR: 237 mL      Labs and Pathology:    I personally reviewed all applicable laboratory data and went over findings with patient  Significant for:    CBC RESULTS:  Recent Labs   Lab Test  11/29/11   0857   WBC  5.1   HGB  15.8   PLT  173        BMP RESULTS:  Recent Labs   Lab Test 07/13/14 07/12/14 11/29/11   0857   NA   --    --   142   POTASSIUM  4.0   --   4.9   CHLORIDE   --    --   102   CO2   --    --   26   ANIONGAP   --    --   14   GLC   --    --   99   BUN   --    --   17   CR   --   0.95  1.06   GFRESTIMATED   --   >60  71   GFRESTBLACK   --   >60  86   NUNU   --    --   9.9       UA RESULTS:   Recent Labs   Lab Test  12/05/17   1113  11/20/17   0924  11/29/11   0910   SG  <=1.005  <=1.005  1.015   URINEPH  6.0  6.0  5.5   NITRITE  Negative  Negative  Negative       CALCIUM RESULTS  Lab Results   Component Value Date    NUNU 9.9 11/29/2011       PSA RESULTS  PSA   Date Value Ref Range Status   06/13/2018 11.40 (H) 0 - 4 ug/L Final     Comment:     Assay Method:  Chemiluminescence using Siemens Vista analyzer   01/12/2018 11.90 (H) 0 - 4 ug/L Final     Comment:     Assay Method:  Chemiluminescence using Siemens Vista analyzer   12/05/2017 10.70 (H) 0 - 4 ug/L Final     Comment:     Assay Method:  Chemiluminescence using Siemens Vista analyzer           Imaging:    I personally reviewed all applicable imaging  and went over findings with patient.  Significant for:    Results for orders placed or performed in visit on 03/31/18   MRI Prostate    Narrative    MRI PROSTATE:  3/31/2018 8:03 AM    CLINICAL HISTORY: .; Prostate cancer (H), prostate biopsy 2/2/2018  revealed [place] 3+3 = 6 prostatic adenocarcinoma    Comparison: CT abdomen/pelvis 11/22/2017.    TECHNIQUE:     The following sequences were obtained: High-resolution axial  T2-weighted, coronal T2-weighted, 3D volumetric T2-weighted, axial  pre-contrast T1, axial diffusion-weighted, axial apparent diffusion  coefficient and axial dynamic contrast-enhanced T1. Postcontrast  images were evaluated on a separate workstation to evaluate dynamic  contrast enhancement.  Contrast dose: 7.5cc's Gadavist    The images are interpreted according to PI-RADS v.2    FINDINGS:  Prostate gland size: 4.5 x 5.8 x 6.7 cm  Volume: 90.0 cc    Scattered areas of precontrast hyperintensity presumably representing  blood products relating to prior biopsy.    Peripheral zone: Diffuse compression of the peripheral zone by BPH  type changes in the transitional zone. Diffuse T2 hypointense  linearity and stranding throughout the peripheral zone suggestive of  inflammation. Focal area of T1 hyperintensity/T2 hypointensity in the  right peripheral zone (series 13 image 23) and thin curvilinear  subcapsular T1 hyperintensity on the left (series 13 image 23), most  consistent with hemorrhage from prior prostate biopsy on 2/2/2018.    In the left posterior mid gland from the 4:00 to 6:00 position there  is an irregular shaped area of moderate ADC hypointensity measuring  2.6 x 1.4 cm (series 9 image 16; ADC equals a ) at the junction  of the peripheral and transitional zones with isointense/minimally  hyperintense signal on high B value DWI images and without suspicious  contrast enhancement. There is irregular T2 signal hypointensity in  this area (series 6 image 17). No extracapsular  extension.    PI-RADS:  Peripheral zone T2: 4  Diffusion-weighted image: 3    Contrast-enhanced images: Negative      Overall assessment: 3    Similarly, in the right posterior mid gland at the 7:00 position there  is a 1.1 x 2.3 cm area of hypointense T2 signal and minimal ADC  hypointensity (series 9 image 16; ADC equals 1050) without  corresponding high B value DWI signal abnormality or suspicious  enhancement. No extracapsular extension.    PI-RADS:  Peripheral zone T2: 3  Diffusion-weighted image: 2    Contrast-enhanced images: Negative      Overall assessment: 2    Transitional zone: There are BPH type changes throughout the  transitional zone.    PI-RADS:  Transition zone T2: 2  Diffusion-weighted image: 3  Contrast-enhanced images: Negative      Overall assessment: 2    Remainder of the pelvis:  No suspicious pelvic lymphadenopathy. The  distal ureters are nondilated. Mild urinary bladder wall  trabeculation. No pelvic free fluid. Visualized bowel is normal. No  suspicious bone marrow signal.      Impression    IMPRESSION:   1. Based on the most suspicious abnormality, this exam is  characterized as PIRADS 3 - Intermediate probability.  The most  suspicious abnormality is located in the posterior left peripheral  zone at the level of the base. It is possible possible that this could  represent a central zone asymmetry.  2. No suspicious adenopathy or evidence of pelvic metastases.  3. Diffuse T2 hypointense stranding throughout the peripheral zone  with areas of precontrast T1 hyperintensity consistent with  prostatitis and hemorrhage following recent prostate biopsy.    The images are interpreted according to PI-RADS v2.  [email or web address].acr.[email or web address]~/[email or web address]/[email or web address]/Resources  [email or web address]    I have personally reviewed the examination and initial interpretation  and I agree with the findings.    PAULA KAYE MD          Standardized Questionnaire:      [employer] SYMPTOM SCORE  SUM 28/35  QOL 4/6           Assessment and Plan:     Assessment: 67 year old male w/ hx of moderate volume [place] 6 prostate cancer severe LUTS    Plan:  -We discussed that this is a highly complex situation with numerous different treatment strategies.  He prefers to avoid radical prostatectomy if possible but is aware that this is a potentially attractive treatment option from the perspective that it would be most definitive in terms of his cancer and urinary symptoms.   -We also discussed a possible mini or full HoLEP.  He has a substantial median lobe on imaging and imagine he could get quite a bit of symptom relief if we were to mainly remove the median lobe.  A bigger resection could potentially make subsequent prostatectomy more challenging with delayed functional imrpovement if such treatment were deemed necessary.  Given young age and moderate volume disease this is something to consider.  -He would like some time to consider these options.  We ultimately planned on coordinating a cystoscopy to assess whether a median lobe only approach might be warranted.  If decided he prefers prostatectomy will reschedule with Dr. Méndez to make arrangements.       Scribe Disclosure:   I,Joseph Hogan, am serving as a scribe; to document services personally performed by Henry Oliveros MD based on data collection and the provider's statements to me.     I, Henry Oliveros saw and evaluated this patient and agree with the plan as stated above.  I personally performed all listed procedures.     > 25 minutes were spent with patient over 50% of which was spent face to face discussing urinary symptoms, medical treatment options and surgical treatment options for LUTS as well as prostate cancer treatment options     CC:  Marybeth Gaffney Christopher

## 2018-07-10 NOTE — PATIENT INSTRUCTIONS
Return for cystoscopy on July 20th at 10:00am.    Urine sent to lab for analysis.    It was a pleasure meeting with you today.  Thank you for allowing me and my team the privilege of caring for you today.  YOU are the reason we are here, and I truly hope we provided you with the excellent service you deserve.  Please let us know if there is anything else we can do for you so that we can be sure you are leaving completely satisfied with your care experience.       Jesus Oliveira LPN

## 2018-07-11 ASSESSMENT — PATIENT HEALTH QUESTIONNAIRE - PHQ9: SUM OF ALL RESPONSES TO PHQ QUESTIONS 1-9: 7

## 2018-07-12 ENCOUNTER — PRE VISIT (OUTPATIENT)
Dept: UROLOGY | Facility: CLINIC | Age: 68
End: 2018-07-12

## 2018-07-20 ENCOUNTER — OFFICE VISIT (OUTPATIENT)
Dept: UROLOGY | Facility: CLINIC | Age: 68
End: 2018-07-20
Payer: COMMERCIAL

## 2018-07-20 VITALS
DIASTOLIC BLOOD PRESSURE: 94 MMHG | WEIGHT: 167 LBS | SYSTOLIC BLOOD PRESSURE: 129 MMHG | BODY MASS INDEX: 24.73 KG/M2 | HEART RATE: 75 BPM | HEIGHT: 69 IN

## 2018-07-20 DIAGNOSIS — R33.9 URINARY RETENTION: Primary | ICD-10-CM

## 2018-07-20 ASSESSMENT — PAIN SCALES - GENERAL
PAINLEVEL: NO PAIN (0)
PAINLEVEL: NO PAIN (0)

## 2018-07-20 NOTE — NURSING NOTE
Invasive Procedure Safety Checklist:    Procedure: Cystoscopy    Action: Complete sections and checkboxes as appropriate.    Pre-procedure:  1. Patient ID Verified with 2 identifiers (Alejandra and  or MRN) : YES    2. Procedure and site verified with patient/designee (when able) : YES    3. Accurate consent documentation in medical record : YES    4. H&P (or appropriate assessment) documented in medical record : NO  H&P must be up to 30 days prior to procedure an updated within 24 hours of                 Procedure as applicable.     5. Relevant diagnostic and radiology test results appropriately labeled and displayed as applicable : NO    6. Blood products, implants, devices, and/or special equipment available for the procedure as applicable : NO    7. Procedure site(s) marked with provider initials [Exclusions: ] : NO    8. Marking not required. Reason : Yes  Procedure does not require site marking    Time Out:     Time-Out performed immediately prior to starting procedure, including verbal and active participation of all team members addressing: YES    1. Correct patient identity.  2. Confirmed that the correct side and site are marked.  3. An accurate procedure to be done.  4. Agreement on the procedure to be done.  5. Correct patient position.  6. Relevant images and results are properly labeled and appropriately displayed.  7. The need to administer antibiotics or fluids for irrigation purposes during the procedure as applicable.  8. Safety precautions based on patient history or medication use.    During Procedure: Verification of correct person, site, and procedure occurs any time the responsibility for care of the patient is transferred to another member of the care team.    OVIDIO Boswell

## 2018-07-20 NOTE — NURSING NOTE
Chief Complaint   Patient presents with     RECHECK     LUTS follow up with cystoscopy     OVIDIO Boswell

## 2018-07-20 NOTE — PATIENT INSTRUCTIONS
Dr. Oliveros will discuss your case with Dr. Méndez.  They will contact you to schedule surgery.    It was a pleasure meeting with you today.  Thank you for allowing me and my team the privilege of caring for you today.  YOU are the reason we are here, and I truly hope we provided you with the excellent service you deserve.  Please let us know if there is anything else we can do for you so that we can be sure you are leaving completely satisfied with your care experience.      OVIDIO Boswell

## 2018-07-20 NOTE — LETTER
7/20/2018       RE: Andre Alas  [address]  [facility] [address]     Dear Colleague,    Thank you for referring your patient, Andre Alas, to the [facility]OLOGIC CANCERS at [facility]. Please see a copy of my visit note below.    CYSTOSCOPY PROCEDURE NOTE    Reason for cystoscopy: Investigate LUTS    Brief History: 67 year old male with history of moderate volume [place] 6 prostate cancer and severe LUTS. At our last visit, we discussed several treatment strategies, including radical prostatectomy which could potentially address both oncologic and functional concerns.  He is quite bothered by LUTS at the moment but does not want to undergo a procedure that would risk sexual function.    We also discussed mini or full HoLEP as another treatment option that may result in significant symptom relief, particularly if he has a large median lobe. He presents today for cystoscopy to further evaluate prostate anatomy.    PVR today was 600 ml.     CYSTOSCOPY  After obtaining informed consent, the patient was prepped and draped in the standard sterile fashion.  The 15 Citizen of [place] flexible cystoscope was inserted through the urethral meatus.      The anterior urethra was: normal without stricture.    The external sphincter was appropriately coapted.   The prostatic urethra demonstrated trilobar hypertrophy.    The bladder neck was occlusive.    The bladder was unremarkable for tumors, erythema or stones.    The ureteral orifices were identified on each side in orthotopic position with efflux of clear urine.   There were moderate trabeculations.    On retroflexion there was the usual bladder neck hyperemia.    There was large, ball-valving intravesical protrusion of the prostate.       The patient tolerated the procedure well without complication.        AUA sliding scale - 28/3    Uroflow/Post-Void Residual by Bladder Scan  Voided Volume -  315  qMax - 14.5  qAvg - 8.4  PVR - 640  Shape of curve - flat    Assessment/Plan:  66 yo M w/ hx of [place] 6 prostate cancer, severe LUTS from prostatic enlargement, particularly large median lobe  - Cystoscopy today demonstrated evidence of large, ball-valving intravesical lobe.    - We discussed formal treatment options for prostate cancer.  He prefers surveillance but would like to address his urinary retention without radical surgery.  Given the large intravesical lobe demonstrated on cystoscopy, could consider median lobe only procedure which would be likely to improve urinary symptoms without risking same degree of prostatic changes (scarring/thinning) that would make prostatectomy more challenging in the event he should ultimately elect for prostatectomy.  -We discussed the potential for upstaging of prostate cancer and need for subsequent prostate cancer treatment.  -Reviewed the need for continued surveilance including PSA monitoring and surveilance biopsies  - Patient would like more time to consider these options.   - Will readress above concerns with Dr. Méndez who sees him for prostate cancer  - CIC teaching today given extremely high PVR. Plan for CIC BID at minimum, goal volumes less than 300  -In terms of median lobe HoLEP did discuss alternative treatment options, potential for bleeding, infection, damage to urinary tract and adjacent structures. Potential for persistant symptoms, need for repeat procedures    Scribe Disclosure:   I,Joseph Hogan, am serving as a scribe; to document services personally performed by Henry Oliveros MD based on data collection and the provider's statements to me.     I, Henry Oliveros saw and evaluated this patient and agree with the plan as stated above.  I personally performed all listed procedures.             Again, thank you for allowing me to participate in the care of your patient.      Sincerely,    Henry Oliveros MD

## 2018-07-20 NOTE — NURSING NOTE
Patient was taught CIC using a 16 F coude catheter.  Patient requires a coude catheter due to enlarged prostate.  RX for catheter faxed to [facility].  Patient is to cath BID per Dr. Oliveros.    OVIDIO Boswell

## 2018-07-20 NOTE — MR AVS SNAPSHOT
"              After Visit Summary   7/20/2018    Andre Alas    MRN: [id number]           Patient Information     Date Of Birth          1950        Visit Information        Provider Department      7/20/2018 10:00 AM Henry Oliveros MD [facility] and [facility] for Prostate and Urologic Cancers        Care Instructions    Dr. Oliveros will discuss your case with Dr. Méndez.  They will contact you to schedule surgery.    It was a pleasure meeting with you today.  Thank you for allowing me and my team the privilege of caring for you today.  YOU are the reason we are here, and I truly hope we provided you with the excellent service you deserve.  Please let us know if there is anything else we can do for you so that we can be sure you are leaving completely satisfied with your care experience.      OVIDIO Boswell          Follow-ups after your visit        Your next 10 appointments already scheduled     Jul 20, 2018 11:00 AM CDT   (Arrive by 10:45 AM)   Return Visit with  Prostate Cancer Ctr Nurse   [facility] Urology and [facility] for Prostate and Urologic Cancers ([facility])    [address] [place] [id number] [phone]              Who to contact     Please call your clinic at [phone] to:    Ask questions about your health    Make or cancel appointments    Discuss your medicines    Learn about your test results    Speak to your doctor            Additional Information About Your Visit        Your Vitals Were     Pulse Height BMI (Body Mass Index)             75 1.753 m (5' 9\") 24.66 kg/m2          Blood Pressure from Last 3 Encounters:   07/20/18 (!) 129/94   07/10/18 136/78   06/14/18 111/84    Weight from Last 3 Encounters:   07/20/18 75.8 kg (167 lb)   07/10/18 75.9 kg (167 lb 6.4 oz)   06/14/18 76 kg (167 lb 8 oz)              Today, you had the following     No orders found for display       Primary Care Provider Office Phone # Fax # "    Marybeth Gaffney [phone]       [address] [facility]  [place] [address]        Equal Access to Services     USMANROBERT MICAELA : Ezra shamar milton moe Peraza, larisa borges, adam kahaylee tenorio, clara zaratederic macy. So [facility] [phone].    ATENCIÓN: Si habla español, tiene a espino disposición servicios gratuitos de asistencia lingüística. Llame al [phone].    We comply with applicable federal civil rights laws and [place] laws. We do not discriminate on the basis of race, color, national origin, age, disability, sex, sexual orientation, or gender identity.            Thank you!     Thank you for choosing [facility]OLOGY AND [facility] [[email or web address]] FOR PROSTATE AND UROLOGIC CANCERS  for your care. Our goal is always to provide you with excellent care. Hearing back from our patients is one way we can continue to improve our services. Please take a few minutes to complete the written survey that you may receive in the mail after your visit with us. Thank you!             Your Updated Medication List - Protect others around you: Learn how to safely use, store and throw away your medicines at www.disposemymeds.org.          This list is accurate as of 7/20/18 10:56 AM.  Always use your most recent med list.                   Brand Name Dispense Instructions for use Diagnosis    ASPIRIN NOT PRESCRIBED    INTENTIONAL    0 each    Please choose reason not prescribed, below    Old myocardial infarction       mupirocin 2 % ointment    BACTROBAN          * XARELTO 15 MG Tabs tablet   Generic drug:  rivaroxaban ANTICOAGULANT           * rivaroxaban ANTICOAGULANT 20 MG Tabs tablet    XARELTO     Take 15 mg by mouth daily (with dinner)    PAF (paroxysmal atrial fibrillation) (H)       VITAMIN B COMPLEX PO      Take 2 tablets by mouth. Every 2 weeks        VITAMIN C PO      Take 3 tablets by mouth. Every 2 weeks        vitamin E 200 UNIT capsule      Take 2 capsules by mouth. Every 2 weeks         Zinc Gluconate 100 MG Tabs      Take 2 tablets by mouth. Every 2 weeks        * Notice:  This list has 2 medication(s) that are the same as other medications prescribed for you. Read the directions carefully, and ask your doctor or other care provider to review them with you.

## 2018-07-23 DIAGNOSIS — R33.9 URINARY RETENTION: Primary | ICD-10-CM

## 2018-07-24 ENCOUNTER — TELEPHONE (OUTPATIENT)
Dept: UROLOGY | Facility: CLINIC | Age: 68
End: 2018-07-24

## 2018-07-24 NOTE — TELEPHONE ENCOUNTER
I spoke to the patient surgery scheduled for 09/27/18 at 11:30am with a 9:30am check in. The patient is scheduled for a PAC appt on 09/07/18 at 3:00pm. Surgery packet being mailed today.

## 2018-07-27 ENCOUNTER — TELEPHONE (OUTPATIENT)
Dept: UROLOGY | Facility: CLINIC | Age: 68
End: 2018-07-27

## 2018-07-27 NOTE — TELEPHONE ENCOUNTER
Health Call Center    Phone Message    May a detailed message be left on voicemail: no    Reason for Call: Other: Pt called in to speak with a nurse to discuss his options for helping with his current difficulty passing urine despite having a steady supply of catheters. Pt said the catheters are fine but he thinks something may be blocked inside him. This has persisted the last 2 days. He also reports a recent day where he had a feverish temperature feeling going all around his body coupled with pain. Please contact Pt to discuss.     Action Taken: Message routed to:  [facility] ([facility]): [facility]OLOG[facility] ADULT [facility]

## 2018-07-27 NOTE — TELEPHONE ENCOUNTER
Spoke with Suellen and Dr. Oliveros.  Dr. Oliveros orders increase catheterizations to TID.  Called Miah at [facility] who took verbal order and will get catheters to him ASAP.  Patient notified.

## 2018-08-01 ENCOUNTER — CARE COORDINATION (OUTPATIENT)
Dept: UROLOGY | Facility: CLINIC | Age: 68
End: 2018-08-01

## 2018-08-01 NOTE — PROGRESS NOTES
Left message for patient to return my call. I want to make sure that he plans on getting a pre-op physical and urine culture with his primary 2 weeks prior to his surgery with Dr. Oliveros on 9-. Also, I want to make sure he is off his Xarelto. I asked that he return my call. Suellen Wilks RN

## 2018-08-02 DIAGNOSIS — R30.0 DYSURIA: Primary | ICD-10-CM

## 2018-08-02 NOTE — PROGRESS NOTES
Spoke with patient. He is off Xarelto and is having PAC on September 7th at 3pm. Suellen Wilks RN

## 2018-08-03 ENCOUNTER — TELEPHONE (OUTPATIENT)
Dept: UROLOGY | Facility: CLINIC | Age: 68
End: 2018-08-03

## 2018-08-03 DIAGNOSIS — N39.0 URINARY TRACT INFECTION: ICD-10-CM

## 2018-08-03 DIAGNOSIS — N39.0 URINARY TRACT INFECTION: Primary | ICD-10-CM

## 2018-08-03 LAB
ALBUMIN UR-MCNC: >=300 MG/DL
APPEARANCE UR: ABNORMAL
BACTERIA #/AREA URNS HPF: ABNORMAL /HPF
BILIRUB UR QL STRIP: NEGATIVE
COLOR UR AUTO: ABNORMAL
GLUCOSE UR STRIP-MCNC: NEGATIVE MG/DL
HGB UR QL STRIP: ABNORMAL
KETONES UR STRIP-MCNC: NEGATIVE MG/DL
LEUKOCYTE ESTERASE UR QL STRIP: ABNORMAL
NITRATE UR QL: NEGATIVE
PH UR STRIP: 5.5 PH (ref 5–7)
RBC #/AREA URNS AUTO: >100 /HPF
SOURCE: ABNORMAL
SP GR UR STRIP: 1.02 (ref 1–1.03)
UROBILINOGEN UR STRIP-ACNC: 0.2 EU/DL (ref 0.2–1)
WBC #/AREA URNS AUTO: ABNORMAL /HPF

## 2018-08-03 PROCEDURE — 87088 URINE BACTERIA CULTURE: CPT | Performed by: UROLOGY

## 2018-08-03 PROCEDURE — 81001 URINALYSIS AUTO W/SCOPE: CPT | Performed by: UROLOGY

## 2018-08-03 PROCEDURE — 87086 URINE CULTURE/COLONY COUNT: CPT | Performed by: UROLOGY

## 2018-08-03 PROCEDURE — 87186 SC STD MICRODIL/AGAR DIL: CPT | Performed by: UROLOGY

## 2018-08-03 RX ORDER — CIPROFLOXACIN 500 MG/1
500 TABLET, FILM COATED ORAL 2 TIMES DAILY
Qty: 10 TABLET | Refills: 0 | Status: SHIPPED | OUTPATIENT
Start: 2018-08-03 | End: 2018-09-07

## 2018-08-03 NOTE — TELEPHONE ENCOUNTER
Patient called and having significant blood in urine  We discussed about his history of prostate cancer and on and off bleeding since his prostate biopsy   He does cath himself one time each night but the pain and blood was just too much last night.  I will get uauc and discuss with MD on call. Marina Vitale LPN Staff Nurse

## 2018-08-03 NOTE — TELEPHONE ENCOUNTER
[facility] Call Center    Phone Message    May a detailed message be left on voicemail: no    Reason for Call: Symptoms or Concerns     If patient has red-flag symptoms, warm transfer to triage line    Current symptom or concern: Blood in urine, started yesterday and has continued today. First time the blood was thick and  from urine, all other times pt urinated the blood was mixed with urine but was dark red everytime pt urinated yesterday. Per pt this morning was the same dark red blood. Pt asking for a call back as soon as possible this morning.     Symptoms have been present for:  2 day(s)    Has patient previously been seen for this? No    By : Dr. Oliveros    Date: 8/3/18    Are there any new or worsening symptoms? Yes: Blood continues to get worse.      Action Taken: Message routed to:  [facility]

## 2018-08-07 ENCOUNTER — TELEPHONE (OUTPATIENT)
Dept: UROLOGY | Facility: CLINIC | Age: 68
End: 2018-08-07

## 2018-08-07 NOTE — TELEPHONE ENCOUNTER
Waiting for results on sensitivities. Patient informed that they will not be back until Thursday.

## 2018-08-07 NOTE — TELEPHONE ENCOUNTER
Called, spoke with Andre letting him know Dr. Oliveros has requested sensitivities from the lab and that should be complete on Thursday.  I told him he could call us Thursday afternoon to check results or wait for Dr. Oliveros's staff to be in communication with him if treatment is indicated.

## 2018-08-07 NOTE — TELEPHONE ENCOUNTER
[facility] Call Center    Phone Message    May a detailed message be left on voicemail: yes    Reason for Call: Other: Please call the pt.  He has questions about his urine lab result.     Action Taken: Message routed to:  [facility] Urology

## 2018-08-09 LAB
BACTERIA SPEC CULT: ABNORMAL
BACTERIA SPEC CULT: ABNORMAL
SPECIMEN SOURCE: ABNORMAL

## 2018-08-10 ENCOUNTER — TELEPHONE (OUTPATIENT)
Dept: UROLOGY | Facility: CLINIC | Age: 68
End: 2018-08-10

## 2018-08-10 ENCOUNTER — CARE COORDINATION (OUTPATIENT)
Dept: UROLOGY | Facility: CLINIC | Age: 68
End: 2018-08-10

## 2018-08-10 NOTE — TELEPHONE ENCOUNTER
I called home and spoke with wife. Per Dr. Kumar, prefers not to treat based on recent urine culture. If patient is symptomatic then start Amoxicillin 500mg TID X 7 days. Wife stated he is so much better. Denies fever, chills, frequency, urgency. Instructed wife to call if any of these symptoms return then we would treat. Verbalized understanding and agrees with plan. Suellen Wilks RN

## 2018-08-10 NOTE — TELEPHONE ENCOUNTER
[facility] Call Center    Phone Message    May a detailed message be left on voicemail: no    Reason for Call: Requesting Results   Name/type of test: Labs, UA, Urine Culture  Date of test: 8/3/18  Was test done at a location other than [facility] (Please fill in the location if not [facility])?: No   Comments: Please contact Pt to discuss, Pt was supposed to be called yesterday.    Action Taken: Message routed to:  [facility]

## 2018-09-07 ENCOUNTER — ANESTHESIA EVENT (OUTPATIENT)
Dept: SURGERY | Facility: CLINIC | Age: 68
End: 2018-09-07

## 2018-09-07 ENCOUNTER — OFFICE VISIT (OUTPATIENT)
Dept: SURGERY | Facility: CLINIC | Age: 68
End: 2018-09-07
Payer: COMMERCIAL

## 2018-09-07 VITALS
WEIGHT: 169.2 LBS | HEART RATE: 58 BPM | HEIGHT: 69 IN | DIASTOLIC BLOOD PRESSURE: 90 MMHG | OXYGEN SATURATION: 98 % | TEMPERATURE: 97.3 F | BODY MASS INDEX: 25.06 KG/M2 | SYSTOLIC BLOOD PRESSURE: 162 MMHG

## 2018-09-07 DIAGNOSIS — C61 MALIGNANT NEOPLASM OF PROSTATE (H): ICD-10-CM

## 2018-09-07 DIAGNOSIS — R30.0 DYSURIA: ICD-10-CM

## 2018-09-07 DIAGNOSIS — Z01.818 PREOP EXAMINATION: Primary | ICD-10-CM

## 2018-09-07 DIAGNOSIS — R33.9 URINARY RETENTION: ICD-10-CM

## 2018-09-07 DIAGNOSIS — E78.5 HYPERLIPIDEMIA LDL GOAL <70: ICD-10-CM

## 2018-09-07 DIAGNOSIS — I25.2 OLD MYOCARDIAL INFARCTION: ICD-10-CM

## 2018-09-07 LAB
ALBUMIN SERPL-MCNC: 4.2 G/DL (ref 3.4–5)
ALBUMIN UR-MCNC: NEGATIVE MG/DL
ALP SERPL-CCNC: 96 U/L (ref 40–150)
ALT SERPL W P-5'-P-CCNC: 41 U/L (ref 0–70)
ANION GAP SERPL CALCULATED.3IONS-SCNC: 5 MMOL/L (ref 3–14)
APPEARANCE UR: ABNORMAL
AST SERPL W P-5'-P-CCNC: 25 U/L (ref 0–45)
BILIRUB SERPL-MCNC: 0.9 MG/DL (ref 0.2–1.3)
BILIRUB UR QL STRIP: NEGATIVE
BUN SERPL-MCNC: 17 MG/DL (ref 7–30)
CALCIUM SERPL-MCNC: 8.6 MG/DL (ref 8.5–10.1)
CHLORIDE SERPL-SCNC: 104 MMOL/L (ref 94–109)
CHOLEST SERPL-MCNC: 162 MG/DL
CO2 SERPL-SCNC: 30 MMOL/L (ref 20–32)
COLOR UR AUTO: YELLOW
CREAT SERPL-MCNC: 0.98 MG/DL (ref 0.66–1.25)
ERYTHROCYTE [DISTWIDTH] IN BLOOD BY AUTOMATED COUNT: 13.1 % (ref 10–15)
GFR SERPL CREATININE-BSD FRML MDRD: 76 ML/MIN/1.7M2
GLUCOSE SERPL-MCNC: 83 MG/DL (ref 70–99)
GLUCOSE UR STRIP-MCNC: NEGATIVE MG/DL
HCT VFR BLD AUTO: 44.8 % (ref 40–53)
HDLC SERPL-MCNC: 52 MG/DL
HGB BLD-MCNC: 15.6 G/DL (ref 13.3–17.7)
HGB UR QL STRIP: NEGATIVE
KETONES UR STRIP-MCNC: NEGATIVE MG/DL
LDLC SERPL CALC-MCNC: 90 MG/DL
LEUKOCYTE ESTERASE UR QL STRIP: ABNORMAL
MCH RBC QN AUTO: 30.8 PG (ref 26.5–33)
MCHC RBC AUTO-ENTMCNC: 34.8 G/DL (ref 31.5–36.5)
MCV RBC AUTO: 88 FL (ref 78–100)
MUCOUS THREADS #/AREA URNS LPF: PRESENT /LPF
NITRATE UR QL: NEGATIVE
NONHDLC SERPL-MCNC: 110 MG/DL
PH UR STRIP: 5 PH (ref 5–7)
PLATELET # BLD AUTO: 156 10E9/L (ref 150–450)
POTASSIUM SERPL-SCNC: 4 MMOL/L (ref 3.4–5.3)
PROT SERPL-MCNC: 7.9 G/DL (ref 6.8–8.8)
PSA SERPL-MCNC: 13.8 UG/L (ref 0–4)
RBC # BLD AUTO: 5.07 10E12/L (ref 4.4–5.9)
RBC #/AREA URNS AUTO: 7 /HPF (ref 0–2)
SODIUM SERPL-SCNC: 140 MMOL/L (ref 133–144)
SOURCE: ABNORMAL
SP GR UR STRIP: 1.01 (ref 1–1.03)
TRIGL SERPL-MCNC: 100 MG/DL
TSH SERPL DL<=0.005 MIU/L-ACNC: 1.69 MU/L (ref 0.4–4)
UROBILINOGEN UR STRIP-MCNC: 0 MG/DL (ref 0–2)
WBC # BLD AUTO: 6.2 10E9/L (ref 4–11)
WBC #/AREA URNS AUTO: 62 /HPF (ref 0–5)

## 2018-09-07 ASSESSMENT — ENCOUNTER SYMPTOMS: DYSRHYTHMIAS: 1

## 2018-09-07 NOTE — LETTER
[facility]  [address] [place] [address]      September 12, 2018    Andre Alas  [address] [place] [facility] [address]          Dear Pita Powell is a copy of your results.    Results for orders placed or performed in visit on 09/07/18   Lipid panel reflex to direct LDL Fasting   Result Value Ref Range    Cholesterol 162 <200 mg/dL    Triglycerides 100 <150 mg/dL    HDL Cholesterol 52 >39 mg/dL    LDL Cholesterol Calculated 90 <100 mg/dL    Non HDL Cholesterol 110 <130 mg/dL   TSH with free T4 reflex   Result Value Ref Range    TSH 1.69 0.40 - 4.00 mU/L   Comprehensive metabolic panel   Result Value Ref Range    Sodium 140 133 - 144 mmol/L    Potassium 4.0 3.4 - 5.3 mmol/L    Chloride 104 94 - 109 mmol/L    Carbon Dioxide 30 20 - 32 mmol/L    Anion Gap 5 3 - 14 mmol/L    Glucose 83 70 - 99 mg/dL    Urea Nitrogen 17 7 - 30 mg/dL    Creatinine 0.98 0.66 - 1.25 mg/dL    GFR Estimate 76 >60 mL/min/1.7m2    GFR Estimate If Black >90 >60 mL/min/1.7m2    Calcium 8.6 8.5 - 10.1 mg/dL    Bilirubin Total 0.9 0.2 - 1.3 mg/dL    Albumin 4.2 3.4 - 5.0 g/dL    Protein Total 7.9 6.8 - 8.8 g/dL    Alkaline Phosphatase 96 40 - 150 U/L    ALT 41 0 - 70 U/L    AST 25 0 - 45 U/L   PSA tumor marker   Result Value Ref Range    PSA 13.80 (H) 0 - 4 ug/L   Routine UA with micro reflex to culture   Result Value Ref Range    Color Urine Yellow     Appearance Urine Slightly Cloudy     Glucose Urine Negative NEG^Negative mg/dL    Bilirubin Urine Negative NEG^Negative    Ketones Urine Negative NEG^Negative mg/dL    Specific Gravity Urine 1.009 1.003 - 1.035    Blood Urine Negative NEG^Negative    pH Urine 5.0 5.0 - 7.0 pH    Protein Albumin Urine Negative NEG^Negative mg/dL    Urobilinogen mg/dL 0.0 0.0 - 2.0 mg/dL    Nitrite Urine Negative NEG^Negative    Leukocyte Esterase Urine Moderate (A) NEG^Negative    Source Midstream Urine     WBC Urine 62 (H) 0 - 5 /HPF    RBC Urine 7 (H) 0 - 2 /HPF     Mucous Urine Present (A) NEG^Negative /LPF   CBC with platelets   Result Value Ref Range    WBC 6.2 4.0 - 11.0 10e9/L    RBC Count 5.07 4.4 - 5.9 10e12/L    Hemoglobin 15.6 13.3 - 17.7 g/dL    Hematocrit 44.8 40.0 - 53.0 %    MCV 88 78 - 100 fl    MCH 30.8 26.5 - 33.0 pg    MCHC 34.8 31.5 - 36.5 g/dL    RDW 13.1 10.0 - 15.0 %    Platelet Count 156 150 - 450 10e9/L   Urine Culture Aerobic Bacterial   Result Value Ref Range    Specimen Description Midstream Urine     Special Requests Specimen received in preservative     Culture Micro (A)      >100,000 colonies/mL  Coagulase negative Staphylococcus         Susceptibility    Coagulase negative staphylococcus - HIPOLITO     CIPROFLOXACIN <=0.5 Sensitive ug/mL     GENTAMICIN <=0.5 Sensitive ug/mL     LEVOFLOXACIN 0.25 Sensitive ug/mL     NITROFURANTOIN 32 Sensitive ug/mL     OXACILLIN <=0.25 Sensitive ug/mL     PENICILLIN  Resistant ug/mL     TETRACYCLINE <=1 Sensitive ug/mL     VANCOMYCIN 1 Sensitive ug/mL     Trimethoprim/Sulfa  Sensitive ug/mL       If you have any questions or concerns, please call [phone]      Sincerely,        Dr. Henry Guadarrama

## 2018-09-07 NOTE — ANESTHESIA PREPROCEDURE EVALUATION
Anesthesia Evaluation     . Pt has had prior anesthetic. Type: MAC and General    No history of anesthetic complications          ROS/MED HX    ENT/Pulmonary:  - neg pulmonary ROS     Neurologic:     (+)migraines, CVA date: 2014 x 2 without deficits    Cardiovascular: Comment: -History of MI in 2014 - cath showed normal coronaries, MI thought to be related to the A-fib.      - AV canal defect/gerbode defect/ASD        (+) ----. : . . . :. dysrhythmias a-fib, . Previous cardiac testing Echodate:9/27/2018results:Final Impressions:   1. Partial AV-canal defect with small septum primum ASD. There is also a defect/fenestration of the atrioventricular septum leading to LV-RA (and also LA) communication consistent with Gerbode defect. The septal leaflet of the tricuspid valve and the anterior leaflet of the mitral valve insert at the same level. There is no VSD. These findings are better appreciated on prior Echo dated 9/2/16.   2. LVEF estimate 60-65%. Normal LV size. Moderate concentric LVH.   3. Normal RV size and global systolic function.   4. Mild mitral and mild aortic regurgitation.   5. Mild biatrial enlargement.   6. Normal PA systolic and RA pressure estimates.   7. Mildly dilated aortic root (3.8 cm).    Chamber Sizes and Function  Moderate concentric left ventricular hypertrophy. Left atrial size is mildly enlarged. Right ventricular cavity size is normal, global systolic RV function is normal. The right atrium is mildly enlarged. Right atrial volume index is 25 ml/m . The pulmonary artery is of normal size and origin. The sinus of Valsalva is dilated. The ascending aorta is normal sized. The aortic arch is normal.date: results: date: results:Cath date: 2014 results:Summary/Conclusions  PRESENTATION / INDICATIONS   Acute ST Elevation Myocardial Infarction   Chest Pain    DIAGNOSTIC SUMMARY   The LAD is widely patent with mild mid-stenosis   The Cx is widely patent   The RCA is widely patent (dominant)   New  Afib    LEFT VENTRICULAR FUNCTION   Left ventricular ejection fraction based on LV Gram is 75%. LV   Pressure = 110/5.    SPECIAL PROCEDURES   Right femoral arteriotomy  was successfully closed utilizing a   closure device    RECOMMENDATIONS & PLAN   No culprit identified at angiography. Somewhat slow flow in   coronary arteries may be secondary to a-fib.      Pre-Operative Diagnosis / Indications   Chest Pain    Acute ST Elevation Myocardial Infarction           METS/Exercise Tolerance:  >4 METS   Hematologic:  - neg hematologic  ROS       Musculoskeletal:  - neg musculoskeletal ROS       GI/Hepatic:  - neg GI/hepatic ROS       Renal/Genitourinary:     (+) BPH, urinary retention      Endo:  - neg endo ROS       Psychiatric:  - neg psychiatric ROS       Infectious Disease:  - neg infectious disease ROS       Malignancy:   (+) Malignancy History of Prostate  Prostate CA Active status post,         Other:    (+) no H/O Chronic Pain,                   Physical Exam  Normal systems: cardiovascular, pulmonary and dental    Airway   Mallampati: II  TM distance: >3 FB  Neck ROM: full    Dental     Cardiovascular   Rhythm and rate: regular and normal      Pulmonary    breath sounds clear to auscultation               PAC Discussion and Assessment    ASA Classification: 3  Case is suitable for: [facility]  Anesthetic techniques and relevant risks discussed: GA  Invasive monitoring and risk discussed:   Types:   Possibility and Risk of blood transfusion discussed:   NPO instructions given:   Additional anesthetic preparation and risks discussed:   Needs early admission to pre-op area:   Other:     PAC Resident/NP Anesthesia Assessment:  Andre Alas is a 68 year old male scheduled for Holmium Laser Enucleation Of The Prostate on 9/7/2018 by Dr. Oliveros in treatment of urinary retention in the setting of prostate cancer.  PAC referral for risk assessment and optimization for anesthesia with comorbid conditions of: AV canal  defect, gerbode defect, ASD, atrial fibrillation, old MI, history of CVA and BPH.    Pre-operative considerations:  1.  Cardiac:  Functional status- METS >4.  He exercises every day doing multiple strength exercises and walking 3 miles.  He was prescribed Xarelto for A-fib, but reports that he stopped taking it in the end of July after he continued to bleed after a cystoscopy procedure.  He didn't notify his cardiologist and reports he doesn't want to restart it or any other anticoagulant.  He reports that he feels much better off of the medication.  Discussed at length today and Dr. Day was able to convince the patient to restart his Xarelto due to high risk without it.  CHADSVasc = 4.  Instructed to stop Xarelto 48 hours prior to surgery.  He had a MI in 2014 diagnosed apparently based on elevated troponin levels as he was taken to the cath lab and he had no CAD.  It was noted that the MI was likely related to the new onset A-fib at that time.  He saw his cardiologist that is following the above listed cardiac defects and A-fib last on 9/27/2017.  They noted him as stable and recommended follow up with cardiac MRI in 1 year.  The VSD is not surgically repairable based on location.  He currently denies any concerning cardiac symptoms.  His last echo on 9/27/2018 showed an EF of 60-65%, the above defects, mild aortic regurgitation and LVH.  Intermediate risk surgery with 6.6% risk of major adverse cardiac event.   2.  Pulm:  Airway feasible.  JASWINDER risk: low.  3.  GI:  Risk of PONV score = 2.  If > 2, anti-emetic intervention recommended.  4. Neuro:  History of CVA x 2 in 2014.  Denies any residual effects.  5. :  Self caths twice per day in addition to normal urination for urinary retention.    VTE risk: 3%    Patient is optimized and is acceptable candidate for the proposed procedure.  No further diagnostic evaluation is needed.     Patient also evaluated by Dr. Day. See recommendations below.     **For  further details of assessment, testing, and physical exam please see H and P completed on same date.          Naomy Wilks DNP, RN, APRN      Reviewed and Signed by PAC Mid-Level Provider/Resident  Mid-Level Provider/Resident: Naomy Wilks DNP, RN, APRN  Date: 9/7/2018  Time: 1621    Attending Anesthesiologist Anesthesia Assessment:  STAFF:  Very robust 68 y.o. man with prostate disease for laser enucleation by Dr. Oliveros  using general anesthesia.   History summarized above.  Of note =   1. 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  Septal defect is NOT amenable to a device closure.    2. Patient had 2 prior (presumably embolic) strokes.  Now recovered and should be on Xarelto (15 mg).  He is not always compliant with this, but we encouraged him to take the Xarelto until 2 - 3 days prior to his upcoming surgery, and then hold it.   Other elements noted above.   Instructions given and questions answered.   Final plans by anesthesiology team on DOS.   ---rcp      Reviewed and Signed by PAC Anesthesiologist  Anesthesiologist: elisabeth  Date: 9/7/2018  Time:   Pass/Fail: Pass  Disposition:     PAC Pharmacist Assessment:        Pharmacist:   Date:   Time:                           .

## 2018-09-07 NOTE — PATIENT INSTRUCTIONS
Preparing for Your Surgery      Name:  Andre Alas   MRN:  [id number]   :  1950   Today's Date:  2018     Arriving for surgery:  Surgery date:    Arrival time:  11:00 AM  Please come to:     [facility] [address]  [address] [place]  [address]    - parking is available in front of [facility] from 5:15AM to 8:00PM. If you prefer, park your car in the Green Lot.    -Proceed to the 3rd floor, check in at the Adult Surgery Waiting Lounge. [phone]    If an escort is needed stop at the Information Desk in the lobby. Inform the information person that you are here for surgery. An escort to the Adult Surgery Waiting Lounge will be provided.        What can I eat or drink?  -  You may have solid food or milk products until 8 hours prior to your surgery. 5 AM  -  You may have water, apple juice or 7up/Sprite until 2 hours prior to your surgery. 11 AM    Which medicines can I take?  Stop Aspirin, vitamins and supplements one week prior to surgery.  Hold Ibuprofen and Naproxen for 24 hours prior to surgery.   Xarelto not to be taken , , and     How do I prepare myself?  -  Take two showers: one the night before surgery; and one the morning of surgery.         Use Scrubcare or Hibiclens to wash from neck down.  You may use your own     shampoo and conditioner. No other hair products.   -  Do NOT use lotion, powder, deodorant, or antiperspirant the day of your surgery.  -  Do NOT wear a watch or jewelry.  - Do not bring your own medications to the hospital, except for inhalers and eye   drops.  -  Bring your ID and insurance card.    Questions or Concerns:  -If you have questions or concerns regarding the day of surgery, please call [phone].     -For questions after surgery please call your surgeons office.           AFTER YOUR SURGERY  Breathing exercises   Breathing exercises help you recover faster.  Take deep breaths and let the air out slowly. This will:     Help you wake up after surgery.    Help prevent complications like pneumonia.  Preventing complications will help you go home sooner.   Nausea and vomiting   You may feel sick to your stomach after surgery; if so, let your nurse know.    Pain control:  After surgery, you may have pain. Our goal is to help you manage your pain. Pain medicine will help you feel comfortable enough to do activities that will help you heal.  These activities may include breathing exercises, walking and physical therapy.   To help your health care team treat your pain we will ask: 1) If you have pain  2) where it is located 3) describe your pain in your words  Methods of pain control include medications given by mouth, vein or by nerve block for some surgeries.  We may give you a pain control pump that will:  1) Deliver the medicine through a tube placed in your vein  2) Control the amount of medicine you receive  3) Allow you to push a button to deliver a dose of pain medicine  Sequential Compression Device (SCD) or Pneumo Boots:  You may need to wear SCD S on your legs or feet. These are wraps connected to a machine that pumps in air and releases it. The repeated pumping helps prevent blood clots from forming.

## 2018-09-07 NOTE — PROGRESS NOTES
Preoperative Assessment Center medication history for September 7, 2018 is complete.    See Epic admission navigator for prior to admission medications.   Operating room staff will still need to confirm medications and last dose information on day of surgery.     Medication history interview sources:  patient    Changes made to PTA medication list (reason)  Added: none  Deleted: mupirocin (not taking), ciprofloxacin (completed course at beginning of August).   Changed: doses on vit E, zinc, B complex and vit C - patient rotates these medications and takes total of 3 tablets of each over a 2 week period    Additional medication history information (including reliability of information, actions taken by pharmacist):  -- patient is off xarelto currently due to bleeding complications after his last cystoscopy, however he may be resuming this after discussion with PAC MD today.   -- No recent (within 30 days) course of antibiotics  -- No recent (within 30 days) course of steroids  -- No recent (within 30 days) chronic daily medications stopped   -- Patient declines being on any other prescription or over-the-counter medications    Prior to Admission medications    Medication Sig Last Dose Taking? Auth Provider   Ascorbic Acid (VITAMIN C PO) Takes total of 3 tablets every 2 weeks (divided) Taking Yes Reported, Patient   B Complex Vitamins (VITAMIN B COMPLEX PO) Takes total of 3 tablets every 2 weeks (divided) Taking Yes Reported, Patient   vitamin E (TOCOPHEROL) 100 UNIT capsule Takes total of 3 tablets every 2 weeks (divided)  Yes Unknown, Entered By History   Zinc Gluconate (ZINC) 100 MG TABS Takes total of 3 tablets every 2 weeks (divided) Taking Yes Reported, Patient   ASPIRIN NOT PRESCRIBED (INTENTIONAL) Please choose reason not prescribed, below   Marybeth Gaffney MD   rivaroxaban ANTICOAGULANT (XARELTO) 20 MG TABS tablet Take 15 mg by mouth daily (with dinner)  Not Taking  Reported, Patient       Medication history  completed by: Marcio Pink, [facility]

## 2018-09-07 NOTE — H&P
Pre-Operative H & P     CC:  Preoperative exam to assess for increased cardiopulmonary risk while undergoing surgery and anesthesia.    Date of Encounter: 9/7/2018  Primary Care Physician:  Marybeth Gaffney  Associated diagnosis:  Urinary retention    HPI  Andre Alas is a 68 year old male who presents for pre-operative H & P in preparation for Holmium Laser Enucleation Of The Prostate with Dr. Oliveros on 9/27/2018 at [facility].     Andre Alas is a 68 year old male with AV canal defect, gerbode defect, ASD, atrial fibrillation, old MI, history of CVA and BPH that has urinary retention in the setting of prostate cancer.  He is currently self cathing twice per day in addition to urinating on his own.  He has been following with Dr. Oliveros and the above listed procedure has been recommended for treatment.     History is obtained from the patient.     Past Medical History  Past Medical History:   Diagnosis Date     Allergic rhinitis due to dust 11/29/2011     Atrioventricular canal (AVC), incomplete      BPH without urinary obstruction 11/29/2011     CVA (cerebral vascular accident) (H) 07/2014    embolic 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     Hyperlipidemia LDL goal <70 11/28/2011     Hypertension goal BP (blood pressure) < 140/90      NSTEMI (non-ST elevated myocardial infarction) (H) 7/2014    prob sec to PAF -- no sig CAD     PAF (paroxysmal atrial fibrillation) (H) 7/2014     Prostate cancer (H) 02/2018       Past Surgical History  Past Surgical History:   Procedure Laterality Date     ANGIOGRAM  2014    normal - non ST MI     COLONOSCOPY       DENTAL SURGERY  2000     HERNIA REPAIR, INGUINAL RT/LT  1952       Hx of Blood transfusions/reactions: none     Hx of abnormal bleeding or anti-platelet use: none        Steroid use in the last year: none    Personal or FH with difficulty with Anesthesia:  none    Prior to Admission Medications  Current Outpatient Prescriptions   Medication  Sig Dispense Refill     Ascorbic Acid (VITAMIN C PO) Takes total of 3 tablets every 2 weeks (divided)       ASPIRIN NOT PRESCRIBED (INTENTIONAL) Please choose reason not prescribed, below 0 each 0     B Complex Vitamins (VITAMIN B COMPLEX PO) Takes total of 3 tablets every 2 weeks (divided)       rivaroxaban ANTICOAGULANT (XARELTO) 20 MG TABS tablet Take 15 mg by mouth daily (with dinner)        vitamin E (TOCOPHEROL) 100 UNIT capsule Takes total of 3 tablets every 2 weeks (divided)       Zinc Gluconate (ZINC) 100 MG TABS Takes total of 3 tablets every 2 weeks (divided)       [DISCONTINUED] XARELTO 15 MG TABS tablet          Allergies  Allergies   Allergen Reactions     Dust Mites      Mold        Social History  Social History     Social History     Marital status:      Spouse name: Ludmila     Number of children: 2     Years of education: N/A     Occupational History      [employer]     Social History Main Topics     Smoking status: Never Smoker     Smokeless tobacco: Never Used     Alcohol use No     Drug use: No     Sexual activity: Yes     Partners: Female     Birth control/ protection: Condom     Other Topics Concern     Parent/Sibling W/ Cabg, Mi Or Angioplasty Before 65f 55m? No     Social History Narrative       Family History  Family History   Problem Relation Age of Onset     Heart Failure Mother 73     Cerebrovascular Disease Father 70     HEART DISEASE Father 91     Pacemaker     KIDNEY DISEASE Father      stones      No Known Problems Brother      No Known Problems Sister      Cancer - colorectal No family hx of      Prostate Cancer No family hx of      Diabetes No family hx of      Hypertension No family hx of            ROS/MED HX  The complete review of systems is negative other than noted in the HPI or here.   ENT/Pulmonary:  - neg pulmonary ROS     Neurologic:     (+)migraines, CVA date: 2014 x 2 without deficits    Cardiovascular: Comment: -History of MI in 2014  "- cath showed normal coronaries, MI thought to be related to the A-fib.      - AV canal defect/gerbode defect/ASD        (+) ----. : . . . :. dysrhythmias a-fib, .   METS/Exercise Tolerance:  >4 METS   Hematologic:  - neg hematologic  ROS       Musculoskeletal:  - neg musculoskeletal ROS       GI/Hepatic:  - neg GI/hepatic ROS       Renal/Genitourinary:     (+) BPH, urinary retention      Endo:  - neg endo ROS       Psychiatric:  - neg psychiatric ROS       Infectious Disease:  - neg infectious disease ROS       Malignancy:   (+) Malignancy History of Prostate  Prostate CA Active status post,         Other:    (+) no H/O Chronic Pain,             Temp: 97.3  F (36.3  C) Temp src: Oral BP: 162/90 Pulse: 58     SpO2: 98 %         169 lbs 3.2 oz  5' 9\"   Body mass index is 24.99 kg/(m^2).       Physical Exam  Constitutional: Awake, alert, cooperative, no apparent distress, and appears stated age.  Eyes: Pupils equal, round and reactive to light, extra ocular muscles intact, sclera clear, conjunctiva normal.  HENT: Normocephalic, oral pharynx with moist mucus membranes, good dentition. No goiter appreciated.   Respiratory: Clear to auscultation bilaterally, no crackles or wheezing.  Cardiovascular: Regular rate and rhythm, normal S1 and S2, and no murmur noted.  Carotids +2, no bruits. No edema. Palpable pulses to radial  DP and PT arteries.   GI: Normal bowel sounds, soft, non-distended, non-tender, no masses palpated, no hepatosplenomegaly.    Lymph/Hematologic: No cervical lymphadenopathy and no supraclavicular lymphadenopathy.  Genitourinary:  deferred  Skin: Warm and dry.    Musculoskeletal: Full ROM of neck. There is no redness, warmth, or swelling of the exposed joints. Gross motor strength is normal.    Neurologic: Awake, alert, oriented to name, place and time. Cranial nerves II-XII are grossly intact. Gait is normal.   Neuropsychiatric: Calm, cooperative. Normal affect.     Labs: (personally reviewed) "   Component      Latest Ref Rng & Units 9/7/2018   Sodium      133 - 144 mmol/L 140   Potassium      3.4 - 5.3 mmol/L 4.0   Chloride      94 - 109 mmol/L 104   Carbon Dioxide      20 - 32 mmol/L 30   Anion Gap      3 - 14 mmol/L 5   Glucose      70 - 99 mg/dL 83   Urea Nitrogen      7 - 30 mg/dL 17   Creatinine      0.66 - 1.25 mg/dL 0.98   GFR Estimate      >60 mL/min/1.7m2 76   GFR Estimate If Black      >60 mL/min/1.7m2 >90   Calcium      8.5 - 10.1 mg/dL 8.6   Bilirubin Total      0.2 - 1.3 mg/dL 0.9   Albumin      3.4 - 5.0 g/dL 4.2   Protein Total      6.8 - 8.8 g/dL 7.9   Alkaline Phosphatase      40 - 150 U/L 96   ALT      0 - 70 U/L 41   AST      0 - 45 U/L 25   Color Urine       Yellow   Appearance Urine       Slightly Cloudy   Glucose Urine      NEG:Negative mg/dL Negative   Bilirubin Urine      NEG:Negative Negative   Ketones Urine      NEG:Negative mg/dL Negative   Specific Gravity Urine      1.003 - 1.035 1.009   Blood Urine      NEG:Negative Negative   pH Urine      5.0 - 7.0 pH 5.0   Protein Albumin Urine      NEG:Negative mg/dL Negative   Urobilinogen mg/dL      0.0 - 2.0 mg/dL 0.0   Nitrite Urine      NEG:Negative Negative   Leukocyte Esterase Urine      NEG:Negative Moderate (A)   Source       Midstream Urine   WBC Urine      0 - 5 /HPF 62 (H)   RBC Urine      0 - 2 /HPF 7 (H)   Mucous Urine      NEG:Negative /LPF Present (A)   WBC      4.0 - 11.0 10e9/L 6.2   RBC Count      4.4 - 5.9 10e12/L 5.07   Hemoglobin      13.3 - 17.7 g/dL 15.6   Hematocrit      40.0 - 53.0 % 44.8   MCV      78 - 100 fl 88   MCH      26.5 - 33.0 pg 30.8   MCHC      31.5 - 36.5 g/dL 34.8   RDW      10.0 - 15.0 % 13.1   Platelet Count      150 - 450 10e9/L 156   ** urine culture pending and will be addressed by urology if indicated.      Echocardiogram  9/27/2017  Final Impressions:   1. Partial AV-canal defect with small septum primum ASD. There is also a defect/fenestration of the atrioventricular septum leading to LV-RA  [and also LA] communication consistent with Gerbode defect. The septal leaflet of the tricuspid valve and the anterior leaflet of the mitral valve insert at the same level. There is no VSD. These findings are better appreciated on prior Echo dated 9/2/16.   2. LVEF estimate 60-65%. Normal LV size. Moderate concentric LVH.   3. Normal RV size and global systolic function.   4. Mild mitral and mild aortic regurgitation.   5. Mild biatrial enlargement.   6. Normal PA systolic and RA pressure estimates.   7. Mildly dilated aortic root [3.8 cm].    Chamber Sizes and Function  Moderate concentric left ventricular hypertrophy. Left atrial size is mildly enlarged. Right ventricular cavity size is normal, global systolic RV function is normal. The right atrium is mildly enlarged. Right atrial volume index is 25 ml/m . The pulmonary artery is of normal size and origin. The sinus of Valsalva is dilated. The ascending aorta is normal sized. The aortic arch is normal.    Cardiac Cath  2014  Summary/Conclusions  PRESENTATION / INDICATIONS   Acute ST Elevation Myocardial Infarction   Chest Pain    DIAGNOSTIC SUMMARY   The LAD is widely patent with mild mid-stenosis   The Cx is widely patent   The RCA is widely patent (dominant)   New Afib    LEFT VENTRICULAR FUNCTION   Left ventricular ejection fraction based on LV Gram is 75%. LV   Pressure = 110/5.    SPECIAL PROCEDURES   Right femoral arteriotomy  was successfully closed utilizing a   closure device    RECOMMENDATIONS & PLAN   No culprit identified at angiography. Somewhat slow flow in   coronary arteries may be secondary to a-fib.      Pre-Operative Diagnosis / Indications   Chest Pain    Acute ST Elevation Myocardial Infarction         Outside records reviewed from: Care Everywhere      ASSESSMENT and PLAN  Andre Alas is a 68 year old male scheduled for Holmium Laser Enucleation Of The Prostate on 9/7/2018 by Dr. Oliveros in treatment of urinary retention in the setting of  prostate cancer.  PAC referral for risk assessment and optimization for anesthesia with comorbid conditions of: AV canal defect, gerbode defect, ASD, atrial fibrillation, old MI, history of CVA and BPH.    Pre-operative considerations:  1.  Cardiac:  Functional status- METS >4.  He exercises every day doing multiple strength exercises and walking 3 miles.  He was prescribed Xarelto for A-fib, but reports that he stopped taking it in the end of July after he continued to bleed after a cystoscopy procedure.  He didn't notify his cardiologist and reports he doesn't want to restart it or any other anticoagulant.  He reports that he feels much better off of the medication.  Discussed at length today and Dr. Day was able to convince the patient to restart his Xarelto due to high risk without it.  CHADSVasc = 4.  Instructed to stop Xarelto 48 hours prior to surgery.  He had a MI in 2014 diagnosed apparently based on elevated troponin levels as he was taken to the cath lab and he had no CAD.  It was noted that the MI was likely related to the new onset A-fib at that time.  He saw his cardiologist that is following the above listed cardiac defects and A-fib last on 9/27/2017.  They noted him as stable and recommended follow up with cardiac MRI in 1 year.  The VSD is not surgically repairable based on location.  He currently denies any concerning cardiac symptoms.  His last echo on 9/27/2018 showed an EF of 60-65%, the above defects, mild aortic regurgitation and LVH.  Intermediate risk surgery with 6.6% risk of major adverse cardiac event.   2.  Pulm:  Airway feasible.  JASWINDER risk: low.  3.  GI:  Risk of PONV score = 2.  If > 2, anti-emetic intervention recommended.  4. Neuro:  History of CVA x 2 in 2014.  Denies any residual effects.  5. :  Self caths twice per day in addition to normal urination for urinary retention.    VTE risk: 3%    Patient is optimized and is acceptable candidate for the proposed procedure.  No  further diagnostic evaluation is needed.     Patient also evaluated by Dr. Day.           Naomy Wilks, DNP, RN, APRN  Preoperative Assessment Center  [facility]  Clinic and Surgery Center  Phone: [phone]  Fax: [phone]

## 2018-09-07 NOTE — MR AVS SNAPSHOT
After Visit Summary   2018    Andre Alas    MRN: [id number]           Patient Information     Date Of Birth          1950        Visit Information        Provider Department      2018 3:00 PM Naomy Wilks APRN Counts include 234 beds at the [facility] Preoperative Assessment Center        Care Instructions    Preparing for Your Surgery      Name:  Andre Alas   MRN:  [id number]   :  1950   Today's Date:  2018     Arriving for surgery:  Surgery date:    Arrival time:  11:00 AM  Please come to:     [facility] [address]  [address] [place]  [address]    - parking is available in front of [facility] from 5:15AM to 8:00PM. If you prefer, park your car in the Green Lot.    -Proceed to the 3rd floor, check in at the Adult Surgery Waiting Lounge. [phone]    If an escort is needed stop at the Information Desk in the lobby. Inform the information person that you are here for surgery. An escort to the Adult Surgery Waiting Lounge will be provided.        What can I eat or drink?  -  You may have solid food or milk products until 8 hours prior to your surgery. 5 AM  -  You may have water, apple juice or 7up/Sprite until 2 hours prior to your surgery. 11 AM    Which medicines can I take?  Stop Aspirin, vitamins and supplements one week prior to surgery.  Hold Ibuprofen and Naproxen for 24 hours prior to surgery.     How do I prepare myself?  -  Take two showers: one the night before surgery; and one the morning of surgery.         Use Scrubcare or Hibiclens to wash from neck down.  You may use your own     shampoo and conditioner. No other hair products.   -  Do NOT use lotion, powder, deodorant, or antiperspirant the day of your surgery.  -  Do NOT wear a watch or jewelry.  - Do not bring your own medications to the hospital, except for inhalers and eye   drops.  -  Bring your ID and insurance  card.    Questions or Concerns:  -If you have questions or concerns regarding the day of surgery, please call [phone].     -For questions after surgery please call your surgeons office.           AFTER YOUR SURGERY  Breathing exercises   Breathing exercises help you recover faster. Take deep breaths and let the air out slowly. This will:     Help you wake up after surgery.    Help prevent complications like pneumonia.  Preventing complications will help you go home sooner.   Nausea and vomiting   You may feel sick to your stomach after surgery; if so, let your nurse know.    Pain control:  After surgery, you may have pain. Our goal is to help you manage your pain. Pain medicine will help you feel comfortable enough to do activities that will help you heal.  These activities may include breathing exercises, walking and physical therapy.   To help your health care team treat your pain we will ask: 1) If you have pain  2) where it is located 3) describe your pain in your words  Methods of pain control include medications given by mouth, vein or by nerve block for some surgeries.  We may give you a pain control pump that will:  1) Deliver the medicine through a tube placed in your vein  2) Control the amount of medicine you receive  3) Allow you to push a button to deliver a dose of pain medicine  Sequential Compression Device (SCD) or Pneumo Boots:  You may need to wear SCD S on your legs or feet. These are wraps connected to a machine that pumps in air and releases it. The repeated pumping helps prevent blood clots from forming.           Follow-ups after your visit        Your next 10 appointments already scheduled     Sep 27, 2018   Procedure with Henry Oliveros MD   [facility], [place], Same Day Surgery (--)    [address] [facility] [address] [phone]              Who to contact     Please call your clinic at [phone] to:    Ask questions about your health    Make or cancel  "appointments    Discuss your medicines    Learn about your test results    Speak to your doctor            Additional Information About Your Visit        Your Vitals Were     Pulse Temperature Height Pulse Oximetry BMI (Body Mass Index)       58 97.3  F (36.3  C) (Oral) 1.753 m (5' 9\") 98% 24.99 kg/m2        Blood Pressure from Last 3 Encounters:   09/07/18 162/90   07/20/18 (!) 129/94   07/10/18 136/78    Weight from Last 3 Encounters:   09/07/18 76.7 kg (169 lb 3.2 oz)   07/20/18 75.8 kg (167 lb)   07/10/18 75.9 kg (167 lb 6.4 oz)              Today, you had the following     No orders found for display       Primary Care Provider Office Phone # Fax #    Marybeth Gaffney [phone] [address] [facility] [address]        Equal Access to Services     [facility]: Hadii shamar milton hadasho Soricki, waaxda luqadaha, qaybta kaalmada adeegyada, clara pollard . So [facility] [phone].    ATENCIÓN: Si habla español, tiene a espino disposición servicios gratuitos de asistencia lingüística. [facility] [phone].    We comply with applicable federal civil rights laws and [place] laws. We do not discriminate on the basis of race, color, national origin, age, disability, sex, sexual orientation, or gender identity.            Thank you!     Thank you for choosing [facility] PREOPERATIVE ASSESSMENT CENTER  for your care. Our goal is always to provide you with excellent care. Hearing back from our patients is one way we can continue to improve our services. Please take a few minutes to complete the written survey that you may receive in the mail after your visit with us. Thank you!             Your Updated Medication List - Protect others around you: Learn how to safely use, store and throw away your medicines at www.disposemymeds.org.          This list is accurate as of 9/7/18  3:50 PM.  Always use your most recent med list.                   Brand Name Dispense Instructions " for use Diagnosis    ASPIRIN NOT PRESCRIBED    INTENTIONAL    0 each    Please choose reason not prescribed, below    Old myocardial infarction       rivaroxaban ANTICOAGULANT 20 MG Tabs tablet    XARELTO     Take 15 mg by mouth daily (with dinner)    PAF (paroxysmal atrial fibrillation) (H)       VITAMIN B COMPLEX PO      Takes total of 3 tablets every 2 weeks (divided)        VITAMIN C PO      Takes total of 3 tablets every 2 weeks (divided)        vitamin E 100 UNIT capsule    TOCOPHEROL     Takes total of 3 tablets every 2 weeks (divided)        Zinc Gluconate 100 MG Tabs      Takes total of 3 tablets every 2 weeks (divided)

## 2018-09-07 NOTE — PHARMACY - PREOPERATIVE ASSESSMENT CENTER
Anticoagulation Note - Preoperative Assessment Center (PAC) Pharmacist     Patient seen and interviewed during time of PAC Clinic appointment September 7, 2018.  The purpose of this note is to document the perioperative anticoagulation plan outlined by the providers caring for Andre Alas.     Current Regimen  Anticoagulation Regimen as of September 7, 2018: xarelto 15 mg PO at supper time. Currently on hold due to bleeding about a month ago. Patient may resume prior to surgery.   Indication: a fib   Prescriber:  Dr. Orourke (cardiologist)    Perioperative plan  Andre Alas is scheduled for holmium laser enucleation of prostate on 9/27/18 with Dr. Oliveros.  Patient is currently holding Xarelto, but may resume taking this between now and surgery. If he resumes, was instructed by PAC SERGIO/MD to hold x2 days before the procedure assuming normal renal function (labs to be drawn today).   Resumption of anticoagulation after procedure will be based on surgery team assessment of bleeding risks and complications.  This plan may require re-assessment and modification by his primary team in the perioperative setting depending on patients clinical situation.        Marcio Pink RPH  September 7, 2018  3:59 PM

## 2018-09-07 NOTE — LETTER
[facility]  [address] [place] [address]    September 10, 2018    Andre Alas  [address] [facility] [address]          Dear Andre,    Your results are normal    Enclosed is a copy of your results.     Results for orders placed or performed in visit on 09/07/18   Lipid panel reflex to direct LDL Fasting   Result Value Ref Range    Cholesterol 162 <200 mg/dL    Triglycerides 100 <150 mg/dL    HDL Cholesterol 52 >39 mg/dL    LDL Cholesterol Calculated 90 <100 mg/dL    Non HDL Cholesterol 110 <130 mg/dL   TSH with free T4 reflex   Result Value Ref Range    TSH 1.69 0.40 - 4.00 mU/L   Comprehensive metabolic panel   Result Value Ref Range    Sodium 140 133 - 144 mmol/L    Potassium 4.0 3.4 - 5.3 mmol/L    Chloride 104 94 - 109 mmol/L    Carbon Dioxide 30 20 - 32 mmol/L    Anion Gap 5 3 - 14 mmol/L    Glucose 83 70 - 99 mg/dL    Urea Nitrogen 17 7 - 30 mg/dL    Creatinine 0.98 0.66 - 1.25 mg/dL    GFR Estimate 76 >60 mL/min/1.7m2    GFR Estimate If Black >90 >60 mL/min/1.7m2    Calcium 8.6 8.5 - 10.1 mg/dL    Bilirubin Total 0.9 0.2 - 1.3 mg/dL    Albumin 4.2 3.4 - 5.0 g/dL    Protein Total 7.9 6.8 - 8.8 g/dL    Alkaline Phosphatase 96 40 - 150 U/L    ALT 41 0 - 70 U/L    AST 25 0 - 45 U/L   PSA tumor marker   Result Value Ref Range    PSA 13.80 (H) 0 - 4 ug/L   Routine UA with micro reflex to culture   Result Value Ref Range    Color Urine Yellow     Appearance Urine Slightly Cloudy     Glucose Urine Negative NEG^Negative mg/dL    Bilirubin Urine Negative NEG^Negative    Ketones Urine Negative NEG^Negative mg/dL    Specific Gravity Urine 1.009 1.003 - 1.035    Blood Urine Negative NEG^Negative    pH Urine 5.0 5.0 - 7.0 pH    Protein Albumin Urine Negative NEG^Negative mg/dL    Urobilinogen mg/dL 0.0 0.0 - 2.0 mg/dL    Nitrite Urine Negative NEG^Negative    Leukocyte Esterase Urine Moderate (A) NEG^Negative    Source  Midstream Urine     WBC Urine 62 (H) 0 - 5 /HPF    RBC Urine 7 (H) 0 - 2 /HPF    Mucous Urine Present (A) NEG^Negative /LPF   CBC with platelets   Result Value Ref Range    WBC 6.2 4.0 - 11.0 10e9/L    RBC Count 5.07 4.4 - 5.9 10e12/L    Hemoglobin 15.6 13.3 - 17.7 g/dL    Hematocrit 44.8 40.0 - 53.0 %    MCV 88 78 - 100 fl    MCH 30.8 26.5 - 33.0 pg    MCHC 34.8 31.5 - 36.5 g/dL    RDW 13.1 10.0 - 15.0 %    Platelet Count 156 150 - 450 10e9/L   Urine Culture Aerobic Bacterial   Result Value Ref Range    Specimen Description Midstream Urine     Special Requests Specimen received in preservative     Culture Micro (A)      >100,000 colonies/mL  Coagulase negative Staphylococcus         Susceptibility    Coagulase negative staphylococcus - HIPOLITO     CIPROFLOXACIN <=0.5 Sensitive ug/mL     GENTAMICIN <=0.5 Sensitive ug/mL     LEVOFLOXACIN 0.25 Sensitive ug/mL     NITROFURANTOIN 32 Sensitive ug/mL     OXACILLIN <=0.25 Sensitive ug/mL     PENICILLIN  Resistant ug/mL     TETRACYCLINE <=1 Sensitive ug/mL     VANCOMYCIN 1 Sensitive ug/mL     Trimethoprim/Sulfa  Sensitive ug/mL       If you have any questions or concerns, please call myself or my nurse at [phone].      Sincerely,        Marybeth Gaffney MD/ha

## 2018-09-10 LAB
BACTERIA SPEC CULT: ABNORMAL
Lab: ABNORMAL
SPECIMEN SOURCE: ABNORMAL

## 2018-09-12 DIAGNOSIS — R30.0 DYSURIA: Primary | ICD-10-CM

## 2018-09-12 RX ORDER — SULFAMETHOXAZOLE/TRIMETHOPRIM 800-160 MG
1 TABLET ORAL 2 TIMES DAILY
Qty: 14 TABLET | Refills: 0 | Status: ON HOLD | OUTPATIENT
Start: 2018-09-20 | End: 2018-09-27

## 2018-09-13 ENCOUNTER — CARE COORDINATION (OUTPATIENT)
Dept: UROLOGY | Facility: CLINIC | Age: 68
End: 2018-09-13

## 2018-09-13 NOTE — PROGRESS NOTES
Spoke with patients wife. Informed that Andre needs to start abx one week prior to his upcoming procedure with Dr. Oliveros on the 27th. She will  the medication and have him start taking on the 20th. Suellen Wilks RN

## 2018-09-26 ENCOUNTER — ANESTHESIA EVENT (OUTPATIENT)
Dept: SURGERY | Facility: CLINIC | Age: 68
End: 2018-09-26
Payer: COMMERCIAL

## 2018-09-26 ASSESSMENT — ENCOUNTER SYMPTOMS: DYSRHYTHMIAS: 1

## 2018-09-27 ENCOUNTER — SURGERY (OUTPATIENT)
Age: 68
End: 2018-09-27

## 2018-09-27 ENCOUNTER — ANESTHESIA (OUTPATIENT)
Dept: SURGERY | Facility: CLINIC | Age: 68
End: 2018-09-27
Payer: COMMERCIAL

## 2018-09-27 ENCOUNTER — HOSPITAL ENCOUNTER (OUTPATIENT)
Facility: CLINIC | Age: 68
Discharge: HOME | End: 2018-09-28
Attending: UROLOGY | Admitting: UROLOGY
Payer: COMMERCIAL

## 2018-09-27 DIAGNOSIS — R30.0 DYSURIA: ICD-10-CM

## 2018-09-27 PROBLEM — N40.0 BPH (BENIGN PROSTATIC HYPERPLASIA): Status: ACTIVE | Noted: 2018-09-27

## 2018-09-27 LAB — GLUCOSE BLDC GLUCOMTR-MCNC: 88 MG/DL (ref 70–99)

## 2018-09-27 PROCEDURE — 37000008 ZZH ANESTHESIA TECHNICAL FEE, 1ST 30 MIN: Performed by: UROLOGY

## 2018-09-27 PROCEDURE — 36000062 ZZH SURGERY LEVEL 4 1ST 30 MIN - UMMC: Performed by: UROLOGY

## 2018-09-27 PROCEDURE — 40000065 ZZH STATISTIC EKG NON-CHARGEABLE

## 2018-09-27 PROCEDURE — 25000566 ZZH SEVOFLURANE, EA 15 MIN: Performed by: UROLOGY

## 2018-09-27 PROCEDURE — C1758 CATHETER, URETERAL: HCPCS | Performed by: UROLOGY

## 2018-09-27 PROCEDURE — 37000009 ZZH ANESTHESIA TECHNICAL FEE, EACH ADDTL 15 MIN: Performed by: UROLOGY

## 2018-09-27 PROCEDURE — 93010 ELECTROCARDIOGRAM REPORT: CPT | Performed by: INTERNAL MEDICINE

## 2018-09-27 PROCEDURE — 25000125 ZZHC RX 250: Performed by: NURSE ANESTHETIST, CERTIFIED REGISTERED

## 2018-09-27 PROCEDURE — 71000014 ZZH RECOVERY PHASE 1 LEVEL 2 FIRST HR: Performed by: UROLOGY

## 2018-09-27 PROCEDURE — 25000128 H RX IP 250 OP 636: Performed by: NURSE ANESTHETIST, CERTIFIED REGISTERED

## 2018-09-27 PROCEDURE — 25800025 ZZH RX 258: Performed by: STUDENT IN AN ORGANIZED HEALTH CARE EDUCATION/TRAINING PROGRAM

## 2018-09-27 PROCEDURE — 40000170 ZZH STATISTIC PRE-PROCEDURE ASSESSMENT II: Performed by: UROLOGY

## 2018-09-27 PROCEDURE — 82962 GLUCOSE BLOOD TEST: CPT

## 2018-09-27 PROCEDURE — 71000027 ZZH RECOVERY PHASE 2 EACH 15 MINS: Performed by: UROLOGY

## 2018-09-27 PROCEDURE — 88305 TISSUE EXAM BY PATHOLOGIST: CPT | Mod: 26 | Performed by: UROLOGY

## 2018-09-27 PROCEDURE — 25000128 H RX IP 250 OP 636: Performed by: STUDENT IN AN ORGANIZED HEALTH CARE EDUCATION/TRAINING PROGRAM

## 2018-09-27 PROCEDURE — 36000064 ZZH SURGERY LEVEL 4 EA 15 ADDTL MIN - UMMC: Performed by: UROLOGY

## 2018-09-27 PROCEDURE — 25000128 H RX IP 250 OP 636: Performed by: UROLOGY

## 2018-09-27 PROCEDURE — 27210794 ZZH OR GENERAL SUPPLY STERILE: Performed by: UROLOGY

## 2018-09-27 PROCEDURE — 88305 TISSUE EXAM BY PATHOLOGIST: CPT | Performed by: UROLOGY

## 2018-09-27 RX ORDER — SODIUM CHLORIDE 9 MG/ML
INJECTION, SOLUTION INTRAVENOUS CONTINUOUS
Status: DISCONTINUED | OUTPATIENT
Start: 2018-09-27 | End: 2018-09-28 | Stop reason: HOSPADM

## 2018-09-27 RX ORDER — AMPICILLIN 2 G/1
2 INJECTION, POWDER, FOR SOLUTION INTRAVENOUS EVERY 6 HOURS
Status: DISCONTINUED | OUTPATIENT
Start: 2018-09-27 | End: 2018-09-27 | Stop reason: HOSPADM

## 2018-09-27 RX ORDER — HYDROMORPHONE HYDROCHLORIDE 1 MG/ML
.3-.5 INJECTION, SOLUTION INTRAMUSCULAR; INTRAVENOUS; SUBCUTANEOUS EVERY 10 MIN PRN
Status: DISCONTINUED | OUTPATIENT
Start: 2018-09-27 | End: 2018-09-27

## 2018-09-27 RX ORDER — LIDOCAINE 40 MG/G
CREAM TOPICAL
Status: DISCONTINUED | OUTPATIENT
Start: 2018-09-27 | End: 2018-09-28 | Stop reason: HOSPADM

## 2018-09-27 RX ORDER — NALOXONE HYDROCHLORIDE 0.4 MG/ML
.1-.4 INJECTION, SOLUTION INTRAMUSCULAR; INTRAVENOUS; SUBCUTANEOUS
Status: DISCONTINUED | OUTPATIENT
Start: 2018-09-27 | End: 2018-09-28 | Stop reason: HOSPADM

## 2018-09-27 RX ORDER — ONDANSETRON 2 MG/ML
INJECTION INTRAMUSCULAR; INTRAVENOUS PRN
Status: DISCONTINUED | OUTPATIENT
Start: 2018-09-27 | End: 2018-09-27

## 2018-09-27 RX ORDER — LIDOCAINE HYDROCHLORIDE 20 MG/ML
INJECTION, SOLUTION INFILTRATION; PERINEURAL PRN
Status: DISCONTINUED | OUTPATIENT
Start: 2018-09-27 | End: 2018-09-27

## 2018-09-27 RX ORDER — LABETALOL HYDROCHLORIDE 5 MG/ML
10 INJECTION, SOLUTION INTRAVENOUS
Status: DISCONTINUED | OUTPATIENT
Start: 2018-09-27 | End: 2018-09-27 | Stop reason: HOSPADM

## 2018-09-27 RX ORDER — SULFAMETHOXAZOLE/TRIMETHOPRIM 800-160 MG
1 TABLET ORAL 2 TIMES DAILY
Qty: 14 TABLET | Refills: 0 | Status: SHIPPED | OUTPATIENT
Start: 2018-09-27 | End: 2018-09-28

## 2018-09-27 RX ORDER — ONDANSETRON 2 MG/ML
4 INJECTION INTRAMUSCULAR; INTRAVENOUS EVERY 6 HOURS PRN
Status: DISCONTINUED | OUTPATIENT
Start: 2018-09-27 | End: 2018-09-28 | Stop reason: HOSPADM

## 2018-09-27 RX ORDER — OXYCODONE HYDROCHLORIDE 5 MG/1
5 TABLET ORAL EVERY 4 HOURS PRN
Status: DISCONTINUED | OUTPATIENT
Start: 2018-09-27 | End: 2018-09-27

## 2018-09-27 RX ORDER — DEXAMETHASONE SODIUM PHOSPHATE 4 MG/ML
INJECTION, SOLUTION INTRA-ARTICULAR; INTRALESIONAL; INTRAMUSCULAR; INTRAVENOUS; SOFT TISSUE PRN
Status: DISCONTINUED | OUTPATIENT
Start: 2018-09-27 | End: 2018-09-27

## 2018-09-27 RX ORDER — ONDANSETRON 2 MG/ML
4 INJECTION INTRAMUSCULAR; INTRAVENOUS EVERY 30 MIN PRN
Status: DISCONTINUED | OUTPATIENT
Start: 2018-09-27 | End: 2018-09-27

## 2018-09-27 RX ORDER — OXYCODONE HYDROCHLORIDE 5 MG/1
5-10 TABLET ORAL
Status: DISCONTINUED | OUTPATIENT
Start: 2018-09-27 | End: 2018-09-28 | Stop reason: HOSPADM

## 2018-09-27 RX ORDER — ACETAMINOPHEN 325 MG/1
650 TABLET ORAL EVERY 4 HOURS PRN
Status: DISCONTINUED | OUTPATIENT
Start: 2018-09-27 | End: 2018-09-28 | Stop reason: HOSPADM

## 2018-09-27 RX ORDER — FENTANYL CITRATE 50 UG/ML
25-50 INJECTION, SOLUTION INTRAMUSCULAR; INTRAVENOUS
Status: DISCONTINUED | OUTPATIENT
Start: 2018-09-27 | End: 2018-09-27 | Stop reason: HOSPADM

## 2018-09-27 RX ORDER — HYDROMORPHONE HCL/0.9% NACL/PF 0.2MG/0.2
0.2 SYRINGE (ML) INTRAVENOUS
Status: DISCONTINUED | OUTPATIENT
Start: 2018-09-27 | End: 2018-09-28 | Stop reason: HOSPADM

## 2018-09-27 RX ORDER — EPHEDRINE SULFATE 50 MG/ML
INJECTION, SOLUTION INTRAMUSCULAR; INTRAVENOUS; SUBCUTANEOUS PRN
Status: DISCONTINUED | OUTPATIENT
Start: 2018-09-27 | End: 2018-09-27

## 2018-09-27 RX ORDER — ONDANSETRON 4 MG/1
4 TABLET, ORALLY DISINTEGRATING ORAL EVERY 6 HOURS PRN
Status: DISCONTINUED | OUTPATIENT
Start: 2018-09-27 | End: 2018-09-28 | Stop reason: HOSPADM

## 2018-09-27 RX ORDER — FENTANYL CITRATE 50 UG/ML
INJECTION, SOLUTION INTRAMUSCULAR; INTRAVENOUS PRN
Status: DISCONTINUED | OUTPATIENT
Start: 2018-09-27 | End: 2018-09-27

## 2018-09-27 RX ORDER — LIDOCAINE 40 MG/G
CREAM TOPICAL
Status: DISCONTINUED | OUTPATIENT
Start: 2018-09-27 | End: 2018-09-27 | Stop reason: HOSPADM

## 2018-09-27 RX ORDER — PROPOFOL 10 MG/ML
INJECTION, EMULSION INTRAVENOUS PRN
Status: DISCONTINUED | OUTPATIENT
Start: 2018-09-27 | End: 2018-09-27

## 2018-09-27 RX ORDER — MEPERIDINE HYDROCHLORIDE 25 MG/ML
12.5 INJECTION INTRAMUSCULAR; INTRAVENOUS; SUBCUTANEOUS
Status: DISCONTINUED | OUTPATIENT
Start: 2018-09-27 | End: 2018-09-27

## 2018-09-27 RX ORDER — ONDANSETRON 4 MG/1
4 TABLET, ORALLY DISINTEGRATING ORAL EVERY 30 MIN PRN
Status: DISCONTINUED | OUTPATIENT
Start: 2018-09-27 | End: 2018-09-27

## 2018-09-27 RX ORDER — SODIUM CHLORIDE, SODIUM LACTATE, POTASSIUM CHLORIDE, CALCIUM CHLORIDE 600; 310; 30; 20 MG/100ML; MG/100ML; MG/100ML; MG/100ML
INJECTION, SOLUTION INTRAVENOUS CONTINUOUS
Status: DISCONTINUED | OUTPATIENT
Start: 2018-09-27 | End: 2018-09-27

## 2018-09-27 RX ORDER — SODIUM CHLORIDE, SODIUM LACTATE, POTASSIUM CHLORIDE, CALCIUM CHLORIDE 600; 310; 30; 20 MG/100ML; MG/100ML; MG/100ML; MG/100ML
INJECTION, SOLUTION INTRAVENOUS CONTINUOUS
Status: DISCONTINUED | OUTPATIENT
Start: 2018-09-27 | End: 2018-09-27 | Stop reason: HOSPADM

## 2018-09-27 RX ORDER — NALOXONE HYDROCHLORIDE 0.4 MG/ML
.1-.4 INJECTION, SOLUTION INTRAMUSCULAR; INTRAVENOUS; SUBCUTANEOUS
Status: DISCONTINUED | OUTPATIENT
Start: 2018-09-27 | End: 2018-09-27

## 2018-09-27 RX ADMIN — SODIUM CHLORIDE, POTASSIUM CHLORIDE, SODIUM LACTATE AND CALCIUM CHLORIDE: 600; 310; 30; 20 INJECTION, SOLUTION INTRAVENOUS at 18:04

## 2018-09-27 RX ADMIN — FENTANYL CITRATE 50 MCG: 50 INJECTION, SOLUTION INTRAMUSCULAR; INTRAVENOUS at 18:41

## 2018-09-27 RX ADMIN — ONDANSETRON 4 MG: 2 INJECTION INTRAMUSCULAR; INTRAVENOUS at 19:17

## 2018-09-27 RX ADMIN — SUGAMMADEX 150 MG: 100 INJECTION, SOLUTION INTRAVENOUS at 19:22

## 2018-09-27 RX ADMIN — GENTAMICIN SULFATE 310 MG: 40 INJECTION, SOLUTION INTRAMUSCULAR; INTRAVENOUS at 18:35

## 2018-09-27 RX ADMIN — PROPOFOL 200 MG: 10 INJECTION, EMULSION INTRAVENOUS at 18:21

## 2018-09-27 RX ADMIN — FENTANYL CITRATE 50 MCG: 50 INJECTION, SOLUTION INTRAMUSCULAR; INTRAVENOUS at 18:30

## 2018-09-27 RX ADMIN — Medication 5 MG: at 19:13

## 2018-09-27 RX ADMIN — FENTANYL CITRATE 25 MCG: 50 INJECTION, SOLUTION INTRAMUSCULAR; INTRAVENOUS at 19:26

## 2018-09-27 RX ADMIN — FENTANYL CITRATE 50 MCG: 50 INJECTION, SOLUTION INTRAMUSCULAR; INTRAVENOUS at 18:21

## 2018-09-27 RX ADMIN — FENTANYL CITRATE 25 MCG: 50 INJECTION, SOLUTION INTRAMUSCULAR; INTRAVENOUS at 19:30

## 2018-09-27 RX ADMIN — AMPICILLIN SODIUM 2 G: 2 INJECTION, POWDER, FOR SOLUTION INTRAMUSCULAR; INTRAVENOUS at 18:30

## 2018-09-27 RX ADMIN — LIDOCAINE HYDROCHLORIDE 60 MG: 20 INJECTION, SOLUTION INFILTRATION; PERINEURAL at 18:21

## 2018-09-27 RX ADMIN — ROCURONIUM BROMIDE 30 MG: 10 INJECTION INTRAVENOUS at 18:25

## 2018-09-27 RX ADMIN — PROPOFOL 40 MG: 10 INJECTION, EMULSION INTRAVENOUS at 18:27

## 2018-09-27 RX ADMIN — SODIUM CHLORIDE 3000 ML: 900 IRRIGANT IRRIGATION at 23:14

## 2018-09-27 RX ADMIN — DEXAMETHASONE SODIUM PHOSPHATE 8 MG: 4 INJECTION, SOLUTION INTRAMUSCULAR; INTRAVENOUS at 18:41

## 2018-09-27 RX ADMIN — PHENYLEPHRINE HYDROCHLORIDE 50 MCG: 10 INJECTION, SOLUTION INTRAMUSCULAR; INTRAVENOUS; SUBCUTANEOUS at 18:58

## 2018-09-27 RX ADMIN — MIDAZOLAM 2 MG: 1 INJECTION INTRAMUSCULAR; INTRAVENOUS at 18:04

## 2018-09-27 RX ADMIN — PHENYLEPHRINE HYDROCHLORIDE 100 MCG: 10 INJECTION, SOLUTION INTRAMUSCULAR; INTRAVENOUS; SUBCUTANEOUS at 19:03

## 2018-09-27 RX ADMIN — SODIUM CHLORIDE: 9 INJECTION, SOLUTION INTRAVENOUS at 23:09

## 2018-09-27 ASSESSMENT — ACTIVITIES OF DAILY LIVING (ADL)
FALL_HISTORY_WITHIN_LAST_SIX_MONTHS: NO
TOILETING: 0-->INDEPENDENT
DRESS: 0-->INDEPENDENT
AMBULATION: 0 - INDEPENDENT
RETIRED_COMMUNICATION: 0-->UNDERSTANDS/COMMUNICATES WITHOUT DIFFICULTY
AMBULATION: 0-->INDEPENDENT
SWALLOWING: 0-->SWALLOWS FOODS/LIQUIDS WITHOUT DIFFICULTY
TOILETING: 0 - INDEPENDENT
TRANSFERRING: 0 - INDEPENDENT
EATING: 0 - INDEPENDENT
DRESS: 0 - INDEPENDENT
COGNITION: 0 - NO COGNITION ISSUES REPORTED
BATHING: 0 - INDEPENDENT
TRANSFERRING: 0-->INDEPENDENT
BATHING: 0-->INDEPENDENT
SWALLOWING: 0 - SWALLOWS FOODS/LIQUIDS WITHOUT DIFFICULTY
RETIRED_EATING: 0-->INDEPENDENT
COMMUNICATION: 0 - UNDERSTANDS/COMMUNICATES WITHOUT DIFFICULTY

## 2018-09-27 NOTE — IP AVS SNAPSHOT
UR [address]A    [address]    [facility] [address]    Phone:  [phone]                                       After Visit Summary   9/27/2018    Andre Alas    MRN: [id number]           After Visit Summary Signature Page     I have received my discharge instructions, and my questions have been answered. I have discussed any challenges I see with this plan with the nurse or doctor.    ..........................................................................................................................................  Patient/Patient Representative Signature      ..........................................................................................................................................  Patient Representative Print Name and Relationship to Patient    ..................................................               ................................................  Date                                   Time    ..........................................................................................................................................  Reviewed by Signature/Title    ...................................................              ..............................................  Date                                               Time          22EPIC Rev 08/18

## 2018-09-27 NOTE — DISCHARGE INSTRUCTIONS
Discharge Instructions For Laser Prostatectomy    You had a procedure called a laser prostatectomy.  During this procedure, all or part of your prostate gland was removed to relieve urinary problems due to prostate enlargement. Here are some instructions to help you care for yourself at home.   What to expect:    Your prostate will likely be sore at first. This will improve as you heal.    You may be sent home with a catheter to drain urine from your bladder. If so, you may wear a leg bag for a week or so.     You may notice some blood in your urine. Don t be alarmed, this is normal after surgery and while the catheter is in place.   Activity:    Limit physical activity for the first week after surgery. This will allow your body time to rest and heal.     Ask your doctor before resuming your normal activities.     Avoid long car rides. Don t drive until your doctor says it s okay. This is usually after your catheter is removed (if you have one) and you are no longer taking pain medication.     Don t lift anything heavier than 10 pounds until your doctor says it s okay.     Avoid climbing stairs or strenuous exercise.   Home Care:    Care for your catheter as instructed. Wash the catheter daily with plain soap and water to avoid infection.     Return to a normal diet.     Drink plenty of fluids during the day (enough to keep your urine light colored). This will help keep urine flowing freely.    Shower as usual.    Wear loose fitting sweatpants while the catheter is in place. Sweatpants are more comfortable than other pant styles.   Call the doctor if you have:    Shortness of breath (call 911)    Fever of 100.4 degrees F or higher, shaking, or chills    Hives or skin rash    Nausea, vomiting, or diarrhea    Catheter falls out or stops draining    Foul smelling discharge from your catheter or urethra (at tip of penis)  Follow-up:  Follow-up with your doctor as scheduled. If an appointment has not already been made,  call to office to set one up.   Information from [employer] [facility] [facility]    Same-Day Surgery   Adult Discharge Orders & Instructions     For 24 hours after surgery:  1. Get plenty of rest.  A responsible adult must stay with you for at least 24 hours after you leave the hospital.   2. Pain medication can slow your reflexes. Do not drive or use heavy equipment.  If you have weakness or tingling, don't drive or use heavy equipment until this feeling goes away.  3. Mixing alcohol and pain medication can cause dizziness and slow your breathing. It can even be fatal. Do not drink alcohol while taking pain medication.  4. Avoid strenuous or risky activities.  Ask for help when climbing stairs.   5. You may feel lightheaded.  If so, sit for a few minutes before standing.  Have someone help you get up.   6. If you have nausea (feel sick to your stomach), drink only clear liquids such as apple juice, ginger ale, broth or 7-Up.  Rest may also help.  Be sure to drink enough fluids.  Move to a regular diet as you feel able. Take pain medications with a small amount of solid food, such as toast or crackers, to avoid nausea.   7. A slight fever is normal. Call the doctor if your fever is over 100 F (37.7 C) (taken under the tongue) or lasts longer than 24 hours.  8. You may have a dry mouth, muscle aches, trouble sleeping or a sore throat.  These symptoms should go away after 24 hours.  9. Do not make important or legal decisions.   Pain Management:      1. Take pain medication (if prescribed) for pain as directed by your physician.        2. WARNING: If the pain medication you have been prescribed contains Tylenol (acetaminophen), DO NOT take additional doses of Tylenol (acetaminophen).     Call your doctor for any of the followin.  Signs of infection (fever, growing tenderness at the surgery site, severe pain, a large amount of drainage or bleeding, foul-smelling drainage, redness, swelling).    2.  It has been  over 8 to 10 hours since surgery and you are still not able to urinate (pee).    3.  Headache for over 24 hours.    4.  Numbness, tingling or weakness the day after surgery (if you had spinal anesthesia).  To contact a doctor, call ____Dr. Oliveros _[phone]__ or:      [phone] and ask for the Resident On Call for:__Urology___ (answered 24 hours a day)      Emergency Department:  [facility]: [phone]  [facility]: [phone]               Rev. 10/2014     CARE OF INDWELLING MERINO CATHETER  Cleanliness is VERY important!  1. Wash well around catheter with soap and water and rinse well.  Do this every morning and before bedtime.  2. Empty leg bag or bed bag into toilet whenever it becomes half full.  3. When disconnecting and reconnecting, wipe both the catheter end and tubing tip with alcohol. (You may use commercially prepared alcohol wipes or regular cotton balls soaked in alcohol.)  4. Rinse leg bag and bed bag inside and out after each use. You can soak leg bag and/or bed bag in two to three ounces of white vinegar when not in use. Rinse thoroughly before reconnecting to catheter.  5. You may clamp the catheter for short periods of time (two to three hours) if you are not uncomfortable. If planning intercourse: clamp catheter, disconnect from bag and   a) Tape catheter along shaft of penis, if male; or  b) Tape catheter to abdomen, if female  6. Drink lots of fluids (at least eight to ten cups/glasses per day) and take two to four grams of Vitamin C (optional) per day.      7. Watch for sign of catheter-associated urinary tract infection which include:      Cloudy urine, sediment in urine (may look like sand particles or white                           flakes), foul smelling urine    A burning feeling, pressure or pain in your lower abdomen    A burning feeling in the urethra or genitalia    Aching in the back (by the kidney)    Nausea and VomitingNausea and  VomitingAt the time of discharge from the hospital, you have a {[id number]} Camacho catheter with a  5 cc balloon. It was inserted on September 27, 2018 and should be changed one month from that date on 10/27/18.   Rev. 4/2014

## 2018-09-27 NOTE — IP AVS SNAPSHOT
MRN:[id number]                      After Visit Summary   9/27/2018    Andre Alas    MRN: [id number]           Thank you!     Thank you for choosing [facility] for your care. Our goal is always to provide you with excellent care. Hearing back from our patients is one way we can continue to improve our services. Please take a few minutes to complete the written survey that you may receive in the mail after you visit with us. Thank you!        Patient Information     Date Of Birth          1950        Designated Caregiver       Most Recent Value    Caregiver    Will someone help with your care after discharge? yes    Name of designated caregiver wife    Phone number of caregiver [phone]    Caregiver address [address] [facility] [address]      About your hospital stay     You were admitted on:  September 27, 2018 You last received care in the:  [facility]    You were discharged on:  September 28, 2018       Who to Call     For medical emergencies, please call 911.  For non-urgent questions about your medical care, please call your primary care provider or clinic, [phone]  For questions related to your surgery, please call your surgery clinic        Attending Provider     Provider Specialty    Henry Oliveros MD Urology       Primary Care Provider Office Phone # Fax #    Marybeth Gaffney [phone]      After Care Instructions     Discharge Instructions       Activity  - No strenuous exercise for 6 weeks.  - No lifting, pushing, pulling more than 10 pounds for 6 weeks.   - Do not strain with bowel movements.  - Do not drive until you can press the brake pedal quickly and fully without pain.   - Do not operate a motor vehicle while taking narcotic pain medications.     Urination  - Some light redness (up to a watermelon-like-pink in color) is expected in the upcoming 7-10days.   - If having hematuria (blood in the urine), make sure to increase your water intake  "and monitor for improvement  - If you are unable to urinate you should return urgently to the ED or call clinic to try to arrange for an urgent visit same day.   - You are going home with the following tubes or drains: nava catheter.  Nurse/ to write care and instructions.  Treat the catheter like an extension of your body; do not let it get caught or snagged on anything.  Leave the catheter in place until your follow up. Call the numbers listed above for any concerns.   - Catheter to be removed in clinic on 10/1/18    Medications  - May resume Xarelto in 5 days   - Transition from narcotic pain medications to tylenol (acetaminophen) as you are able.  Wean yourself off all pain medications as you are able.  - Some pain medications contain both tylenol (acetaminophen) and a narcotic (Norco, vicodin, percocet), do not take more than 4,000mg of Tylenol (acetaminophen) from all sources in any 24 hour period.  - Narcotics can make you constipated.  Take over the counter fiber (metamucil or benefiber) and stool softeners (miralax, docusate or senna) while taking narcotic pain medications, but stop if you develop diarrhea.  - No driving or operating machinery while taking narcotic pain medications     Follow-Up:  - Call your primary care provider to touch base regarding your recent admission.    - Call or return sooner than your regularly scheduled visit if you develop any of the following: fever (greater than 101.5), uncontrolled pain, uncontrolled nausea or vomiting, as well as increased redness, swelling, or drainage from your wound.     Phone numbers:   - Monday through Friday 8am to 4:30pm: Call [phone] with questions or to schedule or confirm appointment.    - Nights or weekends: call the after hours emergency pager - [phone] and tell the  \"I would like to page the Urology Resident on call.\"  - For emergencies, call [phone]                  Further instructions from your care team     "   Discharge Instructions For Laser Prostatectomy    You had a procedure called a laser prostatectomy.  During this procedure, all or part of your prostate gland was removed to relieve urinary problems due to prostate enlargement. Here are some instructions to help you care for yourself at home.   What to expect:    Your prostate will likely be sore at first. This will improve as you heal.    You may be sent home with a catheter to drain urine from your bladder. If so, you may wear a leg bag for a week or so.     You may notice some blood in your urine. Don t be alarmed, this is normal after surgery and while the catheter is in place.   Activity:    Limit physical activity for the first week after surgery. This will allow your body time to rest and heal.     Ask your doctor before resuming your normal activities.     Avoid long car rides. Don t drive until your doctor says it s okay. This is usually after your catheter is removed (if you have one) and you are no longer taking pain medication.     Don t lift anything heavier than 10 pounds until your doctor says it s okay.     Avoid climbing stairs or strenuous exercise.   Home Care:    Care for your catheter as instructed. Wash the catheter daily with plain soap and water to avoid infection.     Return to a normal diet.     Drink plenty of fluids during the day (enough to keep your urine light colored). This will help keep urine flowing freely.    Shower as usual.    Wear loose fitting sweatpants while the catheter is in place. Sweatpants are more comfortable than other pant styles.   Call the doctor if you have:    Shortness of breath (call 911)    Fever of 100.4 degrees F or higher, shaking, or chills    Hives or skin rash    Nausea, vomiting, or diarrhea    Catheter falls out or stops draining    Foul smelling discharge from your catheter or urethra (at tip of penis)  Follow-up:  Follow-up with your doctor as scheduled. If an appointment has not already been made,  call to office to set one up.   Information from [employer] Patient Education    Same-Day Surgery   Adult Discharge Orders & Instructions     For 24 hours after surgery:  1. Get plenty of rest.  A responsible adult must stay with you for at least 24 hours after you leave the hospital.   2. Pain medication can slow your reflexes. Do not drive or use heavy equipment.  If you have weakness or tingling, don't drive or use heavy equipment until this feeling goes away.  3. Mixing alcohol and pain medication can cause dizziness and slow your breathing. It can even be fatal. Do not drink alcohol while taking pain medication.  4. Avoid strenuous or risky activities.  Ask for help when climbing stairs.   5. You may feel lightheaded.  If so, sit for a few minutes before standing.  Have someone help you get up.   6. If you have nausea (feel sick to your stomach), drink only clear liquids such as apple juice, ginger ale, broth or 7-Up.  Rest may also help.  Be sure to drink enough fluids.  Move to a regular diet as you feel able. Take pain medications with a small amount of solid food, such as toast or crackers, to avoid nausea.   7. A slight fever is normal. Call the doctor if your fever is over 100 F (37.7 C) (taken under the tongue) or lasts longer than 24 hours.  8. You may have a dry mouth, muscle aches, trouble sleeping or a sore throat.  These symptoms should go away after 24 hours.  9. Do not make important or legal decisions.   Pain Management:      1. Take pain medication (if prescribed) for pain as directed by your physician.        2. WARNING: If the pain medication you have been prescribed contains Tylenol (acetaminophen), DO NOT take additional doses of Tylenol (acetaminophen).     Call your doctor for any of the followin.  Signs of infection (fever, growing tenderness at the surgery site, severe pain, a large amount of drainage or bleeding, foul-smelling drainage, redness, swelling).    2.  It has been  over 8 to 10 hours since surgery and you are still not able to urinate (pee).    3.  Headache for over 24 hours.    4.  Numbness, tingling or weakness the day after surgery (if you had spinal anesthesia).  To contact a doctor, call ____Dr. Oliveros _[phone]__ or:      [phone] and ask for the Resident On Call for:__Urology___ (answered 24 hours a day)      Emergency Department:  [facility]: [phone]  [facility]: [phone]               Rev. 10/2014     CARE OF INDWELLING MERINO CATHETER  Cleanliness is VERY important!  1. Wash well around catheter with soap and water and rinse well.  Do this every morning and before bedtime.  2. Empty leg bag or bed bag into toilet whenever it becomes half full.  3. When disconnecting and reconnecting, wipe both the catheter end and tubing tip with alcohol. (You may use commercially prepared alcohol wipes or regular cotton balls soaked in alcohol.)  4. Rinse leg bag and bed bag inside and out after each use. You can soak leg bag and/or bed bag in two to three ounces of white vinegar when not in use. Rinse thoroughly before reconnecting to catheter.  5. You may clamp the catheter for short periods of time (two to three hours) if you are not uncomfortable. If planning intercourse: clamp catheter, disconnect from bag and   a) Tape catheter along shaft of penis, if male; or  b) Tape catheter to abdomen, if female  6. Drink lots of fluids (at least eight to ten cups/glasses per day) and take two to four grams of Vitamin C (optional) per day.      7. Watch for sign of catheter-associated urinary tract infection which include:      Cloudy urine, sediment in urine (may look like sand particles or white                           flakes), foul smelling urine    A burning feeling, pressure or pain in your lower abdomen    A burning feeling in the urethra or genitalia    Aching in the back (by the kidney)    Nausea and VomitingNausea and  "VomitingAt the time of discharge from the hospital, you have a {NUMBERS:242166} Camacho catheter with a  5 cc balloon. It was inserted on September 27, 2018 and should be changed one month from that date on 10/27/18.   Rev. 4/2014      Pending Results     Date and Time Order Name Status Description    9/27/2018 1926 Surgical pathology exam In process             Statement of Approval     Ordered          09/28/18 1611  I have reviewed and agree with all the recommendations and orders detailed in this document.  EFFECTIVE NOW     Approved and electronically signed by:  Akil Ray MD             Admission Information     Date & Time Provider Department Dept. Phone    9/27/2018 Henry Oliveros MD [facility] [phone]      Your Vitals Were     Blood Pressure Pulse Temperature Respirations Height Weight    122/61 72 96.4  F (35.8  C) 18 1.753 m (5' 9\") 74.7 kg (164 lb 10.9 oz)    Pulse Oximetry BMI (Body Mass Index)                94% 24.32 kg/m2          Equal Access to Services     [facility]: Hadii shamar ku hadasho Soomaali, waaxda luqadaha, qaybta kaalmada adeegyada, waxay arik pollard . So [facility] [phone].    ATENCIÓN: Si habla español, tiene a espino disposición servicios gratuitos de asistencia lingüística. Llame al [phone].    We comply with applicable federal civil rights laws and [place] laws. We do not discriminate on the basis of race, color, national origin, age, disability, sex, sexual orientation, or gender identity.               Review of your medicines      CONTINUE these medicines which may have CHANGED, or have new prescriptions. If we are uncertain of the size of tablets/capsules you have at home, strength may be listed as something that might have changed.        Dose / Directions    sulfamethoxazole-trimethoprim 800-160 MG per tablet   Commonly known as:  BACTRIM DS/SEPTRA DS   This may have changed:  additional instructions   Used for:  Dysuria        Dose:  " 1 tablet   Take 1 tablet by mouth 2 times daily for 7 days Please complete one week course following surgery   Quantity:  14 tablet   Refills:  0         CONTINUE these medicines which have NOT CHANGED        Dose / Directions    ASPIRIN NOT PRESCRIBED   Commonly known as:  INTENTIONAL   Used for:  Old myocardial infarction        Please choose reason not prescribed, below   Quantity:  0 each   Refills:  0       rivaroxaban ANTICOAGULANT 20 MG Tabs tablet   Commonly known as:  XARELTO   Used for:  PAF (paroxysmal atrial fibrillation) (H)        Dose:  15 mg   Take 15 mg by mouth daily (with dinner)   Refills:  0       VITAMIN B COMPLEX PO        Takes total of 3 tablets every 2 weeks (divided)   Refills:  0       VITAMIN C PO        Takes total of 3 tablets every 2 weeks (divided)   Refills:  0       vitamin E 100 UNIT capsule   Commonly known as:  TOCOPHEROL        Takes total of 3 tablets every 2 weeks (divided)   Refills:  0       Zinc Gluconate 100 MG Tabs        Takes total of 3 tablets every 2 weeks (divided)   Refills:  0            Where to get your medicines      These medications were sent to [facility] [place] [facility] [address]  [address] [address]     Phone:  [phone]     sulfamethoxazole-trimethoprim 800-160 MG per tablet                Protect others around you: Learn how to safely use, store and throw away your medicines at www.disposemymeds.org.        ANTIBIOTIC INSTRUCTION     You've Been Prescribed an Antibiotic - Now What?  Your healthcare team thinks that you or your loved one might have an infection. Some infections can be treated with antibiotics, which are powerful, life-saving drugs. Like all medications, antibiotics have side effects and should only be used when necessary. There are some important things you should know about your antibiotic treatment.      Your healthcare team may run tests before you start taking an  antibiotic.    Your team may take samples (e.g., from your blood, urine or other areas) to run tests to look for bacteria. These test can be important to determine if you need an antibiotic at all and, if you do, which antibiotic will work best.      Within a few days, your healthcare team might change or even stop your antibiotic.    Your team may start you on an antibiotic while they are working to find out what is making you sick.    Your team might change your antibiotic because test results show that a different antibiotic would be better to treat your infection.    In some cases, once your team has more information, they learn that you do not need an antibiotic at all. They may find out that you don't have an infection, or that the antibiotic you're taking won't work against your infection. For example, an infection caused by a virus can't be treated with antibiotics. Staying on an antibiotic when you don't need it is more likely to be harmful than helpful.      You may experience side effects from your antibiotic.    Like all medications, antibiotics have side effects. Some of these can be serious.    Let you healthcare team know if you have any known allergies when you are admitted to the hospital.    One significant side effect of nearly all antibiotics is the risk of severe and sometimes deadly diarrhea caused by Clostridium difficile (C. Difficile). This occurs when a person takes antibiotics because some good germs are destroyed. Antibiotic use allows C. diificile to take over, putting patients at high risk for this serious infection.    As a patient or caregiver, it is important to understand your or your loved one's antibiotic treatment. It is especially important for caregivers to speak up when patients can't speak for themselves. Here are some important questions to ask your healthcare team.    What infection is this antibiotic treating and how do you know I have that infection?    What side effects  might occur from this antibiotic?    How long will I need to take this antibiotic?    Is it safe to take this antibiotic with other medications or supplements (e.g., vitamins) that I am taking?     Are there any special directions I need to know about taking this antibiotic? For example, should I take it with food?    How will I be monitored to know whether my infection is responding to the antibiotic?    What tests may help to make sure the right antibiotic is prescribed for me?      Information provided by:  www.cdc.gov/getsmart  U.S. Department of Health and Human Services  Centers for disease Control and Prevention  National Center for Emerging and Zoonotic Infectious Diseases  Division of Healthcare Quality Promotion             Medication List: This is a list of all your medications and when to take them. Check marks below indicate your daily home schedule. Keep this list as a reference.      Medications           Morning Afternoon Evening Bedtime As Needed    ASPIRIN NOT PRESCRIBED   Commonly known as:  INTENTIONAL   Please choose reason not prescribed, below                                rivaroxaban ANTICOAGULANT 20 MG Tabs tablet   Commonly known as:  XARELTO   Take 15 mg by mouth daily (with dinner)                                sulfamethoxazole-trimethoprim 800-160 MG per tablet   Commonly known as:  BACTRIM DS/SEPTRA DS   Take 1 tablet by mouth 2 times daily for 7 days Please complete one week course following surgery                                VITAMIN B COMPLEX PO   Takes total of 3 tablets every 2 weeks (divided)                                VITAMIN C PO   Takes total of 3 tablets every 2 weeks (divided)                                vitamin E 100 UNIT capsule   Commonly known as:  TOCOPHEROL   Takes total of 3 tablets every 2 weeks (divided)                                Zinc Gluconate 100 MG Tabs   Takes total of 3 tablets every 2 weeks (divided)                                           More Information        Discharge Instructions: Caring for Your Indwelling Urinary Catheter  You have been discharged with an indwelling urinary catheter (also called a Camacho catheter). A catheter is a thin, flexible tube. An indwelling urinary catheter has two parts. The first part is a tube that drains urine from your bladder. The second part is a bag or other device that collects the urine.  The most important thing to remember is that you want to prevent infection. Always wash your hands before handling your catheter bag or tubing.  Draining the bedside bag    Wash your hands with soap and clean, running water or use an alcohol-based hand  that contains at least 60% alcohol.    Hold the drainage tube over a toilet or measuring container.    Unclamp the tube and let the bag drain.    Don t touch the tip of the drainage tube or let it touch the toilet or container.  Cleaning the drainage tube    When the bag is empty, clean the tip of the drainage tube with an alcohol wipe.    Clamp the tube.    Reinsert the tube into the pocket on the drainage bag.  Cleaning your skin and tubing    Clean the skin near the catheter with soap and water.    Wash your genital area from front to back.    Wash the catheter tubing. Always wash the catheter in the direction away from your body.    You will be told when and how to change your bag and tubing.    Don t try to remove the catheter by yourself.    You may shower with the catheter in place.  Emptying a leg bag    Wash your hands.    Remove the stopper on the bag.    Drain the bag into the toilet or a measuring container. Don t let the tip of the drainage tube touch anything, including your fingers.    Clean the tip of the drainage tube with alcohol.    Replace the stopper.  Follow-up care  Make a follow-up appointment as directed by your healthcare provider  When to call your healthcare provider  Call your healthcare provider right away if you have any of the  following:    Chills or fever above 100.4 F (38 C)    Leakage around the catheter insertion site    Increased spasms (uncontrollable twitching) in your legs, abdomen, or bladder. Occasional mild spasms are normal.    Burning in the urinary tract, penis, or genital area    Nausea and vomiting    Aching in the lower back    Cloudy or bloody (pink or red) urine, sediment or mucus in the urine, or foul-smelling urine   Date Last Reviewed: 1/1/2017 2000-2017 [employer]. [address] [place] [address]. All rights reserved. This information is not intended as a substitute for professional medical care. Always follow your healthcare professional's instructions.                Discharge Instructions: Caring for Your Leg Bag  You are going home with a urinary catheter and collection device (drainage bag) in place. One type of collection device is called a leg bag. This is a smaller drainage bag that you can wear on your leg to collect urine during the day. The bag can fit under your clothing. You can move around with greater ease when using a leg bag instead of a larger collection bag.  You were shown how to care for your catheter in the hospital. This sheet will help you remember those steps when you are at home.  Home care    Wash your hands thoroughly before and after you care for your catheter or collection device.    Gather your supplies:  ? Alcohol wipes  ? Soap and water  ? Towel and washcloth  ? Leg strap and leg bag    Use soap and water to wash the area where your catheter enters your body. Rinse well.    Secure the bag morris to your leg:  ? Position the leg band high on your thigh with the product label pointing away from your leg.  ? Stretch the leg band in place and fasten.  ? Place the catheter tubing over the bag and secure it. You may secure it with a Velcro tab or other method, depending on the product you use. Be sure to leave enough loop in the catheter above the leg band to  avoid pulling on the tube.  ? Every 4 to 6 hours, reposition the band to prevent pressure from the elastic on your leg. You can do this by changing the bag to the other leg or by raising or lowering the leg band.  ? Wash the band as frequently as needed. You can hand wash and dry the leg band.    Place the bag in the bag morris.    Clean the urine bag end of the catheter and your catheter port with an alcohol wipe.    Place a towel under the bag and port to keep urine from dripping onto your leg.    Before connecting the outlet valve at the bottom of the bag to the catheter, make sure that it is firmly closed. Flip the valve upward toward the bag; it needs to snap firmly in place. Be sure not to tug on the tubing. Be gentle.    Attach the urine bag to the end of the catheter; insert the connector snugly into the catheter port. You can avoid dribbling urine by bending the catheter tubing just below the tip and holding it while you disconnect it from the catheter. Be careful to keep the tip clean while connecting the leg bag tubing to the catheter--this keeps germs from getting into the system.    Drain the bag when it is full. To drain the bag, flip the clamp downward. Direct the flexible outlet tube to control the flow of urine. You don t have to disconnect the leg bag from the catheter to empty it. Raise your leg up to the edge of the toilet to reach the leg bag. Then you can empty the bag directly into the toilet. This way, you won t need to bend over, which may be uncomfortable.    Keep the leg bag clean. Rinse daily with equal parts water and vinegar to reduce odor and keep the bag free of germs.    Remember to keep the drainage bag below the level of your bladder for proper drainage.  Follow-up  Make a follow-up appointment as directed by your healthcare provider.  When to call your healthcare provider  Call your healthcare provider right away if you have any of the following:    Redness, swelling, or warmth  around the catheter entry site    Pus draining from your catheter entry site or into the catheter tubing and bag    Blood, clots, or floating debris in the urine    Nausea and vomiting    Shaking chills    Fever above 100.4 F (38 C)    Pain that is not relieved by medicine     Catheter that falls out or is dislodged   Date Last Reviewed: 1/1/2017 2000-2017 [employer]. [address] [place] [address]. All rights reserved. This information is not intended as a substitute for professional medical care. Always follow your healthcare professional's instructions.

## 2018-09-28 VITALS
TEMPERATURE: 96.2 F | HEIGHT: 69 IN | RESPIRATION RATE: 18 BRPM | HEART RATE: 72 BPM | SYSTOLIC BLOOD PRESSURE: 119 MMHG | WEIGHT: 164.68 LBS | BODY MASS INDEX: 24.39 KG/M2 | DIASTOLIC BLOOD PRESSURE: 62 MMHG | OXYGEN SATURATION: 94 %

## 2018-09-28 LAB
ANION GAP SERPL CALCULATED.3IONS-SCNC: 7 MMOL/L (ref 3–14)
BASOPHILS # BLD AUTO: 0 10E9/L (ref 0–0.2)
BASOPHILS NFR BLD AUTO: 0 %
BUN SERPL-MCNC: 19 MG/DL (ref 7–30)
CALCIUM SERPL-MCNC: 8.3 MG/DL (ref 8.5–10.1)
CHLORIDE SERPL-SCNC: 106 MMOL/L (ref 94–109)
CO2 SERPL-SCNC: 24 MMOL/L (ref 20–32)
CREAT SERPL-MCNC: 1.22 MG/DL (ref 0.66–1.25)
DIFFERENTIAL METHOD BLD: ABNORMAL
EOSINOPHIL # BLD AUTO: 0 10E9/L (ref 0–0.7)
EOSINOPHIL NFR BLD AUTO: 0 %
ERYTHROCYTE [DISTWIDTH] IN BLOOD BY AUTOMATED COUNT: 13 % (ref 10–15)
GFR SERPL CREATININE-BSD FRML MDRD: 59 ML/MIN/1.7M2
GLUCOSE SERPL-MCNC: 179 MG/DL (ref 70–99)
HCT VFR BLD AUTO: 44.6 % (ref 40–53)
HGB BLD-MCNC: 15 G/DL (ref 13.3–17.7)
IMM GRANULOCYTES # BLD: 0 10E9/L (ref 0–0.4)
IMM GRANULOCYTES NFR BLD: 0.1 %
INTERPRETATION ECG - MUSE: NORMAL
LYMPHOCYTES # BLD AUTO: 0.9 10E9/L (ref 0.8–5.3)
LYMPHOCYTES NFR BLD AUTO: 11.4 %
MCH RBC QN AUTO: 30.1 PG (ref 26.5–33)
MCHC RBC AUTO-ENTMCNC: 33.6 G/DL (ref 31.5–36.5)
MCV RBC AUTO: 90 FL (ref 78–100)
MONOCYTES # BLD AUTO: 0.1 10E9/L (ref 0–1.3)
MONOCYTES NFR BLD AUTO: 1.7 %
NEUTROPHILS # BLD AUTO: 7.1 10E9/L (ref 1.6–8.3)
NEUTROPHILS NFR BLD AUTO: 86.8 %
NRBC # BLD AUTO: 0 10*3/UL
NRBC BLD AUTO-RTO: 0 /100
PLATELET # BLD AUTO: 149 10E9/L (ref 150–450)
POTASSIUM SERPL-SCNC: 4.9 MMOL/L (ref 3.4–5.3)
RBC # BLD AUTO: 4.98 10E12/L (ref 4.4–5.9)
SODIUM SERPL-SCNC: 137 MMOL/L (ref 133–144)
WBC # BLD AUTO: 8.2 10E9/L (ref 4–11)

## 2018-09-28 PROCEDURE — 80048 BASIC METABOLIC PNL TOTAL CA: CPT | Performed by: STUDENT IN AN ORGANIZED HEALTH CARE EDUCATION/TRAINING PROGRAM

## 2018-09-28 PROCEDURE — 36415 COLL VENOUS BLD VENIPUNCTURE: CPT | Performed by: STUDENT IN AN ORGANIZED HEALTH CARE EDUCATION/TRAINING PROGRAM

## 2018-09-28 PROCEDURE — 85025 COMPLETE CBC W/AUTO DIFF WBC: CPT | Performed by: STUDENT IN AN ORGANIZED HEALTH CARE EDUCATION/TRAINING PROGRAM

## 2018-09-28 RX ORDER — SULFAMETHOXAZOLE/TRIMETHOPRIM 800-160 MG
1 TABLET ORAL 2 TIMES DAILY
Qty: 14 TABLET | Refills: 0 | Status: SHIPPED | OUTPATIENT
Start: 2018-09-28 | End: 2018-10-23

## 2018-09-28 NOTE — OR NURSING
Upon transfer to inpatient room, this RN requested that the pt go through all belongings and go through wallet and make sure ALL belongings were present. This RN and pt's spouse Kelley watched Pt go through wallet and belongings.  Pt verified all belongings were present, all credidt cards present, all cash present and ID and other contents of wallet present. Transfer of care to 8A RN.

## 2018-09-28 NOTE — OP NOTE
Operative Report  9/27/2018    PREOPERATIVE DIAGNOSIS:  Prostatic hypertrophy with urinary retention  POSTOPERATIVE DIAGNOSIS: Same as above    PROCEDURE PERFORMED: Holmium laser enucleation of the prostate  ATTENDING SURGEON: Henry Oliveros MD  RESIDENT SURGEON: Akil Ray MD    FINDINGS: Approximately 75 gm prostate with trilobar hypertrophy. Bladder with moderate trabeculation  ANESTHESIA: General.   INTRAVENOUS FLUIDS: See anesthesia records  ESTIMATED BLOOD LOSS: Less than 100 ml   SPECIMENS: Prostate adenoma  DRAINS: 22-Comoran 3-way catheter with 60 ml in balloon     INDICATIONS FOR PROCEDURE: Andre Alas is a(n) 68 year old male who was seen in consultation for urinary retention. He had failed optimal medical therapy. Prostate volume estimated to be 75 grams. He has a history of high volume low grade prostate cancer His most recent PSA is   PSA   Date Value Ref Range Status   09/07/2018 13.80 (H) 0 - 4 ug/L Final     Comment:     Assay Method:  Chemiluminescence using Siemens Vista analyzer   . After discussion of the risks, benefits and alternatives of the procedure, the patient agreed to proceed with the above stated procdure.  Preference given to mini-HoLEP with plan to enucleate obstructing median lobe in order to minimize post-operative sexual dysfunction or urinary or issues related to urinary control.  He is aware of the potential for this to not sufficiently relieve his obstruction.    DESCRIPTION OF PROCEDURE: After obtaining informed consent, the patient was taken to the operating room and placed under general anesthesia.  He was repositioned in dorsal lithotomy making sure that the legs were positioned and padded safely.  He was then prepped and draped in standard sterile fashion.  Culture sensitive antibiotics were administered and bilateral sequential compression devices were placed.  A time out was performed confirming the appropriate patient identity and planned procedure.     The  procedure was begun by generously lubricating the urethra.  We then sequentially dilated the urethra using the chanel sounds from 22-28F.  The urethra was noted to be adequate and did not require use of the juliana urethrotome in order to place the 26F outer sheath.  The outer sheath was placed over a deflecting obturator and we then looked the scope through the posterior urethra and into the bladder.  The prostate was noted to have a trilobar configuration.  We inspected the bladder noting that it had moderate trabeculation.  At this point we inserted the 550 micron holmium laser through the 7F laser catheter.    We began enucleation by making a paired 5 and 7 oclock incision.  There was a very large median lobe which we isolated this way and matched the incisions in a v-shape proximal to the veromontanum.  WE then came beneath the gland to the base of the bladder neck and lifted the median lobe into the bladder.  We then freed the lobe from the bladder neck attachments.  There was quite a bit of lateral lobe hypertrophy near the apex however the enucleation of the median lobe certainly improved the channel as was discussed with him preoperatively.   Total enucleation time was 19 minutes.    At this point there was a moderate amount of bleeding and approximately 5 minutes were spent identifying bleeding vessels in the fossa and coagulating them with the laser.  Once hemostasis was adequate we switched from the laser resectoscope to the 26F offset telescope with the Piranha morcellation device.  We added an extra inflow to distend the bladder.  The blades were adjusted and set at a rate of 1500 RPM.  We proceeded with morcellation making sure that we morcellated the entirety of the adenoma.  Total morcellation time was 2 minutes.     We then switched back to the laser resectoscope one final time to ensure that no residual tissue was left in the bladder.  We also confirmed that the bladder was unharmed from  morcellation and that both ureteral orifices were unharmed during the procedure.  We inspected the fossa and obtained final hemostasis.  We looked the scope out noting that the sphincter was completely intact without evidence of thermal or mechanical injury.     We lubricated the urethra again and passed a 22F 3-way nava catheter over a catheter guide.  The urine was noted to be pink.  We filled the balloon with 60 ml of sterile water and did not place the catheter on traction.  The patient was woken from anesthesia and taken to the recovery room in stable condition.     The specimen was weighed and found to be approximately 16 g.     POSTOPERATIVE PLAN:   -We will monitor the patient post operatively on continuous bladder irrigation for signs of ongling bleeding.  If clear we will consider discharge with nava removal as an outpatient.  If continues to have ongoing bleeding concerning for clot formation without bladder irrigation will plan to admit for observation    Henry Oliveros

## 2018-09-28 NOTE — PLAN OF CARE
Problem: Surgery Nonspecified (Adult)  Goal: Signs and Symptoms of Listed Potential Problems Will be Absent, Minimized or Managed (Surgery Nonspecified)  Signs and symptoms of listed potential problems will be absent, minimized or managed by discharge/transition of care (reference Surgery Nonspecified (Adult) CPG).   Outcome: No Change    VS: VSS, capnography    O2: >90% on RA   Output: Pt unable to void after catheter removal at 1200 (CBI discontinued this morning at 0800). Continue to push PO fluids and monitor urine output.    Last BM: LBM 9/27 per pt report, passing flatus and BS active.    Activity: Pt up with SBA/indepedently, up to BR and in room.    Skin: Skin intact ex for incisions   Pain: Pt denies pain, does not want to take narcotics   CMS: CMS intact, denies N/T in all extremities. DP +2 bilaterally, able to wiggle toes.    Dressing: N/A, incision PEGGY, small amount of blood noted at tip of penis, per MD normal.    Diet: Tolerating regular diet without N/V, adequate intake of PO fluids.    LDA: PIV WDL and SL, flushed. Camacho discontinued at 1200.    Equipment: Capnography, personal belongings at bedside   Plan: Continue to monitor patient, push PO fluids and encourage voiding. Pt due to void at 1600.      Addendum: Pt voided x3 towards end of shift, pt voided 175, then 50 mL, bladder scanned for 485. Pt then voided an additional 175 mL. Please update urology resident with current PVR.

## 2018-09-28 NOTE — PROGRESS NOTES
Observation/outpatient goals:    3127-6122:   Pt is voiding via catheter, CBI clamped this morning per MD orders. Urine is pink/red in color since being off CBI, MD says this is expected. Pt did report scant bleeding near urethra when trying to have BM, pt is only passing flatus. Pt ambulating with SBA, gait extremely steady. PIV SL because pt is drinking and eating adequately. Pt to have nava irrigated and discontinued by provider, if pt is able to urinate pt will be able to discharge today. Pt denies pain. Continue to monitor.     3141-9646:  Nava removed at 1200, pt unable to void. Pt ambulating independently, PIV SL. Pt eating and drinking adequate amounts. Pt due to urinate at 1600. Urology to follow-up with pt this afternoon.     0444-7986:  Pt still unable to void, has been drinking lots of PO fluids. PIV SL. Able to ambulate without assistance now that nava has been removed. Continue to monitor patient.     4410-8673:  Pt voided around 1415 for 175 mL, then voided 2nd time at 1430 for 50 mL (225 total). Pt bladder scanned for 485. Notified urology.   Pt voided an additional 175 mL, will update urology with results of PVR.

## 2018-09-28 NOTE — ANESTHESIA POSTPROCEDURE EVALUATION
Patient: Andre Alas    Procedure(s):  Holmium Laser Enucleation Of The Prostate - Wound Class: II-Clean Contaminated    Diagnosis:Urinary Retention  Diagnosis Additional Information: No value filed.    Anesthesia Type:  General, LMA    Note:  Anesthesia Post Evaluation    Patient location during evaluation: PACU and Bedside  Patient participation: Able to fully participate in evaluation  Level of consciousness: awake and alert  Pain management: adequate  Airway patency: patent  Cardiovascular status: acceptable  Respiratory status: acceptable  Hydration status: balanced  PONV: none     Anesthetic complications: None          Last vitals:  Vitals:    09/27/18 2000 09/27/18 2015 09/27/18 2030   BP: (!) 145/110 (!) 135/94 144/82   Resp: 11 16 24   Temp:  36.7  C (98  F)    SpO2: 99% 100% 100%         Electronically Signed By: Christelle De La Cruz MD  September 27, 2018  8:54 PM

## 2018-09-28 NOTE — OR NURSING
PACU to Inpatient Nursing Handoff    Patient Andre Alas is a 68 year old male who speaks English.   Procedure Procedure(s):  Holmium Laser Enucleation Of The Prostate - Wound Class: II-Clean Contaminated   Surgeon(s) Primary: Henry Oliveros MD  Resident - Assisting: Akil Ray MD     Allergies   Allergen Reactions     Dust Mites      Mold        Isolation  [unfilled]     Past Medical History   has a past medical history of Allergic rhinitis due to dust (11/29/2011); Atrioventricular canal (AVC), incomplete; BPH without urinary obstruction (11/29/2011); CVA (cerebral vascular accident) (H) (07/2014); Hyperlipidemia LDL goal <70 (11/28/2011); Hypertension goal BP (blood pressure) < 140/90; NSTEMI (non-ST elevated myocardial infarction) (H) (7/2014); PAF (paroxysmal atrial fibrillation) (H) (7/2014); and Prostate cancer (H) (02/2018). He also has no past medical history of Acne vulgaris; Actinic keratosis; Basal cell carcinoma; Eczema; Malignant melanoma nos; Psoriasis; or Squamous cell carcinoma.    Anesthesia General   Dermatome Level     Preop Meds Not applicable   Nerve block Not applicable   Intraop Meds dexamethasone (Decadron)  fentanyl (Sublimaze): 200 mcg total  ondansetron (Zofran): last given at 1915  versed 2 mg   Local Meds No   Antibiotics ampicillin (Omnipen) - last given at 1830  gentamicin (Garamycin) - last given at 1835     Pain Patient Currently in Pain: denies   PACU meds  Not applicable   PCA / epidural No   Capnography     Telemetry ECG Rhythm: Other (Comment);First degree AV block (hx of A-fib)   Inpatient Telemetry Monitor Ordered? No        Labs Glucose Lab Results   Component Value Date    GLC 83 09/07/2018       Hgb Lab Results   Component Value Date    HGB 15.6 09/07/2018       INR No results found for: INR   PACU Imaging Not applicable     Wound/Incision Incision/Surgical Site 09/27/18 Penis (Active)   Incision Assessment UTV 9/27/2018  7:39 PM   Maryuri-Incision  Assessment UTV 9/27/2018  7:39 PM   Incision Drainage Amount None 9/27/2018  7:39 PM   Dressing Intervention Open to air / No Dressing 9/27/2018  7:39 PM   Number of days:0      CMS Peripheral Neurovascular WDL: WDL (09/27/18 1939)      Equipment CBI   Other LDA       IV Access Peripheral IV 09/27/18 Right Hand (Active)   Site Assessment WDL 9/27/2018  7:39 PM   Line Status Infusing 9/27/2018  7:39 PM   Phlebitis Scale 0-->no symptoms 9/27/2018  2:50 PM   Infiltration Scale 0 9/27/2018  2:50 PM   Extravasation? No 9/27/2018  2:50 PM   Dressing Intervention New dressing  9/27/2018  2:50 PM   Number of days:0      Blood Products Not applicable EBL 50 mL   Intake/Output Date 09/27/18 0700 - 09/28/18 0659   Shift 2003-8322 1043-0029 2675-4260 24 Hour Total   I  N  T  A  K  E   I.V.  700  700    Shift Total  (mL/kg)  700  (9.37)  700  (9.37)   O  U  T  P  U  T   Shift Total  (mL/kg)       Weight (kg) 74.7 74.7 74.7 74.7        Drains / Camacho     Time of void PreOp Void Prior to Procedure: 0800 (09/27/18 1420)    PostOp      Diapered? No   Bladder Scan     PO    pt dirinking and eating without issues     Vitals    B/P: (!) 148/99  T: 98  F (36.7  C)    Temp src: Oral  P:       Heart Rate: 72 (09/27/18 2015)     R: 24  O2:  SpO2: 98 %    O2 Device: Nasal cannula (09/27/18 2000)    Oxygen Delivery: 3 LPM (09/27/18 2000)         Family/support present significant other at bedside   Patient belongings Patient Belongings: clothing;shoes;glasses  Disposition of Belongings: Locker   Patient transported on recliner   DC meds/scripts (obs/outpt) Yes, scripts and sent to inpatient pharmacy   Inpatient Pain Meds Released? Yes       Special needs/considerations None   Tasks needing completion None       Amy Patiño RN  [facility]COM [id number]

## 2018-09-28 NOTE — PLAN OF CARE
Problem: Patient Care Overview  Goal: Individualization & Mutuality  Pt arrived to the unit at @2235 via recline. Pt transferred to the bed with stand by assist. Pt oriented to the room and call light system.   Pt A&O x's 4. VSS. Afebrile. 02 sats in the 90s on 1LPM, weaned off this morning and sats remains in low to mid 90s. Lungs clear. Denies SOB, CP and nausea. Tolerated pudding and po fluid.  Bowel sound active in all quadrants. No BM but passing gas. Pt is on CBI, urine cleared overnight,  Pain well managed, did not want any thing for pain.  CMS intact, denies N/T. PIV patent and infusing per order Pt slept between care and is able to make needs known, call light with in reach. Will continue to monitor.   0000: pt is on CBI, urine is pink.   0200: pt sleeping, urine color is clearing,   0400: pt is sleeping , continues on CBI.urine continues to clear   0600: urine clear, continue CBI on low rate.

## 2018-09-28 NOTE — OR NURSING
Pt alert, VSS, denies pain.  CBI running and urine is a dark fruit punch color.  Resident came to bedside and said pt may discharge home if urine begins to clear up.  Pt wife to bedside and has had questions answered by Dr Oliveros in waiting lounge.  Dr Oliveros told wife that pt may stay if needed.  Pt up in chair and feeling well.  Urine is still running bloody.  Will continue to monitor. Dischege to home if able.    Attempted to clamp CBI for 15 minutes at 2100.  When opened rosa red blood coming out of catheter.  Paged urology.  Plan is to admit for overnight obs on 8A.

## 2018-09-28 NOTE — BRIEF OP NOTE
[facility], [place]    Brief Operative Note    Pre-operative diagnosis: Urinary Retention  Post-operative diagnosis * No post-op diagnosis entered *  Procedure: Procedure(s):  Holmium Laser Enucleation Of The Prostate - Wound Class: II-Clean Contaminated  Surgeon: Surgeon(s) and Role:     * Henry Oliveros MD - Primary     * Akil Ray MD - Resident - Assisting  Anesthesia: General   Estimated blood loss: Less than 50 ml  Drains: 22-Fr 3 way catheter with 20 ml in balloon, on CBI  Specimens:   ID Type Source Tests Collected by Time Destination   A : prostate tissue Tissue Prostate SURGICAL PATHOLOGY EXAM Henry Oliveros MD 9/27/2018  7:25 PM      Findings:   Large median lobe which appeared to be obstructing, moderate lateral lobe hypertropy .  Complications: None.  Implants: None.

## 2018-09-28 NOTE — PROGRESS NOTES
"Urology  Progress Note    No acute issues overnight  CBI on slow drip  Pain well controlled   Flatus, no BM  Ambulating     Exam  /62 (BP Location: Left arm)  Temp 97  F (36.1  C) (Oral)  Resp 21  Ht 1.753 m (5' 9\")  Wt 74.7 kg (164 lb 10.9 oz)  SpO2 94%  BMI 24.32 kg/m2  No acute distress  Unlabored breathing  Abdomen soft, nt/nd  Lower extremities non-edematous bilaterally  Camacho rust colored in appearance on slow rate CBI    Labs    AM labs pending    Assessment/Plan  68 year old y/o male POD#1 s/p HoLEP for BPH with LUTS.     Neuro: APAP, Dilaudid, Oxycodone for pain control  CV: HDS  Pulm: incentive spirometry while awake  FEN/GI: Regular diet, MIVF @ 100cc/hr, will SLIV  Endo: AGUSTÍN  : Will clamp CBI this AM, and plan to remove catheter in the afternoon  Heme/ID: Maryuri-op abx completed  Activity: Up ad baljinder  PPx: SCDs    Seen and examined with the chief resident. Will discuss with Dr. Oliveros.    Akil Ray, PGY-3  Urology Resident     Contacting the Urology Team     Please use the following job codes to reach the Urology Team. Note that you must use an in house phone and that job codes cannot receive text pages.     On weekdays, dial [phone] (or star-star-star [phone] on the new [employer] telephones) then [phone] to reach the Adult Urology resident or PA on call    On weekdays, dial [phone] (or star-star-star [phone] on the new [employer] telephones) then [phone] to reach the Pediatric Urology resident    On weeknights and weekends, dial [phone] (or star-star-star [phone] on the new [employer] telephones) then [phone] to reach the Urology resident on call (for both Adult and Pediatrics)          "

## 2018-09-28 NOTE — PROVIDER NOTIFICATION
Notified Akil Ray urology resident  Pt in 813 (R,T) voided 225 in urinal, bladder scanned for 485. What number do you want his PVR in order to discharge?   Thanks  Sheela WHITTAKER [phone]

## 2018-09-28 NOTE — ANESTHESIA CARE TRANSFER NOTE
Patient: Andre Alas    Procedure(s):  Holmium Laser Enucleation Of The Prostate - Wound Class: II-Clean Contaminated    Diagnosis: Urinary Retention  Diagnosis Additional Information: No value filed.    Anesthesia Type:   General, LMA     Note:  Airway :Face Mask  Patient transferred to:PACU  Comments: To PACU with 02, Spont RR. Monitors applied, VSS, PIV/airway patent, Report to RN all questions/concerns answered.   Handoff Report: Identifed the Patient, Identified the Reponsible Provider, Reviewed the pertinent medical history, Discussed the surgical course, Reviewed Intra-OP anesthesia mangement and issues during anesthesia, Set expectations for post-procedure period and Allowed opportunity for questions and acknowledgement of understanding      Vitals: (Last set prior to Anesthesia Care Transfer)    CRNA VITALS  9/27/2018 1906 - 9/27/2018 1940      9/27/2018             Resp Rate (observed): (!)  3                Electronically Signed By: SYLVESTER Rivero CRNA  September 27, 2018  7:40 PM

## 2018-09-29 NOTE — PLAN OF CARE
Problem: Surgery Nonspecified (Adult)  Goal: Signs and Symptoms of Listed Potential Problems Will be Absent, Minimized or Managed (Surgery Nonspecified)  Signs and symptoms of listed potential problems will be absent, minimized or managed by discharge/transition of care (reference Surgery Nonspecified (Adult) CPG).   Outcome: Adequate for Discharge Date Met: 09/28/18          VS:     Pt A/O X 4. Afebrile. VSS. /62 (BP Location: Left arm)  Pulse 72  Temp 96.2  F (35.7  C) (Oral)  Resp 18  SpO2 94%.   Lungs- clear bilaterally. Denies nausea, shortness of breath, and chest pain.     Output:     Bowels- active in all four quadrants. Voiding adequately and without difficulty in the urinal. Voided 175 prior to discharge. . Dr. Ray notified and ok for pt to discharge.      Activity:     Pt up ad baljinder.      Skin:  No skin issues observed.     Pain:     Denies pain.       CMS:     Denies numbness and tingling in all extremities.      Dressing:     None     Diet:     Pt is on a Regular diet.     LDA:     PIV removed prior to discharge.      Plan:     Pt. discharged at 1720 to home, was accompanied by wife, and left with personal belongings. Pt. received complete discharge paperwork and antibiotic as filled by discharge pharmacy.  Discharge teaching included medication, voiding problems (see physician) or if not voiding (go to ED immediately), pain management, activity restrictions, and signs and symptoms of infection. Pt. had no further questions at the time of discharge and no unmet needs were identified.

## 2018-10-01 ENCOUNTER — CARE COORDINATION (OUTPATIENT)
Dept: UROLOGY | Facility: CLINIC | Age: 68
End: 2018-10-01

## 2018-10-01 NOTE — PROGRESS NOTES
Andre Alas,    How are you doing after your surgical procedure on last Thursday?    Any fever, chills, nausea or vomiting? no  How is your appetite? good  Are you drinking enough fluids? yes  Have you had a bowel movement since you have been home? yes  Are you having any difficulties with urination? Off and on.  Any problems with your catheter? NA  Is your urine clear yellow? Clear   How is your pain? Denies.          Please feel free to reply via MyChart or contact the clinic.  [phone], option #3 to speak to the nurse

## 2018-10-01 NOTE — PROGRESS NOTES
Andre Alas,2  How are you doing after your surgical procedure on last Thursday    Any fever, chills, nausea or vomiting? no  How is your appetite? good  Are you drinking enough fluids? yes  Have you had a bowel movement since you have been home? Small one  Are you having any difficulties with urination? Off and on  Any problems with your catheter? NA  Is your urine clear yellow? Yes   How does your incision look?  NA   How is your pain? Denies      Do you have any questions or concerns? no        Please feel free to reply via [employer] or contact the clinic.  [phone], option #3 to speak to the nurse

## 2018-10-02 LAB — COPATH REPORT: NORMAL

## 2018-10-03 DIAGNOSIS — R33.9 URINARY RETENTION: Primary | ICD-10-CM

## 2018-10-03 RX ORDER — TAMSULOSIN HYDROCHLORIDE 0.4 MG/1
0.4 CAPSULE ORAL DAILY
Qty: 30 CAPSULE | Refills: 1 | Status: SHIPPED | OUTPATIENT
Start: 2018-10-03 | End: 2018-10-23

## 2018-10-09 ENCOUNTER — ALLIED HEALTH/NURSE VISIT (OUTPATIENT)
Dept: UROLOGY | Facility: CLINIC | Age: 68
End: 2018-10-09
Payer: COMMERCIAL

## 2018-10-09 DIAGNOSIS — R33.9 URINARY RETENTION: Primary | ICD-10-CM

## 2018-10-23 ENCOUNTER — RADIANT APPOINTMENT (OUTPATIENT)
Dept: ULTRASOUND IMAGING | Facility: CLINIC | Age: 68
End: 2018-10-23
Attending: UROLOGY
Payer: COMMERCIAL

## 2018-10-23 ENCOUNTER — OFFICE VISIT (OUTPATIENT)
Dept: UROLOGY | Facility: CLINIC | Age: 68
End: 2018-10-23
Payer: COMMERCIAL

## 2018-10-23 VITALS
SYSTOLIC BLOOD PRESSURE: 148 MMHG | DIASTOLIC BLOOD PRESSURE: 92 MMHG | BODY MASS INDEX: 24.29 KG/M2 | WEIGHT: 164 LBS | HEART RATE: 66 BPM | HEIGHT: 69 IN

## 2018-10-23 DIAGNOSIS — R33.9 URINARY RETENTION: ICD-10-CM

## 2018-10-23 DIAGNOSIS — C61 MALIGNANT NEOPLASM OF PROSTATE (H): ICD-10-CM

## 2018-10-23 DIAGNOSIS — R33.9 URINARY RETENTION: Primary | ICD-10-CM

## 2018-10-23 LAB
ALBUMIN UR-MCNC: NEGATIVE MG/DL
ANION GAP SERPL CALCULATED.3IONS-SCNC: 5 MMOL/L (ref 3–14)
APPEARANCE UR: CLEAR
BACTERIA #/AREA URNS HPF: ABNORMAL /HPF
BILIRUB UR QL STRIP: NEGATIVE
BUN SERPL-MCNC: 14 MG/DL (ref 7–30)
CALCIUM SERPL-MCNC: 8.8 MG/DL (ref 8.5–10.1)
CHLORIDE SERPL-SCNC: 107 MMOL/L (ref 94–109)
CO2 SERPL-SCNC: 28 MMOL/L (ref 20–32)
COLOR UR AUTO: ABNORMAL
CREAT SERPL-MCNC: 1.03 MG/DL (ref 0.66–1.25)
GFR SERPL CREATININE-BSD FRML MDRD: 72 ML/MIN/1.7M2
GLUCOSE SERPL-MCNC: 94 MG/DL (ref 70–99)
GLUCOSE UR STRIP-MCNC: NEGATIVE MG/DL
HGB UR QL STRIP: ABNORMAL
KETONES UR STRIP-MCNC: NEGATIVE MG/DL
LEUKOCYTE ESTERASE UR QL STRIP: ABNORMAL
MUCOUS THREADS #/AREA URNS LPF: PRESENT /LPF
NITRATE UR QL: NEGATIVE
PH UR STRIP: 7 PH (ref 5–7)
POTASSIUM SERPL-SCNC: 4.4 MMOL/L (ref 3.4–5.3)
RBC #/AREA URNS AUTO: 4 /HPF (ref 0–2)
SODIUM SERPL-SCNC: 141 MMOL/L (ref 133–144)
SOURCE: ABNORMAL
SP GR UR STRIP: 1.01 (ref 1–1.03)
UROBILINOGEN UR STRIP-MCNC: 0 MG/DL (ref 0–2)
WBC #/AREA URNS AUTO: 32 /HPF (ref 0–5)

## 2018-10-23 PROCEDURE — 76770 US EXAM ABDO BACK WALL COMP: CPT

## 2018-10-23 RX ORDER — TAMSULOSIN HYDROCHLORIDE 0.4 MG/1
0.4 CAPSULE ORAL DAILY
Qty: 30 CAPSULE | Refills: 1 | Status: SHIPPED | OUTPATIENT
Start: 2018-10-23 | End: 2021-02-01

## 2018-10-23 ASSESSMENT — PAIN SCALES - GENERAL: PAINLEVEL: NO PAIN (0)

## 2018-10-23 NOTE — MR AVS SNAPSHOT
After Visit Summary   10/23/2018    Andre Alas    MRN: [id number]           Patient Information     Date Of Birth          1950        Visit Information        Provider Department      10/23/2018 8:20 AM Henry Oliveros MD [facility] Urology and [facility] for Prostate and Urologic Cancers        Today's Diagnoses     Urinary retention    -  1    Malignant neoplasm of prostate (H)          Care Instructions    Schedule a renal US and a BMP lab appointment today.    Reschedule one month appointment out to two months with a lab appointment for a PSA.    It was a pleasure meeting with you today.  Thank you for allowing me and my team the privilege of caring for you today.  YOU are the reason we are here, and I truly hope we provided you with the excellent service you deserve.  Please let us know if there is anything else we can do for you so that we can be sure you are leaving completely satisfied with your care experience.                  Follow-ups after your visit        Your next 10 appointments already scheduled     Oct 23, 2018 10:30 AM CDT   LAB with [facility] ([facility])    [address] [place] [id number] [phone]           Please do not eat 10-12 hours before your appointment if you are coming in fasting for labs on lipids, cholesterol, or glucose (sugar). This does not apply to pregnant women. Water, hot tea and black coffee (with nothing added) are okay. Do not drink other fluids, diet soda or chew gum.            Oct 23, 2018  1:00 PM CDT   US RENAL COMPLETE with FKUS1   [facility] ([facility])    [address] [place] [id number] [phone]           How do I prepare for my exam? (Food and drink instructions) No Food and Drink Restrictions.  How do I prepare for my exam? (Other instructions) You do not need to do anything special to prepare for your exam.  What should  I wear: Wear comfortable clothes.  How long does the exam take: Most ultrasounds take 30 to 60 minutes.  What should I bring: Bring a list of your medicines, including vitamins, minerals and over-the-counter drugs. It is safest to leave personal items at home.  Do I need a :  No  is needed.  What do I need to tell my doctor: Tell your doctor about any allergies you may have.  What should I do after the exam: No restrictions, You may resume normal activities.  What is this test: An ultrasound uses sound waves to make pictures of the body. Sound waves do not cause pain. The only discomfort may be the pressure of the wand against your skin or full bladder.  Who should I call with questions: If you have any questions, please call the Imaging Department where you will have your exam. Directions, parking instructions, and other information is available on our website, [employer]/imaging.            Dec 18, 2018  8:40 AM CST   (Arrive by 8:25 AM)   Return Visit with Henry Oliveros MD   [facility] Urology and [facility] for Prostate and Urologic Cancers ([facility])    [address] [place] [id number] [phone]              Future tests that were ordered for you today     Open Future Orders        Priority Expected Expires Ordered    PSA tumor marker Routine 12/23/2018 10/23/2019 10/23/2018    Renal US Routine  10/23/2019 10/23/2018    Basic metabolic panel Routine  10/23/2019 10/23/2018            Who to contact     Please call your clinic at [phone] to:    Ask questions about your health    Make or cancel appointments    Discuss your medicines    Learn about your test results    Speak to your doctor            Additional Information About Your Visit        "[employer] Information     [employer] is an electronic gateway that provides easy, online access to your medical records. With [employer], you can request a clinic appointment, read your test results, renew  "a prescription or communicate with your care team.     To sign up for MyChart visit the website at [email or web address]   You will be asked to enter the access code listed below, as well as some personal information. Please follow the directions to create your username and password.     Your access code is: [email or web address]  Expires: 2019  9:31 AM     Your access code will  in 90 days. If you need help or a new code, please contact your [facility] or call [phone] for assistance.        Your Vitals Were     Pulse Height BMI (Body Mass Index)             66 1.753 m (5' 9\") 24.22 kg/m2          Blood Pressure from Last 3 Encounters:   10/23/18 (!) 148/92   18 119/62   18 162/90    Weight from Last 3 Encounters:   10/23/18 74.4 kg (164 lb)   18 74.7 kg (164 lb 10.9 oz)   18 76.7 kg (169 lb 3.2 oz)              We Performed the Following     POST-VOID RESIDUAL BLADDER SCAN     UA with Microscopic reflex to Culture     Urine Culture Aerobic Bacterial          Today's Medication Changes          These changes are accurate as of 10/23/18  9:51 AM.  If you have any questions, ask your nurse or doctor.               Stop taking these medicines if you haven't already. Please contact your care team if you have questions.     sulfamethoxazole-trimethoprim 800-160 MG per tablet   Commonly known as:  BACTRIM DS/SEPTRA DS   Stopped by:  Henry Oliveors MD                Where to get your medicines      These medications were sent to [id number] - [facility] [address]  [address] [facility] [address]     Phone:  [phone]     tamsulosin 0.4 MG capsule                Primary Care Provider Office Phone # Fax #    Marybeth Gaffney [phone] [address] [facility] [address]        Equal Access to Services     ROBERT HINOJOSA AH: Ezra Peraza, larisa borges, adam ny " clara tenoriosteven carrollgigicary la'aan ah. [facility] [phone].    ATENCIÓN: Si rickla rosalio, tiene a espino disposición servicios gratuitos de asistencia lingüística. Abe al [phone].    We comply with applicable federal civil rights laws and [place] laws. We do not discriminate on the basis of race, color, national origin, age, disability, sex, sexual orientation, or gender identity.            Thank you!     Thank you for choosing [facility] UROLOGY AND [facility] FOR PROSTATE AND UROLOGIC CANCERS  for your care. Our goal is always to provide you with excellent care. Hearing back from our patients is one way we can continue to improve our services. Please take a few minutes to complete the written survey that you may receive in the mail after your visit with us. Thank you!             Your Updated Medication List - Protect others around you: Learn how to safely use, store and throw away your medicines at www.disposemymeds.org.          This list is accurate as of 10/23/18  9:51 AM.  Always use your most recent med list.                   Brand Name Dispense Instructions for use Diagnosis    ASPIRIN NOT PRESCRIBED    INTENTIONAL    0 each    Please choose reason not prescribed, below    Old myocardial infarction       rivaroxaban ANTICOAGULANT 20 MG Tabs tablet    XARELTO     Take 15 mg by mouth daily (with dinner)    PAF (paroxysmal atrial fibrillation) (H)       tamsulosin 0.4 MG capsule    FLOMAX    30 capsule    Take 1 capsule (0.4 mg) by mouth daily    Urinary retention       VITAMIN B COMPLEX PO      Takes total of 3 tablets every 2 weeks (divided)        VITAMIN C PO      Takes total of 3 tablets every 2 weeks (divided)        vitamin E 100 UNIT capsule    TOCOPHEROL     Takes total of 3 tablets every 2 weeks (divided)        Zinc Gluconate 100 MG Tabs      Takes total of 3 tablets every 2 weeks (divided)

## 2018-10-23 NOTE — PATIENT INSTRUCTIONS
Schedule a renal US and a BMP lab appointment today.    Reschedule one month appointment out to two months with a lab appointment for a PSA.    It was a pleasure meeting with you today.  Thank you for allowing me and my team the privilege of caring for you today.  YOU are the reason we are here, and I truly hope we provided you with the excellent service you deserve.  Please let us know if there is anything else we can do for you so that we can be sure you are leaving completely satisfied with your care experience.

## 2018-10-23 NOTE — PROGRESS NOTES
HoLEP 4 Week Follow-Up Note    Today I had the pleasure of seeing Mr. Alas in follow-up for a history of severe LUTS from prostatic enlargement and hx of Aleksandar 6 prostate cancer. He elected to proceed with surveillance of prostate cancer but wanted to address his urinary retention without radical surgery.    He underwent holmium laser enucleation of the prostate (median lobe only) approximately 4 weeks ago.  Decision was made to only treat median lobe as it was ball valving and goal was to resolve retention rather than more advanced resection/enucleation which could make possible prostatectomy more challenging in future.    13.9 gms of tissue were removed.  Pathology was benign.     His recovery thus far has been adequate.     Today he notes he is doing well, feels his frequency and urinary stream have improved and nocturia has reduced to 1x per night. He does report some mild mixed stress and urge incontinence.  Stress leak only occurs when doing sit ups, not wearing pads.  He feels he is emptying his bladder well but had high + on bladder scan, 350 on straight cath. Bleeding has reduced significantly but pt continues to have a few drops of blood in the urine per day. Does note bladder spasms when he does sit-ups, also occasionally throughout the day when passing flatus.Compared to pre-procedure, he feels his voiding symptoms have improved considerably.  Has not had to catheterize since surgery.    Reports good sexual function.     AUA Symptom Score is 25/35 with a 4/6 for bother     Assessment/Plan - 68 year old male 4 weeks status post HoLEP  - Discussed the option of re-treatment given persistently high PVR, however pt is feeling well and has experienced significant improvement in his urinary symptoms following procedure.  Aware that should PVR's rise or should he develop any evidence of hydro/PATRICK he will need to resume CIC and likley undergo more aggressive intervention  - Recommend double-voiding  regularly  - Follow up in 2 months with PSA and bladder scan  - Renal US and BMP today    Scribe Disclosure:   I,Joseph Hogan, am serving as a scribe; to document services personally performed by Henry Oliveros MD based on data collection and the provider's statements to me.     >15 minutes were spent face to face with patient over half of which was spent providing medical counseling regarding anticipated postop recovery following HoLEP

## 2018-10-23 NOTE — NURSING NOTE
Chief Complaint   Patient presents with     RECHECK     Follow up- urinary retention     Cassy Millan, A

## 2018-10-23 NOTE — LETTER
10/23/2018       RE: Andre Alas  [address]  [facility] [address]     Dear Colleague,    Thank you for referring your patient, Andre Alas, to the [facility] UROLOGY AND [facility] FOR PROSTATE AND UROLOGIC CANCERS at [facility]. Please see a copy of my visit note below.    HoLEP 4 Week Follow-Up Note    Today I had the pleasure of seeing Mr. Alas in follow-up for a history of severe LUTS from prostatic enlargement and hx of [place] 6 prostate cancer. He elected to proceed with surveillance of prostate cancer but wanted to address his urinary retention without radical surgery.    He underwent holmium laser enucleation of the prostate (median lobe only) approximately 4 weeks ago.  Decision was made to only treat median lobe as it was ball valving and goal was to resolve retention rather than more advanced resection/enucleation which could make possible prostatectomy more challenging in future.    13.9 gms of tissue were removed.  Pathology was benign.     His recovery thus far has been adequate.     Today he notes he is doing well, feels his frequency and urinary stream have improved and nocturia has reduced to 1x per night. He does report some mild mixed stress and urge incontinence.  Stress leak only occurs when doing sit ups, not wearing pads.  He feels he is emptying his bladder well but had high + on bladder scan, 350 on straight cath. Bleeding has reduced significantly but pt continues to have a few drops of blood in the urine per day. Does note bladder spasms when he does sit-ups, also occasionally throughout the day when passing flatus.Compared to pre-procedure, he feels his voiding symptoms have improved considerably.  Has not had to catheterize since surgery.    Reports good sexual function.     AUA Symptom Score is 25/35 with a 4/6 for bother     Assessment/Plan - 68 year old male 4 weeks status post HoLEP  - Discussed the option of re-treatment  given persistently high PVR, however pt is feeling well and has experienced significant improvement in his urinary symptoms following procedure.  Aware that should PVR's rise or should he develop any evidence of hydro/PATRICK he will need to resume CIC and likley undergo more aggressive intervention  - Recommend double-voiding regularly  - Follow up in 2 months with PSA and bladder scan  - Renal US and BMP today    Scribe Disclosure:   IJoseph, am serving as a scribe; to document services personally performed by Henry Oliveros MD based on data collection and the provider's statements to me.     >15 minutes were spent face to face with patient over half of which was spent providing medical counseling regarding anticipated postop recovery following HoLEP      Again, thank you for allowing me to participate in the care of your patient.      Sincerely,    Henry Oliveros MD

## 2018-10-24 LAB
BACTERIA SPEC CULT: NO GROWTH
Lab: NORMAL
SPECIMEN SOURCE: NORMAL

## 2018-11-07 ENCOUNTER — TELEPHONE (OUTPATIENT)
Dept: UROLOGY | Facility: CLINIC | Age: 68
End: 2018-11-07

## 2018-11-07 NOTE — TELEPHONE ENCOUNTER
Health Call Center    Phone Message    May a detailed message be left on voicemail: yes    Reason for Call: Requesting Results   Name/type of test: Labs and Ultrasound  Date of test: 10/23/18  Was test done at a location other than [facility] (Please fill in the location if not [facility])?: Yes: Labs at , US at [facility]. Please call with results. OK to leave results in Voicemail.      Action Taken: Message routed to:  [facility] ([facility] Urology

## 2018-12-13 ENCOUNTER — PRE VISIT (OUTPATIENT)
Dept: UROLOGY | Facility: CLINIC | Age: 68
End: 2018-12-13

## 2018-12-17 NOTE — PROGRESS NOTES
"  UROLOGY FOLLOW-UP NOTE          Chief Complaint:   Today I had the pleasure of seeing . Andre Alas in follow-up for a chief complaint of LUTS in the setting of large gland prostatic hypertrophy with low risk prostate cancer         Interval Update    Andre Alas is a very pleasant 68 year old male     Brief  History:   Mr. Alas has a history of severe LUTS From prostatic enlargement and hx of [place] 6 prostate cancer. He elected to proceed with surveillance of prostate cancer but wanted to address his urinary retention without radical surgery.      He underwent HoLEP (median lobe only) on 9/27/18. Decision was made to only treat median lobe as it was ball valving and the goal was to resolve retention rather than more advanced resection/enucleation which could make possible prostatectomy more challenging in the future. 13.9 gms of tissue were removed, pathology was benign.    Today notes:   He is doing well. He reports improved urinary control. His nocturia has almost completely resolved (at most 1x per night). He does continue to endorse intermittency and weak stream.     PVR today was 419 ml. He is not catheterizing.    He discontinued Flomax as he believed it increased his urinary leakage/incontinence.          Physical Exam:   Patient is a 68 year old  male   Vitals: Blood pressure 121/77, pulse 60, height 1.753 m (5' 9\"), weight 79 kg (174 lb 1.6 oz).  Notable Findings on Exam well-nourished male in no apparent distress        Labs and Pathology:    I personally reviewed all applicable laboratory data and went over findings with patient  Significant for:    CBC RESULTS:  Recent Labs   Lab Test 09/28/18  0626 09/07/18  1634 11/29/11  0857   WBC 8.2 6.2 5.1   HGB 15.0 15.6 15.8   * 156 173        BMP RESULTS:  Recent Labs   Lab Test 10/23/18  1026 09/28/18  0626 09/07/18  1634 07/13/14 07/12/14 11/29/11  0857    137 140  --   --  142   POTASSIUM 4.4 4.9 4.0 4.0  --  4.9   CHLORIDE 107 " 106 104  --   --  102   CO2 28 24 30  --   --  26   ANIONGAP 5 7 5  --   --  14   GLC 94 179* 83  --   --  99   BUN 14 19 17  --   --  17   CR 1.03 1.22 0.98  --  0.95 1.06   GFRESTIMATED 72 59* 76  --  >60 71   GFRESTBLACK 87 72 >90  --  >60 86   NUNU 8.8 8.3* 8.6  --   --  9.9       UA RESULTS:   Recent Labs   Lab Test 10/23/18  0845 09/07/18  1658 08/03/18  1053   SG 1.008 1.009 1.020   URINEPH 7.0 5.0 5.5   NITRITE Negative Negative Negative   RBCU 4* 7* >100*   WBCU 32* 62* *       PSA RESULTS  PSA   Date Value Ref Range Status   09/07/2018 13.80 (H) 0 - 4 ug/L Final     Comment:     Assay Method:  Chemiluminescence using Siemens Vista analyzer   06/13/2018 11.40 (H) 0 - 4 ug/L Final     Comment:     Assay Method:  Chemiluminescence using Siemens Vista analyzer   01/12/2018 11.90 (H) 0 - 4 ug/L Final     Comment:     Assay Method:  Chemiluminescence using Siemens Vista analyzer   12/05/2017 10.70 (H) 0 - 4 ug/L Final     Comment:     Assay Method:  Chemiluminescence using Siemens Vista analyzer           Imaging:    I personally reviewed all applicable imaging and went over the below findings with patient.    Results for orders placed or performed in visit on 10/23/18   Renal US    Narrative    ULTRASOUND RENAL COMPLETE October 23, 2018 1:28 PM     HISTORY: Urinary retention.    COMPARISON: CT of the abdomen and pelvis performed 11/22/2017.    FINDINGS:     Right kidney measures 12 x 5.3 x 6.2 cm. Cortical thickness measures  1.3 cm AP. There is no hydronephrosis. No renal calculi or solid renal  masses are evident.     Left kidney measures 12.3 x 5 x 5.4 cm. Cortical thickness measures  1.5 cm AP. There is no hydronephrosis. No renal calculi or solid renal  masses are evident.     Limited images of the bladder are unremarkable. The patient attempted  to void, but could not void a significant amount. Prevoid bladder  volume was measured at 271.1 mL. Postvoid bladder volume was measured  at 324.7 mL.        Impression    IMPRESSION:   1. Large postvoid residual in the urinary bladder.  2. Otherwise unremarkable renal ultrasound examination. No  hydronephrosis.    DANNA ROMO MD              Assessment/Plan   68 year old male w/ hx of BPH/LUTS and Aleksandar 6 prostate cancer on active surveillance  - Discussed trial of Finasteride to address pt's urinary retention. Given that he is doing well symptomatically and would like to avoid further surgical intervention at this time, discussed that medical therapy would be a favorable option to try. Patient would like to proceed with this.   - PSA today. If this continues to rise, will have pt see Dr. Méndez again to revisit discussion on prostate cancer management.   -Will monitor retention/elevated PVR for upper tract obstruction.  Aware that there is a risk of upper tract damage with such a large post void residual, needs monotiring.  - UA/UC today.          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     Hyperlipidemia LDL goal <70 11/28/2011     Hypertension goal BP (blood pressure) < 140/90      NSTEMI (non-ST elevated myocardial infarction) (H) 7/2014    prob sec to PAF -- no sig CAD     PAF (paroxysmal atrial fibrillation) (H) 7/2014     Prostate cancer (H) 02/2018            Past Surgical History:     Past Surgical History:   Procedure Laterality Date     ANGIOGRAM  2014    normal - non ST MI     COLONOSCOPY       DENTAL SURGERY  2000     HERNIA REPAIR, INGUINAL RT/LT  1952     LASER HOLMIUM ENUCLEATION PROSTATE N/A 9/27/2018    Procedure: LASER HOLMIUM ENUCLEATION PROSTATE;  Holmium Laser Enucleation Of The Prostate;  Surgeon: Danna Oliveros MD;  Location: [facility] OR            Medications     Current Outpatient Medications    Medication     Ascorbic Acid (VITAMIN C PO)     ASPIRIN NOT PRESCRIBED (INTENTIONAL)     B Complex Vitamins (VITAMIN B COMPLEX PO)     rivaroxaban ANTICOAGULANT (XARELTO) 20 MG TABS tablet     vitamin E (TOCOPHEROL) 100 UNIT capsule     Zinc Gluconate (ZINC) 100 MG TABS     tamsulosin (FLOMAX) 0.4 MG capsule     No current facility-administered medications for this visit.             Family History:     Family History   Problem Relation Age of Onset     Heart Failure Mother 73     Cerebrovascular Disease Father 70     Heart Disease Father 91        Pacemaker     Kidney Disease Father         stones      No Known Problems Brother      No Known Problems Sister      Cancer - colorectal No family hx of      Prostate Cancer No family hx of      Diabetes No family hx of      Hypertension No family hx of             Social History:     Social History     Socioeconomic History     Marital status:      Spouse name: Ludmila     Number of children: 2     Years of education: Not on file     Highest education level: Not on file   Social Needs     Financial resource strain: Not on file     Food insecurity - worry: Not on file     Food insecurity - inability: Not on file     Transportation needs - medical: Not on file     Transportation needs - non-medical: Not on file   Occupational History     Occupation:      Employer: [employer]   Tobacco Use     Smoking status: Never Smoker     Smokeless tobacco: Never Used   Substance and Sexual Activity     Alcohol use: No     Alcohol/week: 0.0 oz     Drug use: No     Sexual activity: Yes     Partners: Female     Birth control/protection: Condom   Other Topics Concern     Parent/sibling w/ CABG, MI or angioplasty before 65F 55M? No   Social History Narrative     Not on file            Allergies:   Dust mites and Mold         Review of Systems:  From intake questionnaire   Negative 14 system review except as noted on HPI, nurse's note.      Scribe  Disclosure:   I,Joseph Hogan, am serving as a scribe; to document services personally performed by Henry Oliveros MD based on data collection and the provider's statements to me.     I, Henry Oliveros saw and evaluated this patient and agree with the plan as stated above.  I personally performed all listed procedures.    CC:  Marybeth Gaffney      >15 minutes were spent face to face with patient over half of which was spent providing medical counseling regarding urinary retention.

## 2018-12-18 ENCOUNTER — OFFICE VISIT (OUTPATIENT)
Dept: UROLOGY | Facility: CLINIC | Age: 68
End: 2018-12-18
Payer: COMMERCIAL

## 2018-12-18 VITALS
SYSTOLIC BLOOD PRESSURE: 121 MMHG | DIASTOLIC BLOOD PRESSURE: 77 MMHG | HEIGHT: 69 IN | HEART RATE: 60 BPM | BODY MASS INDEX: 25.79 KG/M2 | WEIGHT: 174.1 LBS

## 2018-12-18 DIAGNOSIS — R33.9 URINARY RETENTION: Primary | ICD-10-CM

## 2018-12-18 DIAGNOSIS — R33.9 URINARY RETENTION: ICD-10-CM

## 2018-12-18 LAB
ALBUMIN UR-MCNC: NEGATIVE MG/DL
APPEARANCE UR: CLEAR
BILIRUB UR QL STRIP: NEGATIVE
COLOR UR AUTO: YELLOW
GLUCOSE UR STRIP-MCNC: NEGATIVE MG/DL
HGB UR QL STRIP: NEGATIVE
KETONES UR STRIP-MCNC: NEGATIVE MG/DL
LEUKOCYTE ESTERASE UR QL STRIP: NEGATIVE
MUCOUS THREADS #/AREA URNS LPF: PRESENT /LPF
NITRATE UR QL: NEGATIVE
PH UR STRIP: 6 PH (ref 5–7)
PSA SERPL-MCNC: 9.96 UG/L (ref 0–4)
RBC #/AREA URNS AUTO: <1 /HPF (ref 0–2)
SOURCE: ABNORMAL
SP GR UR STRIP: 1.01 (ref 1–1.03)
UROBILINOGEN UR STRIP-MCNC: 0 MG/DL (ref 0–2)
WBC #/AREA URNS AUTO: <1 /HPF (ref 0–5)

## 2018-12-18 ASSESSMENT — PAIN SCALES - GENERAL: PAINLEVEL: NO PAIN (0)

## 2018-12-18 ASSESSMENT — MIFFLIN-ST. JEOR: SCORE: 1555.09

## 2018-12-18 NOTE — LETTER
"12/18/2018       RE: Andre Alas  [address]  [facility] [address]     Dear Colleague,    Thank you for referring your patient, Andre Alas, to the [facility] UROLOGY AND INST FOR PROSTATE AND UROLOGIC CANCERS at [facility]. Please see a copy of my visit note below.      UROLOGY FOLLOW-UP NOTE          Chief Complaint:   Today I had the pleasure of seeing . Andre Alas in follow-up for a chief complaint of LUTS in the setting of large gland prostatic hypertrophy with low risk prostate cancer         Interval Update    Andre Alas is a very pleasant 68 year old male     Brief  History:   Mr. Alas has a history of severe LUTS From prostatic enlargement and hx of [place] 6 prostate cancer. He elected to proceed with surveillance of prostate cancer but wanted to address his urinary retention without radical surgery.      He underwent HoLEP (median lobe only) on 9/27/18. Decision was made to only treat median lobe as it was ball valving and the goal was to resolve retention rather than more advanced resection/enucleation which could make possible prostatectomy more challenging in the future. 13.9 gms of tissue were removed, pathology was benign.    Today notes:   He is doing well. He reports improved urinary control. His nocturia has almost completely resolved (at most 1x per night). He does continue to endorse intermittency and weak stream.     PVR today was 419 ml. He is not catheterizing.    He discontinued Flomax as he believed it increased his urinary leakage/incontinence.          Physical Exam:   Patient is a 68 year old  male   Vitals: Blood pressure 121/77, pulse 60, height 1.753 m (5' 9\"), weight 79 kg (174 lb 1.6 oz).  Notable Findings on Exam well-nourished male in no apparent distress        Labs and Pathology:    I personally reviewed all applicable laboratory data and went over findings with patient  Significant for:    CBC RESULTS:  Recent " Labs   Lab Test 09/28/18  0626 09/07/18  1634 11/29/11  0857   WBC 8.2 6.2 5.1   HGB 15.0 15.6 15.8   * 156 173        BMP RESULTS:  Recent Labs   Lab Test 10/23/18  1026 09/28/18  0626 09/07/18  1634 07/13/14 07/12/14 11/29/11  0857    137 140  --   --  142   POTASSIUM 4.4 4.9 4.0 4.0  --  4.9   CHLORIDE 107 106 104  --   --  102   CO2 28 24 30  --   --  26   ANIONGAP 5 7 5  --   --  14   GLC 94 179* 83  --   --  99   BUN 14 19 17  --   --  17   CR 1.03 1.22 0.98  --  0.95 1.06   GFRESTIMATED 72 59* 76  --  >60 71   GFRESTBLACK 87 72 >90  --  >60 86   NUNU 8.8 8.3* 8.6  --   --  9.9       UA RESULTS:   Recent Labs   Lab Test 10/23/18  0845 09/07/18  1658 08/03/18  1053   SG 1.008 1.009 1.020   URINEPH 7.0 5.0 5.5   NITRITE Negative Negative Negative   RBCU 4* 7* >100*   WBCU 32* 62* *       PSA RESULTS  PSA   Date Value Ref Range Status   09/07/2018 13.80 (H) 0 - 4 ug/L Final     Comment:     Assay Method:  Chemiluminescence using Siemens Vista analyzer   06/13/2018 11.40 (H) 0 - 4 ug/L Final     Comment:     Assay Method:  Chemiluminescence using Siemens Vista analyzer   01/12/2018 11.90 (H) 0 - 4 ug/L Final     Comment:     Assay Method:  Chemiluminescence using Siemens Vista analyzer   12/05/2017 10.70 (H) 0 - 4 ug/L Final     Comment:     Assay Method:  Chemiluminescence using Siemens Vista analyzer       Imaging:    I personally reviewed all applicable imaging and went over the below findings with patient.    Results for orders placed or performed in visit on 10/23/18   Renal US    Narrative    ULTRASOUND RENAL COMPLETE October 23, 2018 1:28 PM     HISTORY: Urinary retention.    COMPARISON: CT of the abdomen and pelvis performed 11/22/2017.    FINDINGS:     Right kidney measures 12 x 5.3 x 6.2 cm. Cortical thickness measures  1.3 cm AP. There is no hydronephrosis. No renal calculi or solid renal  masses are evident.     Left kidney measures 12.3 x 5 x 5.4 cm. Cortical thickness measures  1.5  cm AP. There is no hydronephrosis. No renal calculi or solid renal  masses are evident.     Limited images of the bladder are unremarkable. The patient attempted  to void, but could not void a significant amount. Prevoid bladder  volume was measured at 271.1 mL. Postvoid bladder volume was measured  at 324.7 mL.       Impression    IMPRESSION:   1. Large postvoid residual in the urinary bladder.  2. Otherwise unremarkable renal ultrasound examination. No  hydronephrosis.    DANNA ROMO MD              Assessment/Plan   68 year old male w/ hx of BPH/LUTS and [place] 6 prostate cancer on active surveillance  - Discussed trial of Finasteride to address pt's urinary retention. Given that he is doing well symptomatically and would like to avoid further surgical intervention at this time, discussed that medical therapy would be a favorable option to try. Patient would like to proceed with this.   - PSA today. If this continues to rise, will have pt see Dr. Méndez again to revisit discussion on prostate cancer management.   -Will monitor retention/elevated PVR for upper tract obstruction.  Aware that there is a risk of upper tract damage with such a large post void residual, needs monotiring.  - UA/UC today.          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     Hyperlipidemia LDL goal <70 11/28/2011     Hypertension goal BP (blood pressure) < 140/90      NSTEMI (non-ST elevated myocardial infarction) (H) 7/2014    prob sec to PAF -- no sig CAD     PAF (paroxysmal atrial fibrillation) (H) 7/2014     Prostate cancer (H) 02/2018            Past Surgical History:     Past Surgical History:   Procedure Laterality Date     ANGIOGRAM  2014    normal - non ST MI     COLONOSCOPY       DENTAL  SURGERY  2000     HERNIA REPAIR, INGUINAL RT/LT  1952     LASER HOLMIUM ENUCLEATION PROSTATE N/A 9/27/2018    Procedure: LASER HOLMIUM ENUCLEATION PROSTATE;  Holmium Laser Enucleation Of The Prostate;  Surgeon: Henry Oliveros MD;  Location: [facility] OR            Medications     Current Outpatient Medications   Medication     Ascorbic Acid (VITAMIN C PO)     ASPIRIN NOT PRESCRIBED (INTENTIONAL)     B Complex Vitamins (VITAMIN B COMPLEX PO)     rivaroxaban ANTICOAGULANT (XARELTO) 20 MG TABS tablet     vitamin E (TOCOPHEROL) 100 UNIT capsule     Zinc Gluconate (ZINC) 100 MG TABS     tamsulosin (FLOMAX) 0.4 MG capsule     No current facility-administered medications for this visit.             Family History:     Family History   Problem Relation Age of Onset     Heart Failure Mother 73     Cerebrovascular Disease Father 70     Heart Disease Father 91        Pacemaker     Kidney Disease Father         stones      No Known Problems Brother      No Known Problems Sister      Cancer - colorectal No family hx of      Prostate Cancer No family hx of      Diabetes No family hx of      Hypertension No family hx of             Social History:     Social History     Socioeconomic History     Marital status:      Spouse name: Ludmila     Number of children: 2     Years of education: Not on file     Highest education level: Not on file   Social Needs     Financial resource strain: Not on file     Food insecurity - worry: Not on file     Food insecurity - inability: Not on file     Transportation needs - medical: Not on file     Transportation needs - non-medical: Not on file   Occupational History     Occupation:      Employer: [employer]   Tobacco Use     Smoking status: Never Smoker     Smokeless tobacco: Never Used   Substance and Sexual Activity     Alcohol use: No     Alcohol/week: 0.0 oz     Drug use: No     Sexual activity: Yes     Partners: Female     Birth control/protection: Condom    Other Topics Concern     Parent/sibling w/ CABG, MI or angioplasty before 65F 55M? No   Social History Narrative     Not on file          Allergies:   Dust mites and Mold    Scribe Disclosure:   I,Marupatrica Edison, am serving as a scribe; to document services personally performed by Henry Oliveros MD based on data collection and the provider's statements to me.     I, Henry Oliveros saw and evaluated this patient and agree with the plan as stated above.  I personally performed all listed procedures.    CC:  Marybeth Gaffney    >15 minutes were spent face to face with patient over half of which was spent providing medical counseling regarding urinary retention.    Again, thank you for allowing me to participate in the care of your patient.      Sincerely,    Henry Oliveros MD

## 2018-12-18 NOTE — PATIENT INSTRUCTIONS
Check PSA and urine today.  Dr. Oliveros will notify you of the results.    It was a pleasure meeting with you today.  Thank you for allowing me and my team the privilege of caring for you today.  YOU are the reason we are here, and I truly hope we provided you with the excellent service you deserve.  Please let us know if there is anything else we can do for you so that we can be sure you are leaving completely satisfied with your care experience.        OVIDIO Boswell

## 2018-12-18 NOTE — NURSING NOTE
"Chief Complaint   Patient presents with     RECHECK     2 mo F/U with PVR       Blood pressure 121/77, pulse 60, height 1.753 m (5' 9\"), weight 79 kg (174 lb 1.6 oz). Body mass index is 25.71 kg/m .    Patient Active Problem List   Diagnosis     Hyperlipidemia LDL goal <70     Allergic rhinitis due to dust     Paroxysmal atrial fibrillation (H)     Old myocardial infarction     History of CVA (cerebrovascular accident)     Advanced directives, counseling/discussion     Prostate cancer (H)     History of colonic polyps     Hypertension goal BP (blood pressure) < 140/90     BPH (benign prostatic hyperplasia)     Atrioventricular canal (AVC), incomplete     Chest pain     NSTEMI (non-ST elevated myocardial infarction) (H)     Syncope       Allergies   Allergen Reactions     Dust Mites      Mold        Current Outpatient Medications   Medication Sig Dispense Refill     Ascorbic Acid (VITAMIN C PO) Takes total of 3 tablets every 2 weeks (divided)       ASPIRIN NOT PRESCRIBED (INTENTIONAL) Please choose reason not prescribed, below 0 each 0     B Complex Vitamins (VITAMIN B COMPLEX PO) Takes total of 3 tablets every 2 weeks (divided)       rivaroxaban ANTICOAGULANT (XARELTO) 20 MG TABS tablet Take 15 mg by mouth daily (with dinner)        vitamin E (TOCOPHEROL) 100 UNIT capsule Takes total of 3 tablets every 2 weeks (divided)       Zinc Gluconate (ZINC) 100 MG TABS Takes total of 3 tablets every 2 weeks (divided)       tamsulosin (FLOMAX) 0.4 MG capsule Take 1 capsule (0.4 mg) by mouth daily (Patient not taking: Reported on 12/18/2018) 30 capsule 1       Social History     Tobacco Use     Smoking status: Never Smoker     Smokeless tobacco: Never Used   Substance Use Topics     Alcohol use: No     Alcohol/week: 0.0 oz     Drug use: No       Ingrid Swann LPN  12/18/2018  8:44 AM       "

## 2018-12-19 NOTE — NURSING NOTE
Patient was seen in clinic today for a PVR per Dr. Oliveros request. Bladder was scanned bladder had <50cc remaining urine. Dr. Oliveros notified. No catheter needed as this time. Dr. Oliveros stopped in room to say hello to patient.

## 2018-12-19 NOTE — NURSING NOTE
Andre Liratrey comes into clinic today at the request of   Dr. Oliveros Ordering Provider for PVR. Patient urinated in the bathroom. Bladder was scanned revealed <50ml. Patient tolerated the procedure well. Dr. Oliveros was informed and advised that a catheter was not needed. Dr. Oliveros came into the room to say hello to the patient.    This service provided today was under the supervising provider of the day Dr. Oliveros, who was available if needed.    Suellen Wilks

## 2019-01-04 ENCOUNTER — TELEPHONE (OUTPATIENT)
Dept: UROLOGY | Facility: CLINIC | Age: 69
End: 2019-01-04

## 2019-01-04 NOTE — TELEPHONE ENCOUNTER
[facility] Call Center    Phone Message    May a detailed message be left on voicemail: yes    Reason for Call: Other: Per call from PT's wife is requesting for lab results from 12/18 and plans. Per Wife okay to leave detailed message at the above #     Action Taken: Message routed to:  [facility] Urology

## 2019-01-05 NOTE — TELEPHONE ENCOUNTER
[facility] Call Center    Phone Message    May a detailed message be left on voicemail: yes    Reason for Call: Other: Pt called again to find out if results were back from PSA Test done a couple weeks ago. Please call with an update.      Action Taken: Message routed to:  [facility] Urology Clinic

## 2021-01-29 ENCOUNTER — TELEPHONE (OUTPATIENT)
Dept: FAMILY MEDICINE | Facility: CLINIC | Age: 71
End: 2021-01-29

## 2021-01-29 NOTE — TELEPHONE ENCOUNTER
Reason for Call: Request for an order or referral:    Order or referral being requested: MR angio body with contrast     Date needed: as soon as possible    Has the patient been seen by the PCP for this problem? unknown    Additional comments: procedure 2/9/21    Phone number Patient can be reached at:  Other phone number:  na    Best Time:  7am -530 pm    Can we leave a detailed message on this number?  YES    Call taken on 1/29/2021 at 3:00 PM by Esvin Padgett

## 2021-02-01 ENCOUNTER — VIRTUAL VISIT (OUTPATIENT)
Dept: FAMILY MEDICINE | Facility: CLINIC | Age: 71
End: 2021-02-01
Payer: COMMERCIAL

## 2021-02-01 DIAGNOSIS — Z86.73 HISTORY OF CVA (CEREBROVASCULAR ACCIDENT): ICD-10-CM

## 2021-02-01 DIAGNOSIS — E78.5 HYPERLIPIDEMIA LDL GOAL <70: ICD-10-CM

## 2021-02-01 DIAGNOSIS — R41.3 MEMORY PROBLEM: ICD-10-CM

## 2021-02-01 DIAGNOSIS — C61 MALIGNANT NEOPLASM OF PROSTATE (H): ICD-10-CM

## 2021-02-01 DIAGNOSIS — Z11.59 NEED FOR HEPATITIS C SCREENING TEST: ICD-10-CM

## 2021-02-01 DIAGNOSIS — I48.0 PAF (PAROXYSMAL ATRIAL FIBRILLATION) (H): Primary | ICD-10-CM

## 2021-02-01 PROCEDURE — 99214 OFFICE O/P EST MOD 30 MIN: CPT | Mod: TEL | Performed by: FAMILY MEDICINE

## 2021-02-01 RX ORDER — APIXABAN 5 MG/1
5 TABLET, FILM COATED ORAL 2 TIMES DAILY
COMMUNITY
Start: 2021-01-07 | End: 2022-02-03

## 2021-02-01 RX ORDER — LISINOPRIL 5 MG/1
5 TABLET ORAL 2 TIMES DAILY
COMMUNITY
Start: 2021-01-31

## 2021-02-01 NOTE — PATIENT INSTRUCTIONS
Get the shingles vaccine, called Shingrix (given as 2 shots, 2 to 6 months apart), even if you have already had the Zostavax vaccine. Discuss getting the Shingix vaccine from your pharmacist, or schedule an ancillary shot visit here. Some insurances do not cover the cost of these vaccines.

## 2021-02-01 NOTE — TELEPHONE ENCOUNTER
Called [facility]. Pt has been seen  by a cardiologist through [facility]. Cardiologist ordered an MRI cardiac.   Pt's insurance requests that referral be placed by PCP for MRI at [facility] otherwise [facility] will not cover this.   If approved, please place this on file with preferred      Marilynn Ahumada RN  [facility]

## 2021-02-01 NOTE — PROGRESS NOTES
Andre is a 70 year old who is being evaluated via a billable telephone visit.      What phone number would you like to be contacted at? [phone]  How would you like to obtain your AVS? MyChart  Assessment & Plan     PAF (paroxysmal atrial fibrillation) (H)  History of CVA (cerebrovascular accident)  -imaging order requested by cardiologist must be ordered by PCP per payor   - MRI Cardiac w/contrast; Future (already scheduled at [facility])   -rate controlled and anticoagulated     Malignant neoplasm of prostate (H)  -follow-up with urology as planned   - PROSTATE SPEC ANTIGEN SCREEN; Future    Hyperlipidemia LDL goal <70  - Lipid panel reflex to direct LDL Fasting; Future    Memory problem  - CBC with platelets and differential; Future  - TSH with free T4 reflex; Future  - Vitamin B12; Future  -follow-up for wellness visit and to consider imaging or referral    Need for hepatitis C screening test  - Hepatitis C Screen Reflex to HCV RNA Quant and Genotype; Future    Review of external notes as documented above                      See Patient Instructions    Return in about 1 month (around 3/1/2021) for Wellness visit.    Marybeth Gaffney MD  [facility]    Kayley Powell is a 70 year old who presents to clinic today for the following health issues     HPI       Chief Complaint   Patient presents with     Referral       Hypertension Follow-up      Do you check your blood pressure regularly outside of the clinic? Yes     Are your blood pressures ever more than 140 on the top number (systolic) OR more   than 90 on the bottom number (diastolic), for example 140/90? No    Cerebrovascular Follow-up      Patient history: ischemic cerebrovascular incident    Residual symptoms: None    Worsened or new symptoms since last visit: No    Daily aspirin use: No    Hypertension controlled: Yes      Review of Systems   CONSTITUTIONAL: NEGATIVE for fever, chills, change in weight  RESP: NEGATIVE for  significant cough or SOB  CV: NEGATIVE for chest pain, palpitations or peripheral edema  NEURO: Hx CVA and memory problems  HEME: NEGATIVE for bleeding problems      Objective           Vitals:  No vitals were obtained today due to virtual visit.    Physical Exam   healthy, alert and no distress  PSYCH: Alert and oriented times 3; coherent speech, normal   rate and volume, able to articulate logical thoughts, able   to abstract reason, no tangential thoughts, no hallucinations   or delusions  His affect is normal  RESP: No cough, no audible wheezing, able to talk in full sentences  Remainder of exam unable to be completed due to telephone visits    Orders Only on 12/18/2018   Component Date Value Ref Range Status     Color Urine 12/18/2018 Yellow   Final     Appearance Urine 12/18/2018 Clear   Final     Glucose Urine 12/18/2018 Negative  NEG^Negative mg/dL Final     Bilirubin Urine 12/18/2018 Negative  NEG^Negative Final     Ketones Urine 12/18/2018 Negative  NEG^Negative mg/dL Final     Specific Gravity Urine 12/18/2018 1.012  1.003 - 1.035 Final     Blood Urine 12/18/2018 Negative  NEG^Negative Final     pH Urine 12/18/2018 6.0  5.0 - 7.0 pH Final     Protein Albumin Urine 12/18/2018 Negative  NEG^Negative mg/dL Final     Urobilinogen mg/dL 12/18/2018 0.0  0.0 - 2.0 mg/dL Final     Nitrite Urine 12/18/2018 Negative  NEG^Negative Final     Leukocyte Esterase Urine 12/18/2018 Negative  NEG^Negative Final     Source 12/18/2018 Midstream Urine   Final     WBC Urine 12/18/2018 <1  0 - 5 /HPF Final     RBC Urine 12/18/2018 <1  0 - 2 /HPF Final     Mucous Urine 12/18/2018 Present* NEG^Negative /LPF Final     PSA 12/18/2018 9.96* 0 - 4 ug/L Final    Assay Method:  Chemiluminescence using Siemens Vista analyzer               Phone call duration: 13 minutes

## 2021-02-08 ENCOUNTER — TELEPHONE (OUTPATIENT)
Dept: FAMILY MEDICINE | Facility: CLINIC | Age: 71
End: 2021-02-08

## 2021-02-08 DIAGNOSIS — I48.0 PAROXYSMAL ATRIAL FIBRILLATION (H): ICD-10-CM

## 2021-02-08 DIAGNOSIS — Q21.21 ATRIOVENTRICULAR CANAL (AVC), INCOMPLETE: ICD-10-CM

## 2021-02-08 DIAGNOSIS — Z86.73 HISTORY OF CVA (CEREBROVASCULAR ACCIDENT): Primary | ICD-10-CM

## 2021-02-08 NOTE — TELEPHONE ENCOUNTER
[facility] called and pt's insurance still doesn't have the referral from us. Please re-fax referral to [facility]

## 2021-02-08 NOTE — TELEPHONE ENCOUNTER
Please see encounter from 1/29/21 and other encounter from today.    Marilynn Ahumada RN  [facility]

## 2021-02-08 NOTE — TELEPHONE ENCOUNTER
Patient has appointment tomorrow 2/9/21 and MRI is still in pending status -- if this is not sent over and taken care of MRI will be canceled for tomorrow and patient needs it for heart related issues.     Please call Ka when this is taken care of at [phone]    Also call patient when done at [phone]

## 2021-02-08 NOTE — TELEPHONE ENCOUNTER
Order was placed at appointment on 2/1/21 - I also entered into a cardiology referral today to see if this helps - please fax or send as required  Marybeth Gaffney MD

## 2021-02-09 NOTE — TELEPHONE ENCOUNTER
Called and spoke with rep Hurley at [facility] as I was unable to enter the referral into the website - it timed me out every 15 seconds. I gave her the information for both referrals and she sent the info to Kelley who will enter them into their system. They should be viewable to me by the end of the day and she gave me call ref# [id number]

## 2021-04-20 ENCOUNTER — IMMUNIZATION (OUTPATIENT)
Dept: NURSING | Facility: CLINIC | Age: 71
End: 2021-04-20
Payer: COMMERCIAL

## 2021-04-20 PROCEDURE — 0001A PR COVID VAC PFIZER DIL RECON 30 MCG/0.3 ML IM: CPT

## 2021-04-20 PROCEDURE — 91300 PR COVID VAC PFIZER DIL RECON 30 MCG/0.3 ML IM: CPT

## 2021-05-11 ENCOUNTER — IMMUNIZATION (OUTPATIENT)
Dept: NURSING | Facility: CLINIC | Age: 71
End: 2021-05-11
Attending: FAMILY MEDICINE
Payer: COMMERCIAL

## 2021-05-11 PROCEDURE — 0002A PR COVID VAC PFIZER DIL RECON 30 MCG/0.3 ML IM: CPT

## 2021-05-11 PROCEDURE — 91300 PR COVID VAC PFIZER DIL RECON 30 MCG/0.3 ML IM: CPT

## 2021-07-13 ENCOUNTER — OFFICE VISIT (OUTPATIENT)
Dept: FAMILY MEDICINE | Facility: CLINIC | Age: 71
End: 2021-07-13
Payer: COMMERCIAL

## 2021-07-13 VITALS
OXYGEN SATURATION: 97 % | BODY MASS INDEX: 25.42 KG/M2 | DIASTOLIC BLOOD PRESSURE: 77 MMHG | HEIGHT: 69 IN | HEART RATE: 70 BPM | SYSTOLIC BLOOD PRESSURE: 123 MMHG | WEIGHT: 171.6 LBS | TEMPERATURE: 98.2 F

## 2021-07-13 DIAGNOSIS — C61 PROSTATE CANCER (H): ICD-10-CM

## 2021-07-13 DIAGNOSIS — Q21.21 ATRIOVENTRICULAR CANAL (AVC), INCOMPLETE: ICD-10-CM

## 2021-07-13 DIAGNOSIS — Z00.00 ENCOUNTER FOR MEDICARE ANNUAL WELLNESS EXAM: Primary | ICD-10-CM

## 2021-07-13 DIAGNOSIS — I10 HYPERTENSION GOAL BP (BLOOD PRESSURE) < 140/90: ICD-10-CM

## 2021-07-13 DIAGNOSIS — E78.5 HYPERLIPIDEMIA LDL GOAL <70: ICD-10-CM

## 2021-07-13 DIAGNOSIS — Z23 NEED FOR PNEUMOCOCCAL VACCINATION: ICD-10-CM

## 2021-07-13 DIAGNOSIS — I48.0 PAROXYSMAL ATRIAL FIBRILLATION (H): ICD-10-CM

## 2021-07-13 DIAGNOSIS — Z86.0100 HISTORY OF COLONIC POLYPS: ICD-10-CM

## 2021-07-13 DIAGNOSIS — Z86.73 HISTORY OF CVA (CEREBROVASCULAR ACCIDENT): ICD-10-CM

## 2021-07-13 PROCEDURE — 90732 PPSV23 VACC 2 YRS+ SUBQ/IM: CPT | Performed by: FAMILY MEDICINE

## 2021-07-13 PROCEDURE — 90471 IMMUNIZATION ADMIN: CPT | Performed by: FAMILY MEDICINE

## 2021-07-13 PROCEDURE — 99397 PER PM REEVAL EST PAT 65+ YR: CPT | Mod: 25 | Performed by: FAMILY MEDICINE

## 2021-07-13 ASSESSMENT — ENCOUNTER SYMPTOMS
PALPITATIONS: 0
ABDOMINAL PAIN: 0
WEAKNESS: 0
JOINT SWELLING: 0
ARTHRALGIAS: 0
HEARTBURN: 0
DIARRHEA: 0
DIZZINESS: 0
CONSTIPATION: 0
MYALGIAS: 0
EYE PAIN: 0
SORE THROAT: 0
NAUSEA: 0
DYSURIA: 0
CHILLS: 0
COUGH: 0
SHORTNESS OF BREATH: 0
FEVER: 0
HEMATURIA: 0
HEMATOCHEZIA: 0
FREQUENCY: 0
NERVOUS/ANXIOUS: 0
HEADACHES: 0
PARESTHESIAS: 0

## 2021-07-13 ASSESSMENT — MIFFLIN-ST. JEOR: SCORE: 1533.75

## 2021-07-13 ASSESSMENT — ACTIVITIES OF DAILY LIVING (ADL): CURRENT_FUNCTION: NO ASSISTANCE NEEDED

## 2021-07-13 NOTE — PATIENT INSTRUCTIONS
Patient Education   Personalized Prevention Plan  You are due for the preventive services outlined below.  Your care team is available to assist you in scheduling these services.  If you have already completed any of these items, please share that information with your care team to update in your medical record.  Health Maintenance Due   Topic Date Due     ANNUAL REVIEW OF HM ORDERS  Never done     Hepatitis C Screening  Never done     Zoster (Shingles) Vaccine (1 of 2) Never done     Annual Wellness Visit  08/30/2016     Pneumococcal Vaccine (1 of 2 - PPSV23) 04/19/2018     Discuss Advance Care Planning  08/07/2019     Cholesterol Lab  09/07/2019     Prostate Test  12/18/2019

## 2021-07-13 NOTE — NURSING NOTE
Prior to immunization administration, verified patients identity using patient s name and date of birth. Please see Immunization Activity for additional information.     Screening Questionnaire for Adult Immunization    Are you sick today?   No   Do you have allergies to medications, food, a vaccine component or latex?   No   Have you ever had a serious reaction after receiving a vaccination?   No   Do you have a long-term health problem with heart, lung, kidney, or metabolic disease (e.g., diabetes), asthma, a blood disorder, no spleen, complement component deficiency, a cochlear implant, or a spinal fluid leak?  Are you on long-term aspirin therapy?   No   Do you have cancer, leukemia, HIV/AIDS, or any other immune system problem?   No   Do you have a parent, brother, or sister with an immune system problem?   No   In the past 3 months, have you taken medications that affect  your immune system, such as prednisone, other steroids, or anticancer drugs; drugs for the treatment of rheumatoid arthritis, Crohn s disease, or psoriasis; or have you had radiation treatments?   No   Have you had a seizure, or a brain or other nervous system problem?   No   During the past year, have you received a transfusion of blood or blood    products, or been given immune (gamma) globulin or antiviral drug?   No   For women: Are you pregnant or is there a chance you could become       pregnant during the next month?   No   Have you received any vaccinations in the past 4 weeks?   No     Immunization questionnaire answers were all negative.        Per orders of Dr. Aquino, injection of Pneumovax 23 given by Yeni Sy CMA. Patient instructed to remain in clinic for 15 minutes afterwards, and to report any adverse reaction to me immediately.  Vaccine information supplied.       Screening performed by Yeni Sy CMA on 7/13/2021 at 10:59 AM.

## 2021-07-13 NOTE — PROGRESS NOTES
"SUBJECTIVE:   Andre Alas is a 70 year old male who presents for a Preventive Visit and follow-up on baseline health conditions.  His PCP is listed as Dr. Gaffney.  I have seen him a few times in the past as well, the last time about 3-1/2 years ago.    Patient has been advised of split billing requirements and indicates understanding: Yes   Are you in the first 12 months of your Medicare coverage?  No    Healthy Habits:     In general, how would you rate your overall health?  Good    Frequency of exercise:  6-7 days/week    Duration of exercise:  15-30 minutes    Do you usually eat at least 4 servings of fruit and vegetables a day, include whole grains    & fiber and avoid regularly eating high fat or \"junk\" foods?  Yes    Taking medications regularly:  Yes    Medication side effects:  None    Ability to successfully perform activities of daily living:  No assistance needed    Home Safety:  No safety concerns identified    Hearing Impairment:  Difficulty following a conversation in a noisy restaurant or crowded room    In the past 6 months, have you been bothered by leaking of urine?  No    In general, how would you rate your overall mental or emotional health?  Excellent      PHQ-2 Total Score: 0    Additional concerns today:  No    Do you feel safe in your environment? Yes    Have you ever done Advance Care Planning? (For example, a Health Directive, POLST, or a discussion with a medical provider or your loved ones about your wishes): No, advance care planning information given to patient to review.  Patient declined advance care planning discussion at this time.       Fall risk  Fallen 2 or more times in the past year?: No  Any fall with injury in the past year?: No    Cognitive Screening   1) Repeat 3 items (Leader, Season, Table)    2) Clock draw: NORMAL  3) 3 item recall: Recalls 3 objects  Results: 3 items recalled: COGNITIVE IMPAIRMENT LESS LIKELY    Mini-CogTM Copyright S Norah. Licensed by the " author for use in [facility]; reprinted with permission (dainapineda@[facility]). All rights reserved.      Do you have sleep apnea, excessive snoring or daytime drowsiness?: yes    Reviewed and updated as needed this visit by clinical staff                 Reviewed and updated as needed this visit by Provider                Social History     Tobacco Use     Smoking status: Never Smoker     Smokeless tobacco: Never Used   Substance Use Topics     Alcohol use: No     Alcohol/week: 0.0 standard drinks         Alcohol Use 7/13/2021   Prescreen: >3 drinks/day or >7 drinks/week? Not Applicable   Prescreen: >3 drinks/day or >7 drinks/week? -     He has been seeing cardiology for history of PAF and cardiac abnormalities.  He is followed by an annual MRI scan of his heart because of an atrioventricular canal defect.  See details in chart regarding this.  He has a history of prostate cancer, but he has chosen to not have any specific treatment for that.  Urinary stream is reasonable at this time.  He is on baseline medications as below.    He had a virtual visit with his PCP 5 months ago and some lab tests were ordered, but he still has not had those done.  He is not fasting today.    Review of his chart shows that he is due for a Pneumovax 23 vaccine.  He has a history of colon polyps, but is up-to-date on colonoscopy.    He is still working.  He is  and has a couple of adult children.  He stays physically active.    He had mentioned some memory concerns to Dr. Gaffney back in February, but those may be somewhat better.  He does still deal some erectile dysfunction.  He had tried holding the lisinopril for a short time, but that did not seem to make a difference, so he went back on it.      Current providers sharing in care for this patient include:   Patient Care Team:  Marybeth Gaffney MD as PCP - General (Family Practice)  Marybeth Gaffney MD as Assigned PCP    The [facility]  maintenance items are reviewed in Epic and correct as of today:  Health Maintenance Due   Topic Date Due     ANNUAL REVIEW OF HM ORDERS  Never done     HEPATITIS C SCREENING  Never done     ZOSTER IMMUNIZATION (1 of 2) Never done     MEDICARE ANNUAL WELLNESS VISIT  08/30/2016     Pneumococcal Vaccine: 65+ Years (1 of 2 - PPSV23) 04/19/2018     ADVANCE CARE PLANNING  08/07/2019     LIPID  09/07/2019     PSA  12/18/2019     Patient Active Problem List   Diagnosis     Hyperlipidemia LDL goal <70     Allergic rhinitis due to dust     Paroxysmal atrial fibrillation (H)     History of CVA (cerebrovascular accident)     Advanced directives, counseling/discussion     Prostate cancer (H)     History of colonic polyps     Hypertension goal BP (blood pressure) < 140/90     BPH (benign prostatic hyperplasia)     Atrioventricular canal (AVC), incomplete     Memory problem     Past Surgical History:   Procedure Laterality Date     ANGIOGRAM  2014    normal - non ST MI     COLONOSCOPY       DENTAL SURGERY  2000     HERNIA REPAIR, INGUINAL RT/LT  1952     LASER HOLMIUM ENUCLEATION PROSTATE N/A 9/27/2018    Procedure: LASER HOLMIUM ENUCLEATION PROSTATE;  Holmium Laser Enucleation Of The Prostate;  Surgeon: Henry Oliveros MD;  Location:  OR       Social History     Tobacco Use     Smoking status: Never Smoker     Smokeless tobacco: Never Used   Substance Use Topics     Alcohol use: No     Alcohol/week: 0.0 standard drinks     Family History   Problem Relation Age of Onset     Heart Failure Mother 73     Cerebrovascular Disease Father 70     Heart Disease Father 91        Pacemaker     Kidney Disease Father         stones      No Known Problems Brother      No Known Problems Sister      Cancer - colorectal No family hx of      Prostate Cancer No family hx of      Diabetes No family hx of      Hypertension No family hx of          Current Outpatient Medications   Medication Sig Dispense Refill     Ascorbic Acid (VITAMIN C PO)  "Takes total of 3 tablets every 2 weeks (divided)       ASPIRIN NOT PRESCRIBED (INTENTIONAL) Please choose reason not prescribed, below 0 each 0     B Complex Vitamins (VITAMIN B COMPLEX PO) Takes total of 3 tablets every 2 weeks (divided)       ELIQUIS ANTICOAGULANT 5 MG tablet 5 mg 2 times daily       lisinopril (ZESTRIL) 5 MG tablet Take 5 mg by mouth 2 times daily        vitamin E (TOCOPHEROL) 100 UNIT capsule Takes total of 3 tablets every 2 weeks (divided)       Zinc Gluconate (ZINC) 100 MG TABS Takes total of 3 tablets every 2 weeks (divided)       Allergies   Allergen Reactions     Dust Mites      Mold          Review of Systems   Constitutional: Negative for chills and fever.   HENT: Negative for congestion, ear pain, hearing loss and sore throat.    Eyes: Negative for pain and visual disturbance.   Respiratory: Negative for cough and shortness of breath.    Cardiovascular: Negative for chest pain, palpitations and peripheral edema.   Gastrointestinal: Negative for abdominal pain, constipation, diarrhea, heartburn, hematochezia and nausea.   Genitourinary: Positive for impotence. Negative for discharge, dysuria, frequency, genital sores, hematuria and urgency.   Musculoskeletal: Negative for arthralgias, joint swelling and myalgias.   Skin: Negative for rash.   Neurological: Negative for dizziness, weakness, headaches and paresthesias.   Psychiatric/Behavioral: Negative for mood changes. The patient is not nervous/anxious.          OBJECTIVE:   /77 (BP Location: Left arm, Patient Position: Sitting, Cuff Size: Adult Regular)   Pulse 70   Temp 98.2  F (36.8  C) (Oral)   Ht 1.753 m (5' 9\")   Wt 77.8 kg (171 lb 9.6 oz)   SpO2 97%   BMI 25.34 kg/m   Estimated body mass index is 25.34 kg/m  as calculated from the following:    Height as of this encounter: 1.753 m (5' 9\").    Weight as of this encounter: 77.8 kg (171 lb 9.6 oz).  Physical Exam  GENERAL: healthy, alert and no distress  EYES: Eyes grossly " normal to inspection, PERRL and conjunctivae and sclerae normal  HENT: Grossly normal  NECK: Supple  RESP: lungs clear to auscultation - no rales, rhonchi or wheezes  CV: regular rate and rhythm, normal S1 S2, no S3 or S4, no murmur, click or rub, no peripheral edema and peripheral pulses strong  ABDOMEN: soft, nontender, no hepatosplenomegaly, no masses   MS: no gross musculoskeletal defects noted, no edema  SKIN: no suspicious lesions or rashes  NEURO: Normal strength and tone, mentation intact and speech normal  PSYCH: mentation appears normal, affect normal/bright    Diagnostic Test Results:  Labs reviewed in Epic    ASSESSMENT / PLAN:       ICD-10-CM    1. Encounter for Medicare annual wellness exam  Z00.00    2. Atrioventricular canal (AVC), incomplete  Q21.2    3. Prostate cancer (H)  C61    4. Paroxysmal atrial fibrillation (H)  I48.0    5. Hypertension goal BP (blood pressure) < 140/90  I10 Basic metabolic panel  (Ca, Cl, CO2, Creat, Gluc, K, Na, BUN)   6. Hyperlipidemia LDL goal <70  E78.5    7. History of CVA (cerebrovascular accident)  Z86.73    8. History of colonic polyps  Z86.010    9. Need for pneumococcal vaccination  Z23 PNEUMOCOCCAL VACCINE,ADULT,SQ OR IM ([id number])     Blood pressure and other vital signs look good today  We discussed the above items  We will plan to continue his same medications  Continue annual cardiology follow-up as per the recommendation  We will have him return soon for fasting lab work as previously ordered by his PCP  I will also add a BMP to that  We will give him a Pneumovax 23 today  Plan a repeat colonoscopy in December of next year  Plan a tentative recheck in 1 year, or sooner prn    Patient has been advised of split billing requirements and indicates understanding: Yes  COUNSELING:  Reviewed preventive health counseling, as reflected in patient instructions       Regular exercise       Healthy diet/nutrition       Immunizations    Vaccinated for: Pneumococcal   "        Estimated body mass index is 25.34 kg/m  as calculated from the following:    Height as of this encounter: 1.753 m (5' 9\").    Weight as of this encounter: 77.8 kg (171 lb 9.6 oz).        He reports that he has never smoked. He has never used smokeless tobacco.      Appropriate preventive services were discussed with this patient, including applicable screening as appropriate for cardiovascular disease, diabetes, osteopenia/osteoporosis, and glaucoma.  As appropriate for age/gender, discussed screening for colorectal cancer, prostate cancer, breast cancer, and cervical cancer. Checklist reviewing preventive services available has been given to the patient.    Reviewed patients plan of care and provided an AVS. The Basic Care Plan (routine screening as documented in Health Maintenance) for Andre meets the Care Plan requirement. This Care Plan has been established and reviewed with the Patient.    Counseling Resources:  ATP IV Guidelines  Pooled Cohorts Equation Calculator  Breast Cancer Risk Calculator  Breast Cancer: Medication to Reduce Risk  FRAX Risk Assessment  ICSI Preventive Guidelines  Dietary Guidelines for Americans, 2010  USDA's MyPlate  ASA Prophylaxis  Lung CA Screening    Andrea Aquino MD  [facility]    Identified Health Risks:  "

## 2021-08-03 ENCOUNTER — LAB (OUTPATIENT)
Dept: LAB | Facility: CLINIC | Age: 71
End: 2021-08-03
Payer: COMMERCIAL

## 2021-08-03 DIAGNOSIS — Z86.73 HISTORY OF CVA (CEREBROVASCULAR ACCIDENT): ICD-10-CM

## 2021-08-03 DIAGNOSIS — E78.5 HYPERLIPIDEMIA LDL GOAL <70: ICD-10-CM

## 2021-08-03 DIAGNOSIS — Z11.59 NEED FOR HEPATITIS C SCREENING TEST: ICD-10-CM

## 2021-08-03 DIAGNOSIS — E78.5 HYPERLIPIDEMIA LDL GOAL <70: Primary | ICD-10-CM

## 2021-08-03 DIAGNOSIS — I10 HYPERTENSION GOAL BP (BLOOD PRESSURE) < 140/90: ICD-10-CM

## 2021-08-03 DIAGNOSIS — C61 MALIGNANT NEOPLASM OF PROSTATE (H): ICD-10-CM

## 2021-08-03 DIAGNOSIS — C61 PROSTATE CANCER (H): ICD-10-CM

## 2021-08-03 DIAGNOSIS — R41.3 MEMORY PROBLEM: ICD-10-CM

## 2021-08-03 PROBLEM — N40.0 BPH (BENIGN PROSTATIC HYPERPLASIA): Status: RESOLVED | Noted: 2018-09-27 | Resolved: 2021-08-03

## 2021-08-03 LAB
ANION GAP SERPL CALCULATED.3IONS-SCNC: <1 MMOL/L (ref 3–14)
BASOPHILS # BLD AUTO: 0 10E3/UL (ref 0–0.2)
BASOPHILS NFR BLD AUTO: 1 %
BUN SERPL-MCNC: 19 MG/DL (ref 7–30)
CALCIUM SERPL-MCNC: 9.1 MG/DL (ref 8.5–10.1)
CHLORIDE BLD-SCNC: 108 MMOL/L (ref 94–109)
CHOLEST SERPL-MCNC: 176 MG/DL
CO2 SERPL-SCNC: 31 MMOL/L (ref 20–32)
CREAT SERPL-MCNC: 1.22 MG/DL (ref 0.66–1.25)
EOSINOPHIL # BLD AUTO: 0.2 10E3/UL (ref 0–0.7)
EOSINOPHIL NFR BLD AUTO: 3 %
ERYTHROCYTE [DISTWIDTH] IN BLOOD BY AUTOMATED COUNT: 13.6 % (ref 10–15)
FASTING STATUS PATIENT QL REPORTED: YES
GFR SERPL CREATININE-BSD FRML MDRD: 60 ML/MIN/1.73M2
GLUCOSE BLD-MCNC: 100 MG/DL (ref 70–99)
HCT VFR BLD AUTO: 42.7 % (ref 40–53)
HCV AB SERPL QL IA: NONREACTIVE
HDLC SERPL-MCNC: 43 MG/DL
HGB BLD-MCNC: 15.1 G/DL (ref 13.3–17.7)
LDLC SERPL CALC-MCNC: 114 MG/DL
LYMPHOCYTES # BLD AUTO: 1.7 10E3/UL (ref 0.8–5.3)
LYMPHOCYTES NFR BLD AUTO: 32 %
MCH RBC QN AUTO: 31.1 PG (ref 26.5–33)
MCHC RBC AUTO-ENTMCNC: 35.4 G/DL (ref 31.5–36.5)
MCV RBC AUTO: 88 FL (ref 78–100)
MONOCYTES # BLD AUTO: 0.4 10E3/UL (ref 0–1.3)
MONOCYTES NFR BLD AUTO: 8 %
NEUTROPHILS # BLD AUTO: 3 10E3/UL (ref 1.6–8.3)
NEUTROPHILS NFR BLD AUTO: 57 %
NONHDLC SERPL-MCNC: 133 MG/DL
PLATELET # BLD AUTO: 167 10E3/UL (ref 150–450)
POTASSIUM BLD-SCNC: 4.3 MMOL/L (ref 3.4–5.3)
PSA SERPL-MCNC: 14.6 UG/L (ref 0–4)
RBC # BLD AUTO: 4.86 10E6/UL (ref 4.4–5.9)
SODIUM SERPL-SCNC: 139 MMOL/L (ref 133–144)
TRIGL SERPL-MCNC: 96 MG/DL
TSH SERPL DL<=0.005 MIU/L-ACNC: 1.33 MU/L (ref 0.4–4)
VIT B12 SERPL-MCNC: 644 PG/ML (ref 193–986)
WBC # BLD AUTO: 5.3 10E3/UL (ref 4–11)

## 2021-08-03 PROCEDURE — 80048 BASIC METABOLIC PNL TOTAL CA: CPT

## 2021-08-03 PROCEDURE — G0103 PSA SCREENING: HCPCS

## 2021-08-03 PROCEDURE — 84443 ASSAY THYROID STIM HORMONE: CPT

## 2021-08-03 PROCEDURE — 85025 COMPLETE CBC W/AUTO DIFF WBC: CPT

## 2021-08-03 PROCEDURE — 80061 LIPID PANEL: CPT

## 2021-08-03 PROCEDURE — 82607 VITAMIN B-12: CPT

## 2021-08-03 PROCEDURE — 36415 COLL VENOUS BLD VENIPUNCTURE: CPT

## 2021-08-03 PROCEDURE — 86803 HEPATITIS C AB TEST: CPT

## 2021-08-03 RX ORDER — ATORVASTATIN CALCIUM 40 MG/1
40 TABLET, FILM COATED ORAL DAILY
Qty: 90 TABLET | Refills: 3 | Status: SHIPPED | OUTPATIENT
Start: 2021-08-03 | End: 2022-02-03

## 2021-08-03 NOTE — RESULT ENCOUNTER NOTE
Please call patient:    Your blood counts, thyroid, vitamin B12 and hepatitis C tests are normal.     Your prostate cancer test is higher. Follow-up with your urologist (I have made a referral).     Based on your cholesterol level and risk factors, I do recommend a daily cholesterol medication called atorvastatin 40 mg daily. Possible side effects include muscle aches and liver problems. Return for lab only recheck in 6 to 8 weeks while fasting (nothing but water and medications for at least 8 hours.)  You may call [phone] or go to [email or web address].    Marybeth Gaffney MD

## 2021-08-04 ENCOUNTER — TELEPHONE (OUTPATIENT)
Dept: UROLOGY | Facility: CLINIC | Age: 71
End: 2021-08-04

## 2021-08-04 DIAGNOSIS — C61 PROSTATE CANCER (H): Primary | ICD-10-CM

## 2021-08-04 NOTE — TELEPHONE ENCOUNTER
[facility] Call Center    Phone Message    May a detailed message be left on voicemail: yes     Reason for Call: Other: Pt calling because he is scheduled on 9/21 for prostate cancer concerns and has a referral. He was wondering if he could possibly be seen sooner on any Tuesday, since that is his day off. Please give pt a call back to discuss if so.     Action Taken: Message routed to:  [facility] ([facility] uro    Travel Screening: Not Applicable

## 2021-08-11 ENCOUNTER — PRE VISIT (OUTPATIENT)
Dept: UROLOGY | Facility: CLINIC | Age: 71
End: 2021-08-11

## 2021-09-10 ENCOUNTER — PRE VISIT (OUTPATIENT)
Dept: UROLOGY | Facility: CLINIC | Age: 71
End: 2021-09-10

## 2021-09-10 NOTE — TELEPHONE ENCOUNTER
Reason for visit: Consult (referred by Dr. Gaffney)     Relevant information: Prostate cancer    Records/imaging/labs/orders: Last PSA done 8/3    Pt called: N/A    At Rooming: Standard

## 2021-09-21 ENCOUNTER — OFFICE VISIT (OUTPATIENT)
Dept: UROLOGY | Facility: CLINIC | Age: 71
End: 2021-09-21
Payer: COMMERCIAL

## 2021-09-21 VITALS
BODY MASS INDEX: 25.18 KG/M2 | HEIGHT: 69 IN | SYSTOLIC BLOOD PRESSURE: 145 MMHG | DIASTOLIC BLOOD PRESSURE: 82 MMHG | WEIGHT: 170 LBS | HEART RATE: 65 BPM

## 2021-09-21 DIAGNOSIS — N13.8 BPH WITH OBSTRUCTION/LOWER URINARY TRACT SYMPTOMS: ICD-10-CM

## 2021-09-21 DIAGNOSIS — N40.1 BPH WITH OBSTRUCTION/LOWER URINARY TRACT SYMPTOMS: ICD-10-CM

## 2021-09-21 DIAGNOSIS — C61 PROSTATE CANCER (H): Primary | ICD-10-CM

## 2021-09-21 PROCEDURE — 99214 OFFICE O/P EST MOD 30 MIN: CPT | Mod: GC | Performed by: UROLOGY

## 2021-09-21 RX ORDER — TAMSULOSIN HYDROCHLORIDE 0.4 MG/1
0.4 CAPSULE ORAL DAILY
Qty: 30 CAPSULE | Refills: 11 | Status: ON HOLD | OUTPATIENT
Start: 2021-09-21 | End: 2022-02-05

## 2021-09-21 ASSESSMENT — MIFFLIN-ST. JEOR: SCORE: 1521.49

## 2021-09-21 ASSESSMENT — PAIN SCALES - GENERAL: PAINLEVEL: NO PAIN (0)

## 2021-09-21 NOTE — NURSING NOTE
"Chief Complaint   Patient presents with     Consult     Prostate cancer       Blood pressure (!) 145/82, pulse 65, height 1.753 m (5' 9\"), weight 77.1 kg (170 lb). Body mass index is 25.1 kg/m .    Patient Active Problem List   Diagnosis     Hyperlipidemia LDL goal <70     Allergic rhinitis due to dust     Paroxysmal atrial fibrillation (H)     History of CVA (cerebrovascular accident)     Advanced directives, counseling/discussion     Prostate cancer (H)     History of colonic polyps     Hypertension goal BP (blood pressure) < 140/90     Atrioventricular canal (AVC), incomplete     Memory problem       Allergies   Allergen Reactions     Dust Mites      Mold        Current Outpatient Medications   Medication Sig Dispense Refill     Ascorbic Acid (VITAMIN C PO) Takes total of 3 tablets every 2 weeks (divided)       B Complex Vitamins (VITAMIN B COMPLEX PO) Takes total of 3 tablets every 2 weeks (divided)       ELIQUIS ANTICOAGULANT 5 MG tablet 5 mg 2 times daily       lisinopril (ZESTRIL) 5 MG tablet Take 5 mg by mouth 2 times daily        vitamin E (TOCOPHEROL) 100 UNIT capsule Takes total of 3 tablets every 2 weeks (divided)       Zinc Gluconate (ZINC) 100 MG TABS Takes total of 3 tablets every 2 weeks (divided)       Ascorbic Acid 500 MG CHEW Take 500 mg by mouth       ASPIRIN NOT PRESCRIBED (INTENTIONAL) Please choose reason not prescribed, below (Patient not taking: Reported on 9/21/2021) 0 each 0     atorvastatin (LIPITOR) 40 MG tablet Take 1 tablet (40 mg) by mouth daily (Patient not taking: Reported on 9/21/2021) 90 tablet 3       Social History     Tobacco Use     Smoking status: Never Smoker     Smokeless tobacco: Never Used   Substance Use Topics     Alcohol use: No     Alcohol/week: 0.0 standard drinks     Drug use: No       Judith Angulo, EMT  9/21/2021  3:42 PM  "

## 2021-09-21 NOTE — PATIENT INSTRUCTIONS
Please schedule a follow up appointment with Dr. Pink in 1 month (10/19 at 2:00) with a PSA lab and MRI prior.     It was a pleasure meeting with you today.  Thank you for allowing me and my team the privilege of caring for you today.  YOU are the reason we are here, and I truly hope we provided you with the excellent service you deserve.  Please let us know if there is anything else we can do for you so that we can be sure you are leaving completely satisfied with your care experience.

## 2021-09-21 NOTE — PROGRESS NOTES
Chief Complaint:    Prostate Cancer         History of Present Illness:   Andre Alas is a very pleasant 71 year old male who presents with a history of CVA 2014, NSTEMI, Elevated PSA (Prostate Specific Antigen) and BPH w/ h/o HOLEP in 2018 (tx of median lobe only given ball-valve visualized and aim was to treat retention rather than a more radical approach in case he needed RALP in the future), h/o gl 3+3 PCA diagnosed 2018 via 12-core bx by Dr. Guadarrama (4 cores on right, 1 on left, tissue involved = 5%). PSA was 10 at the time of HOLEP, today it has risen to 14.6. Last MR performed in 2018 w/ PIRADS 3 in left PZ. Denies hematuria, dysuria, bone pains, weight loss, does note occasional night sweats but this has been regular.    When last seen by Dr. Oliveros in 2018 s/p HOLEP, his PVR was 419 and he was not catheterizing. Reports now he feels great from a urinary standpoint, has a weaker stream than he did 30 years ago but no longer has nocturia, isn't sure if he is emptying completely but overall is satisfied with his urination. Not on any medical therapy for urinary sx.    Struggles with erection maintenance, not bothered by this and not interested in treatment.         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     Hyperlipidemia LDL goal <70 11/28/2011     Hypertension goal BP (blood pressure) < 140/90      NSTEMI (non-ST elevated myocardial infarction) (H) 7/2014    prob sec to PAF -- no sig CAD     PAF (paroxysmal atrial fibrillation) (H) 7/2014     Prostate cancer (H) 02/2018          Past Surgical History:     Past Surgical History:   Procedure Laterality Date     ANGIOGRAM  2014    normal - non ST MI     COLONOSCOPY       DENTAL SURGERY  2000     HERNIA REPAIR,  INGUINAL RT/LT  1952     LASER HOLMIUM ENUCLEATION PROSTATE N/A 9/27/2018    Procedure: LASER HOLMIUM ENUCLEATION PROSTATE;  Holmium Laser Enucleation Of The Prostate;  Surgeon: Henry Oliveros MD;  Location: [facility]          Medications     Current Outpatient Medications   Medication     Ascorbic Acid (VITAMIN C PO)     B Complex Vitamins (VITAMIN B COMPLEX PO)     ELIQUIS ANTICOAGULANT 5 MG tablet     lisinopril (ZESTRIL) 5 MG tablet     vitamin E (TOCOPHEROL) 100 UNIT capsule     Zinc Gluconate (ZINC) 100 MG TABS     Ascorbic Acid 500 MG CHEW     ASPIRIN NOT PRESCRIBED (INTENTIONAL)     atorvastatin (LIPITOR) 40 MG tablet     No current facility-administered medications for this visit.          Family History:     Family History   Problem Relation Age of Onset     Heart Failure Mother 73     Cerebrovascular Disease Father 70     Heart Disease Father 91        Pacemaker     Kidney Disease Father         stones      No Known Problems Brother      No Known Problems Sister      Cancer - colorectal No family hx of      Prostate Cancer No family hx of      Diabetes No family hx of      Hypertension No family hx of           Social History:     Social History     Socioeconomic History     Marital status:      Spouse name: Ludmila     Number of children: 2     Years of education: Not on file     Highest education level: Not on file   Occupational History     Occupation:      Employer: [employer]   Tobacco Use     Smoking status: Never Smoker     Smokeless tobacco: Never Used   Substance and Sexual Activity     Alcohol use: No     Alcohol/week: 0.0 standard drinks     Drug use: No     Sexual activity: Yes     Partners: Female     Birth control/protection: Condom   Other Topics Concern     Parent/sibling w/ CABG, MI or angioplasty before 65F 55M? No   Social History Narrative     Not on file     Social Determinants of Health     Financial Resource Strain:      Difficulty of Paying  Living Expenses:    Food Insecurity:      Worried About Running Out of Food in the Last Year:      Ran Out of Food in the Last Year:    Transportation Needs:      Lack of Transportation (Medical):      Lack of Transportation (Non-Medical):    Physical Activity:      Days of Exercise per Week:      Minutes of Exercise per Session:    Stress:      Feeling of Stress :    Social Connections:      Frequency of Communication with Friends and Family:      Frequency of Social Gatherings with Friends and Family:      Attends Muslim Services:      Active Member of Clubs or Organizations:      Attends Club or Organization Meetings:      Marital Status:    Intimate Partner Violence:      Fear of Current or Ex-Partner:      Emotionally Abused:      Physically Abused:      Sexually Abused:           Allergies:   Dust mites and Mold         Review of Systems:  From intake questionnaire     Skin: negative  Eyes: negative  Ears/Nose/Throat: negative  Respiratory: No shortness of breath, dyspnea on exertion, cough, or hemoptysis  Cardiovascular: No chest pain or palpitations  Gastrointestinal: negative; no nausea/vomiting, constipation or diarrhea  Genitourinary: as per HPI  Musculoskeletal: negative  Neurologic: negative  Psychiatric: negative  Hematologic/Lymphatic/Immunologic: negative  Endocrine: negative         Physical Exam:     Patient is a 71 year old  male   Vitals: There were no vitals taken for this visit.  Constitutional: There is no height or weight on file to calculate BMI.  Alert, no acute distress, oriented, conversant  Eyes: no scleral icterus; extraocular muscles intact, moist conjunctivae  Respiratory: no respiratory distress, or pursed lip breathing  Cardiovascular: pulses strong and intact; no obvious jugular venous distension present  Gastrointestinal: soft, nontender, no organomegaly or masses,   Musculoskeletal: extremities normal, no peripheral edema  Skin: no suspicious lesions or rashes  Neuro: Alert,  oriented, speech and mentation normal  Psych: affect and mood normal, alert and oriented to person, place and time  Gait: Normal  : deferred    PVR >750 ml      Labs and Pathology:    I reviewed all applicable laboratory and pathology data and went over findings with patient  Significant for   Lab Results   Component Value Date    CR 1.22 08/03/2021    CR 1.03 10/23/2018    CR 1.22 09/28/2018    CR 0.98 09/07/2018    CR 0.95 07/12/2014    CR 1.06 11/29/2011     PSA   Date Value Ref Range Status   12/18/2018 9.96 (H) 0 - 4 ug/L Final     Comment:     Assay Method:  Chemiluminescence using Siemens Vista analyzer   09/07/2018 13.80 (H) 0 - 4 ug/L Final     Comment:     Assay Method:  Chemiluminescence using Siemens Vista analyzer   06/13/2018 11.40 (H) 0 - 4 ug/L Final     Comment:     Assay Method:  Chemiluminescence using Siemens Vista analyzer   01/12/2018 11.90 (H) 0 - 4 ug/L Final     Comment:     Assay Method:  Chemiluminescence using Siemens Vista analyzer   12/05/2017 10.70 (H) 0 - 4 ug/L Final     Comment:     Assay Method:  Chemiluminescence using Siemens Vista analyzer     Prostate Specific Antigen Screen   Date Value Ref Range Status   08/03/2021 14.60 (H) 0.00 - 4.00 ug/L Final       Imaging:    The following imaging exams were independently viewed and interpreted by me and discussed with patient:    MRI Prostate 3/31/2021  Prostate gland size: 4.5 x 5.8 x 6.7 cm  Volume: 90.0 cc  IMPRESSION:   1. Based on the most suspicious abnormality, this exam is  characterized as PIRADS 3 - Intermediate probability.  The most  suspicious abnormality is located in the posterior left peripheral  zone at the level of the base. It is possible possible that this could  represent a central zone asymmetry.  2. No suspicious adenopathy or evidence of pelvic metastases.  3. Diffuse T2 hypointense stranding throughout the peripheral zone  with areas of precontrast T1 hyperintensity consistent with  prostatitis and hemorrhage  following recent prostate biopsy.      Outside and Past Medical records:    Review of the result(s) of each unique test - MRI, pathology, PSA         Assessment and Plan:     Assessment:  Andre Alas is a 72 y/o M w/ h/o gl 3+3 PCA diagnosed in 2018 with minimal follow-up since. Also with BPH (90g prostate in 2018 s/p HOLEP w/ removal of 13g median lobe only), now w/ rising PSA to 14.6 presenting to re-establish care. We discussed his prostate cancer history and role for PSA and MRI follow-up on active surveillance. We also discussed the role for surveillance biopsy. At this point, he does not want to commit to another biopsy, but would like to recheck his PSA and MRI and will review results with me next month.  Regarding his urinary symptoms, he reports feeling rather well, however his PVR was > 750 ml today. We reviewed that this poses risks to his renal function, long-term bladder function, as well as risk of UTIs. Given he does not feel symptomatic, he declines CIC or catheter. However, we discussed the role of tamsulosin and how this may help. Risks and potential side effects were discussed, and he agrees to give this a try.    Plan:  - Start tamsulosin 0.4 mg daily  - MRI prostate  - PSA recheck  - Follow-up in 1 month with PVR, and to review MRI and PSA    Surinder Wilks MD  Urology Resident    Orders  Orders Placed This Encounter   Procedures     MR Prostate wo & w Contrast     PSA tumor marker     Attestation:  This patient was seen and evaluated by me, with the resident taking notes and acting as scribe.  I have reviewed and edited the note above to reflect my findings.  The physical exam and or any procedures were performed by me and the pertinant details are outlined above.    35 total minutes spent on the date of the encounter including direct interaction with the patient, performing chart review, documentation and further activities as noted above.    Akil Pink MD  Urology  [facility]  [facility]

## 2021-09-22 PROBLEM — N13.8 BPH WITH OBSTRUCTION/LOWER URINARY TRACT SYMPTOMS: Status: ACTIVE | Noted: 2018-09-27

## 2021-09-22 PROBLEM — N40.1 BPH WITH OBSTRUCTION/LOWER URINARY TRACT SYMPTOMS: Status: ACTIVE | Noted: 2018-09-27

## 2021-09-23 ENCOUNTER — PRE VISIT (OUTPATIENT)
Dept: UROLOGY | Facility: CLINIC | Age: 71
End: 2021-09-23

## 2021-09-23 NOTE — TELEPHONE ENCOUNTER
Reason for visit: Follow up w/ MRI and PSA     Relevant information: BPH; elevated PSA    Records/imaging/labs/orders: PSA and MRI on 10/12    Pt called: N/A    At Rooming: Standard

## 2021-09-30 ENCOUNTER — TELEPHONE (OUTPATIENT)
Dept: UROLOGY | Facility: CLINIC | Age: 71
End: 2021-09-30

## 2021-10-12 ENCOUNTER — ANCILLARY PROCEDURE (OUTPATIENT)
Dept: MRI IMAGING | Facility: CLINIC | Age: 71
End: 2021-10-12
Attending: UROLOGY
Payer: COMMERCIAL

## 2021-10-12 ENCOUNTER — LAB (OUTPATIENT)
Dept: LAB | Facility: CLINIC | Age: 71
End: 2021-10-12

## 2021-10-12 DIAGNOSIS — C61 PROSTATE CANCER (H): ICD-10-CM

## 2021-10-12 LAB — PSA SERPL-MCNC: 15.1 UG/L (ref 0–4)

## 2021-10-12 PROCEDURE — 76377 3D RENDER W/INTRP POSTPROCES: CPT | Performed by: RADIOLOGY

## 2021-10-12 PROCEDURE — A9585 GADOBUTROL INJECTION: HCPCS | Performed by: RADIOLOGY

## 2021-10-12 PROCEDURE — 72197 MRI PELVIS W/O & W/DYE: CPT | Performed by: RADIOLOGY

## 2021-10-12 PROCEDURE — 36415 COLL VENOUS BLD VENIPUNCTURE: CPT | Performed by: PATHOLOGY

## 2021-10-12 PROCEDURE — 84153 ASSAY OF PSA TOTAL: CPT | Performed by: PATHOLOGY

## 2021-10-12 RX ORDER — GADOBUTROL 604.72 MG/ML
10 INJECTION INTRAVENOUS ONCE
Status: DISCONTINUED | OUTPATIENT
Start: 2021-10-12 | End: 2021-10-12

## 2021-10-12 RX ORDER — GADOBUTROL 604.72 MG/ML
7.5 INJECTION INTRAVENOUS ONCE
Status: COMPLETED | OUTPATIENT
Start: 2021-10-12 | End: 2021-10-12

## 2021-10-12 RX ADMIN — GADOBUTROL 7.5 ML: 604.72 INJECTION INTRAVENOUS at 18:38

## 2021-10-19 ENCOUNTER — OFFICE VISIT (OUTPATIENT)
Dept: UROLOGY | Facility: CLINIC | Age: 71
End: 2021-10-19
Payer: COMMERCIAL

## 2021-10-19 VITALS
BODY MASS INDEX: 25.48 KG/M2 | WEIGHT: 172 LBS | SYSTOLIC BLOOD PRESSURE: 142 MMHG | HEART RATE: 65 BPM | DIASTOLIC BLOOD PRESSURE: 86 MMHG | HEIGHT: 69 IN

## 2021-10-19 DIAGNOSIS — N13.8 BPH WITH OBSTRUCTION/LOWER URINARY TRACT SYMPTOMS: ICD-10-CM

## 2021-10-19 DIAGNOSIS — C61 PROSTATE CANCER (H): Primary | ICD-10-CM

## 2021-10-19 DIAGNOSIS — N40.1 BPH WITH OBSTRUCTION/LOWER URINARY TRACT SYMPTOMS: ICD-10-CM

## 2021-10-19 PROCEDURE — 99214 OFFICE O/P EST MOD 30 MIN: CPT | Performed by: UROLOGY

## 2021-10-19 ASSESSMENT — PAIN SCALES - GENERAL: PAINLEVEL: NO PAIN (0)

## 2021-10-19 ASSESSMENT — MIFFLIN-ST. JEOR: SCORE: 1530.57

## 2021-10-19 NOTE — PATIENT INSTRUCTIONS
Ultrasound Guided Prostate Biopsy    What is a prostate biopsy? A prostate biopsy is a relatively painless procedure performed in the physician's office, outpatient facility or hospital.  A long, thin needle is inserted into the prostate to collect a sample of tissue from the prostate.  These samples are then examined by a pathologist for abnormal cells.    Why do I need a prostate biopsy? During a man's lifetime the prostate gradually increases in size.  The patient may or may not experience symptoms.  Symptoms of an enlarged prostate include:    Increased frequency of urination    Decreased force of urinary stream    Trouble with urination    Awakening at night to urinate    When the prostate is examined, the physician may feel a nodule (hard or firm growth) which would require a biopsy.    Another reason for having a prostate biopsy is an increase in the prostate specific androgen (PSA) in your blood.  A prostate biopsy may be necessary to determine the cause of the increased PSA.    Your physician will also perform an ultrasound (an image created by sound waves).  The ultrasound is performed by placing a small probe in the rectum to image the prostate gland.    Preparation for the Prostate Biopsy    1. Blood thinning medications must be stopped prior to your biopsy.  Please stop the following medication the appropriate number of days before:  a. 10 days-acetylsalicylic acid, vitamin E, fish oil, aspirin, baby aspirin  b. 7 days- Plavix, Brilinta, ibuprofen (Advil, Motrin, Aleve)  c. 5 days-Pradaxa  d. 4 days-Coumadin/warfarin  e. 3 days-Eliquis, Xarelto    2.  If you have any bleeding problems (thin blood), please tell your doctor.    3.  If you have been told by another doctor to take antibiotics before dental work or surgery, please tell the doctor.  This is common for patients that have had a joint replacement.    YOU HAVE BEEN PRESCRIBED AN ANTIBIOTIC.  You will start this medication the day before your  biopsy.  Take one pill each morning until they are gone.      The day of the biopsy you will be asked to use an enema one hour before you leave for your appointment.    PLEASE EAT YOUR REGULAR MEALS ON THE DAY OF YOUR BIOPSY.    Unless you have been prescribed a sedative (Valium or a similar drug) you will be able to drive to and from your appointment.  If you have taken a sedative you must have a ride.    AFTER THE BIOPSY    DANGER SIGNS    Small amount of blood in the urine for 10-14 days Excessive blood in the urine, stool or ejaculate  Small amount of blood in the stool for 48 hours Fever over 100 or chills  Small amount of blood in the ejaculate for up to Frequent urination or burning when you urinate   4 weeks          CALL THE DOCTOR'S OFFICE -[phone] IF YOU HAVE ANY OF THESE SYMPTOMS.  IF YOU CANNOT CONTACT THE OFFICE, GO TO THE EMERGENCY ROOM.          Your biopsy is scheduled on____11/11/21_______ at our [place] office.  Please be at the office at     ___10:00AM_____.      A follow up visit has been scheduled for you on ____11/18/21_______@____11:30AM_____ . This     is a telephone visit.  Your doctor will discuss your results with you at this visit.

## 2021-10-19 NOTE — PROGRESS NOTES
"  CHIEF COMPLAINT   It was my pleasure to see Andre Alas who is a 71 year old male for follow-up of elevated PSA.      HPI  Andre Alas is a very pleasant 71 year old male who presents with a history of CVA 2014, NSTEMI, Elevated PSA (Prostate Specific Antigen) and BPH w/ h/o HOLEP in 2018 (tx of median lobe only given ball-valve visualized and aim was to treat retention rather than a more radical approach in case he needed RALP in the future), h/o gl 3+3 PCA diagnosed 2018 via 12-core bx by Dr. Guadarrama (4 cores on right, 1 on left, tissue involved = 5%). PSA was 10 at the time of HOLEP, and subsequently otilia earlier this year to 14.6.      When last seen by Dr. Oliveros in 2018 s/p HOLEP, his PVR was 419 and he was not catheterizing. Had even higher PVR at last visit with me, and started on tamsulosin, which he reports has improved.     Struggles with erection maintenance, not bothered by this and not interested in treatment.    PSA  10/12/2021 - 15.10  8/3/2021 - 14.60  12/18/2018 - 9.96    MRI Prostate 10/12/2021  IMPRESSION:  1. Based on the most suspicious abnormality, this exam is  characterized as PIRADS 4 - Clinically significant cancer is likely to  be present.  The most suspicious abnormality is located at the Right  apex peripheral zone at the 7 o'clock and there is minimal capsular  abutment with no convincing evidence of extraprostatic extension. The  diffusion restriction in this region is new from comparison exam.  2. PIRADS 3 area within the left apex peripheral zone at the 5 o'clock  position.  3. No suspicious adenopathy or evidence of pelvic metastases.     PHYSICAL EXAM  Patient is a 71 year old  male   Vitals: Blood pressure (!) 142/86, pulse 65, height 1.753 m (5' 9\"), weight 78 kg (172 lb).  General Appearance Adult: Body mass index is 25.4 kg/m .  Alert, no acute distress, oriented  Lungs: no respiratory distress, or pursed lip breathing  Abdomen: soft, nontender, no organomegaly or " masses  Back: no CVAT  Neuro: Alert, oriented, speech and mentation normal  Psych: affect and mood normal    Outside and Past Medical records:  Review of the result(s) of each unique test - PSA, MRI, pathology    ASSESSMENT and PLAN  Andre Alas is a 70 y/o M w/ h/o gl 3+3 PCA diagnosed in 2018 with minimal follow-up since. Also with BPH (90g prostate in 2018 s/p HOLEP w/ removal of 13g median lobe only), now w/ rising PSA to 14.6 and 15.1 on recheck. MRI now demonstrates PIRADS 4 lesion. We ha a long discussion about this today and the role for re-biopsy. We discussed the MRI findings and the risks/benefits of a fusion biopsy to target the area of abnormality. Risk of infection was discussed. We also discussed the importance of assessing this, but will also need to monitor his bladder emptying as he has had poor emptying in the past.  He ultimately elects to proceed with prostate biopsy.     Plan:  - continue tamsulosin 0.4 mg daily  - Schedule for fusion biopsy    23 minutes spent on the date of the encounter doing chart review, history and exam, documentation and further activities as noted above.    Akil Pink MD  Urology  [facility]

## 2021-10-19 NOTE — NURSING NOTE
"Chief Complaint   Patient presents with     Follow Up     go over MRI results       Blood pressure (!) 142/86, pulse 65, height 1.753 m (5' 9\"), weight 78 kg (172 lb). Body mass index is 25.4 kg/m .    Patient Active Problem List   Diagnosis     Hyperlipidemia LDL goal <70     Allergic rhinitis due to dust     Paroxysmal atrial fibrillation (H)     History of CVA (cerebrovascular accident)     Advanced directives, counseling/discussion     Prostate cancer (H)     History of colonic polyps     Hypertension goal BP (blood pressure) < 140/90     BPH with obstruction/lower urinary tract symptoms     Atrioventricular canal (AVC), incomplete     Memory problem       Allergies   Allergen Reactions     Dust Mites      Mold        Current Outpatient Medications   Medication Sig Dispense Refill     Ascorbic Acid (VITAMIN C PO) Takes total of 3 tablets every 2 weeks (divided)       Ascorbic Acid 500 MG CHEW Take 500 mg by mouth       B Complex Vitamins (VITAMIN B COMPLEX PO) Takes total of 3 tablets every 2 weeks (divided)       ELIQUIS ANTICOAGULANT 5 MG tablet 5 mg 2 times daily       lisinopril (ZESTRIL) 5 MG tablet Take 5 mg by mouth 2 times daily        tamsulosin (FLOMAX) 0.4 MG capsule Take 1 capsule (0.4 mg) by mouth daily 30 capsule 11     vitamin E (TOCOPHEROL) 100 UNIT capsule Takes total of 3 tablets every 2 weeks (divided)       Zinc Gluconate (ZINC) 100 MG TABS Takes total of 3 tablets every 2 weeks (divided)       ASPIRIN NOT PRESCRIBED (INTENTIONAL) Please choose reason not prescribed, below (Patient not taking: Reported on 9/21/2021) 0 each 0     atorvastatin (LIPITOR) 40 MG tablet Take 1 tablet (40 mg) by mouth daily (Patient not taking: Reported on 9/21/2021) 90 tablet 3       Social History     Tobacco Use     Smoking status: Never Smoker     Smokeless tobacco: Never Used   Substance Use Topics     Alcohol use: No     Alcohol/week: 0.0 standard drinks     Drug use: No       Joelle " Bethany  10/19/2021  1:42 PM

## 2021-10-25 ENCOUNTER — TELEPHONE (OUTPATIENT)
Dept: UROLOGY | Facility: CLINIC | Age: 71
End: 2021-10-25

## 2021-10-25 NOTE — TELEPHONE ENCOUNTER
[facility] Call Center    Phone Message    May a detailed message be left on voicemail: yes     Reason for Call: Other: Kelley called in wanting to get pt's prostated biopsy with Joesph done sooner. There was nothing before 1/4/21 at the [facility], she is wondering if Dr. Pink has sooner availability at [place]. Please call her back to discuss     Action Taken: Message routed to:  Other: u[facility] uro    Travel Screening: Not Applicable

## 2021-10-26 NOTE — TELEPHONE ENCOUNTER
1026 left detailed message informing her we would call if her we had any openings but as of now we do not

## 2021-11-02 DIAGNOSIS — C61 PROSTATE CANCER (H): Primary | ICD-10-CM

## 2021-11-02 RX ORDER — LEVOFLOXACIN 500 MG/1
500 TABLET, FILM COATED ORAL DAILY
Qty: 3 TABLET | Refills: 0 | Status: SHIPPED | OUTPATIENT
Start: 2021-11-02 | End: 2022-02-03

## 2021-11-11 ENCOUNTER — OFFICE VISIT (OUTPATIENT)
Dept: UROLOGY | Facility: CLINIC | Age: 71
End: 2021-11-11
Payer: COMMERCIAL

## 2021-11-11 ENCOUNTER — ALLIED HEALTH/NURSE VISIT (OUTPATIENT)
Dept: UROLOGY | Facility: CLINIC | Age: 71
End: 2021-11-11
Payer: COMMERCIAL

## 2021-11-11 VITALS
HEART RATE: 67 BPM | WEIGHT: 172 LBS | BODY MASS INDEX: 25.48 KG/M2 | SYSTOLIC BLOOD PRESSURE: 110 MMHG | HEIGHT: 69 IN | DIASTOLIC BLOOD PRESSURE: 70 MMHG | OXYGEN SATURATION: 97 %

## 2021-11-11 DIAGNOSIS — Z79.2 PROPHYLACTIC ANTIBIOTIC: Primary | ICD-10-CM

## 2021-11-11 DIAGNOSIS — C61 PROSTATE CANCER (H): Primary | ICD-10-CM

## 2021-11-11 PROCEDURE — 55700 PR BIOPSY OF PROSTATE,NEEDLE/PUNCH: CPT | Performed by: UROLOGY

## 2021-11-11 PROCEDURE — 96372 THER/PROPH/DIAG INJ SC/IM: CPT | Mod: 59 | Performed by: UROLOGY

## 2021-11-11 PROCEDURE — 88305 TISSUE EXAM BY PATHOLOGIST: CPT | Performed by: PATHOLOGY

## 2021-11-11 PROCEDURE — 76942 ECHO GUIDE FOR BIOPSY: CPT | Performed by: UROLOGY

## 2021-11-11 RX ORDER — GENTAMICIN 40 MG/ML
80 INJECTION, SOLUTION INTRAMUSCULAR; INTRAVENOUS ONCE
Status: COMPLETED | OUTPATIENT
Start: 2021-11-11 | End: 2021-11-11

## 2021-11-11 RX ORDER — LIDOCAINE HYDROCHLORIDE 10 MG/ML
20 INJECTION, SOLUTION EPIDURAL; INFILTRATION; INTRACAUDAL; PERINEURAL ONCE
Status: COMPLETED | OUTPATIENT
Start: 2021-11-11 | End: 2021-11-11

## 2021-11-11 RX ADMIN — GENTAMICIN 80 MG: 40 INJECTION, SOLUTION INTRAMUSCULAR; INTRAVENOUS at 10:00

## 2021-11-11 RX ADMIN — LIDOCAINE HYDROCHLORIDE 15 ML: 10 INJECTION, SOLUTION EPIDURAL; INFILTRATION; INTRACAUDAL; PERINEURAL at 11:00

## 2021-11-11 ASSESSMENT — PAIN SCALES - GENERAL: PAINLEVEL: NO PAIN (0)

## 2021-11-11 ASSESSMENT — MIFFLIN-ST. JEOR: SCORE: 1530.57

## 2021-11-11 NOTE — PATIENT INSTRUCTIONS
[facility]  Transrectal Ultrasound  Post Operative Information    The physician who performed your Transrectal Ultrasound is Dr. Pink (telephone number [phone]).  Please contact this doctor if you have any problems or questions.  If unable to reach your doctor, please return to the Emergency Department.      Take one antibiotic the evening of the procedure and then as directed on your prescription.    Drink at least 6-8 glasses of fluids for the first 48 hours.    Avoid heavy lifting and strenuous activity for 48 hours.    Avoid sexual intercourse for the first 24 hours.    No aspirin or ibuprofen products (Motrin, Advil, Nuprin, ect.) for one week.  You may take acetaminophen (Tylenol) for pain.    You may notice a small amount of blood on the tissue after a bowel movement.    You may pass blood with clots in your urine following the procedure.  The amount will decrease with time but may be visible for up to two weeks.     You make have blood in your semen for 4 weeks after the procedure.    You may experience mild perineal (groin area) discomfort after the procedure.    Please call you doctor if you have any of the follow symptoms:  Fever  Increase in the amount of blood passed  Severe discomfort or pain

## 2021-11-11 NOTE — PROGRESS NOTES
The following medication was given:     MEDICATION: Gentamicin   ROUTE: IM  SITE: LUQ - Gluteus  DOSE:80mg/2ml  LOT #: [id number]  : [employer]iiNOW  EXPIRATION DATE: 02/23  NDC#: 27687-774-48   Was there drug waste? No  Multi-dose vial: Yes      Mirna Wilks LPN

## 2021-11-11 NOTE — LETTER
11/11/2021       RE: Andre Alas  [address]  [place] [facility] [address]     Dear Colleague,    Thank you for referring your patient, Andre Alas, to the [facility] [place] at [facility]. Please see a copy of my visit note below.    PREPROCEDURE DIAGNOSIS: Prostate Cancer  POSTPROCEDURE DIAGNOSIS: Prostate Cancer  PROCEDURE: Transrectal ultrasound sizing and transrectal ultrasound guided prostatic needle biopsy, including MRI fusion targeting  for Andre Alas who is a 71 year old male. PSA 15.10.  SURGEON: Akil Pink  ANESTHESIA: 5 mL of 1% periprostatic block bilaterally   We previously obtained an MRI of the prostate and identified all PIRADS 3-5 lesions for targeting    Target Lesion #1 PIRADS 4 - right apex peripheral zone  Target Lesion #2 PIRADS 3 - left apex peripheral zone    DESCRIPTION OF PROCEDURE: The procedure, the outcome, the anesthesia, and the risks were discussed with the patient. Informed consent was obtained and signed and a timeout was completed prior to the procedure. Digital rectal examination was performed with the below findings noted. Anesthesia was administered as noted above and the transrectal ultrasound probe was inserted, sizing was performed, and the below findings were noted. 2 core biopsies were taken from each of the MRI targets, and 12 core biopsies were taken as described below. The probe was then withdrawn. Patient tolerated the procedure well.   FINDINGS: Digital rectal exam reveals normal prostate. Total volume is 87 mL. Hypoechoic lesion bilaterally. US images were obtained and then fused with the MRI images.  The fused images were then used to guide the biopsy of the targeted lesions with 2 cores taken of each lesion.  We then performed random biopsies.  12 cores were taken with 6 on each side, base, mid and apex.  PLAN: Will follow-up next week to review results.  Akil Pink,  MD  Urology  [facility]

## 2021-11-11 NOTE — NURSING NOTE
"Chief Complaint   Patient presents with     Elevated PSA     Patient is here for Transrectal Ultrasound/MRI Guided Prostate Biopsies        Blood pressure 110/70, pulse 67, height 1.753 m (5' 9\"), weight 78 kg (172 lb), SpO2 97 %. Body mass index is 25.4 kg/m .    Patient Active Problem List   Diagnosis     Hyperlipidemia LDL goal <70     Allergic rhinitis due to dust     Paroxysmal atrial fibrillation (H)     History of CVA (cerebrovascular accident)     Advanced directives, counseling/discussion     Prostate cancer (H)     History of colonic polyps     Hypertension goal BP (blood pressure) < 140/90     BPH with obstruction/lower urinary tract symptoms     Atrioventricular canal (AVC), incomplete     Memory problem       Allergies   Allergen Reactions     Dust Mites      Mold        Current Outpatient Medications   Medication Sig Dispense Refill     Ascorbic Acid (VITAMIN C PO) Takes total of 3 tablets every 2 weeks (divided)       Ascorbic Acid 500 MG CHEW Take 500 mg by mouth       ASPIRIN NOT PRESCRIBED (INTENTIONAL) Please choose reason not prescribed, below 0 each 0     atorvastatin (LIPITOR) 40 MG tablet Take 1 tablet (40 mg) by mouth daily 90 tablet 3     B Complex Vitamins (VITAMIN B COMPLEX PO) Takes total of 3 tablets every 2 weeks (divided)       ELIQUIS ANTICOAGULANT 5 MG tablet 5 mg 2 times daily       levofloxacin (LEVAQUIN) 500 MG tablet Take 1 tablet (500 mg) by mouth daily START DAY BEFORE YOUR PROCEDURE 3 tablet 0     lisinopril (ZESTRIL) 5 MG tablet Take 5 mg by mouth 2 times daily        tamsulosin (FLOMAX) 0.4 MG capsule Take 1 capsule (0.4 mg) by mouth daily 30 capsule 11     vitamin E (TOCOPHEROL) 100 UNIT capsule Takes total of 3 tablets every 2 weeks (divided)       Zinc Gluconate (ZINC) 100 MG TABS Takes total of 3 tablets every 2 weeks (divided)         Social History     Tobacco Use     Smoking status: Never Smoker     Smokeless tobacco: Never Used   Substance Use Topics     Alcohol use: " No     Alcohol/week: 0.0 standard drinks     Drug use: No         Procedure was explained to patient prior to performing said procedure. The patient signed the consent form and all questions were answered prior to the procedure. Any pre-procedural antibiotics were given according to the performing physicians recommendation. Pt's information was confirmed on samples and samples were sent for analysis. Paient reviewed information on labels sent with patient and confirmed the accuracy of all the labels.    Consent read and signed: Yes     Allergies   Allergen Reactions     Dust Mites      Mold      Performing Physician: Dr. Pink  Antibiotic taken?  YES  Aspirin or other blood thinning medications discontinued 7-10 days:  Time of Fleet's enema:  YES  Patient given Gentamicin intramuscular injection 30 minutes prior to procedure Yes Given by Mirna Wilks    12 samples were taken from the right and left, medial and  base, mid, and apex of the prostate respectively. Yes additional samples were taken from . Vitals were repeated prior to patient leaving and instructions for post Transrectal Ultrasound/MRI Guided Prostate Biopsies  care were explained to the patient.       The following medication was given:     MEDICATION:  Lidocaine 1% Soln  ROUTE: RECTAL  SITE: PROSTATE  DOSE: 15ML  LOT #: [id number]  : [employer]  EXPIRATION DATE: 08/01/2023  NDC#: 8523-6731-79  Was there drug waste? Yes  Amount of drug waste (mL): 5ML.  Reason for waste:  Single use vial  Multi-dose vial: OVIDIO Torres  11/11/2021  1:02 PM

## 2021-11-11 NOTE — PROGRESS NOTES
PREPROCEDURE DIAGNOSIS: Prostate Cancer  POSTPROCEDURE DIAGNOSIS: Prostate Cancer  PROCEDURE: Transrectal ultrasound sizing and transrectal ultrasound guided prostatic needle biopsy, including MRI fusion targeting  for Andre Alas who is a 71 year old male. PSA 15.10.  SURGEON: Akil Pink  ANESTHESIA: 5 mL of 1% periprostatic block bilaterally   We previously obtained an MRI of the prostate and identified all PIRADS 3-5 lesions for targeting    Target Lesion #1 PIRADS 4 - right apex peripheral zone  Target Lesion #2 PIRADS 3 - left apex peripheral zone    DESCRIPTION OF PROCEDURE: The procedure, the outcome, the anesthesia, and the risks were discussed with the patient. Informed consent was obtained and signed and a timeout was completed prior to the procedure. Digital rectal examination was performed with the below findings noted. Anesthesia was administered as noted above and the transrectal ultrasound probe was inserted, sizing was performed, and the below findings were noted. 2 core biopsies were taken from each of the MRI targets, and 12 core biopsies were taken as described below. The probe was then withdrawn. Patient tolerated the procedure well.   FINDINGS: Digital rectal exam reveals normal prostate. Total volume is 87 mL. Hypoechoic lesion bilaterally. US images were obtained and then fused with the MRI images.  The fused images were then used to guide the biopsy of the targeted lesions with 2 cores taken of each lesion.  We then performed random biopsies.  12 cores were taken with 6 on each side, base, mid and apex.  PLAN: Will follow-up next week to review results.  Akil Pink MD  Urology  [facility]

## 2021-11-11 NOTE — NURSING NOTE
Chief Complaint   Patient presents with    Elevated PSA     Patient is here for Transrectal Ultrasound/MRI Guided Prostate Biopsies      Procedure was explained to patient prior to performing said procedure.  The patient signed the [place] consent form and all questions were answered prior to the procedure.  Any pre-procedural antibiotics were given according to the performing physician's recommendation.  Patient reviewed information on the labels attached to samples and confirmed the accuracy of all of the labels.     Performing Physician:  Dr. Pink  Antibiotic taken?  yes  Aspirin or other blood thinning medications discontinued 7-10 days: yes  Time of enema: 8:57am  Patient given Gentamicin intramuscular injection 30 minutes prior to procedure  Yes    Twelve samples were taken from the right and left, medial and lateral base, mid and apex of the prostate respectively.  two additional samples were taken from Target Lesion 1 and 2.  Vitals were repeated prior to patient leaving and instructions for post TRUS care were explained to the patient.     Mirna Wilks LPN

## 2021-11-15 LAB
PATH REPORT.COMMENTS IMP SPEC: ABNORMAL
PATH REPORT.COMMENTS IMP SPEC: ABNORMAL
PATH REPORT.COMMENTS IMP SPEC: YES
PATH REPORT.FINAL DX SPEC: ABNORMAL
PATH REPORT.GROSS SPEC: ABNORMAL
PATH REPORT.MICROSCOPIC SPEC OTHER STN: ABNORMAL
PATH REPORT.RELEVANT HX SPEC: ABNORMAL
PHOTO IMAGE: ABNORMAL

## 2021-11-18 ENCOUNTER — VIRTUAL VISIT (OUTPATIENT)
Dept: UROLOGY | Facility: CLINIC | Age: 71
End: 2021-11-18
Payer: COMMERCIAL

## 2021-11-18 VITALS — BODY MASS INDEX: 31.65 KG/M2 | WEIGHT: 172 LBS | HEIGHT: 62 IN

## 2021-11-18 DIAGNOSIS — C61 PROSTATE CANCER (H): Primary | ICD-10-CM

## 2021-11-18 PROCEDURE — 99214 OFFICE O/P EST MOD 30 MIN: CPT | Mod: TEL | Performed by: UROLOGY

## 2021-11-18 RX ORDER — HEPARIN SODIUM 10000 [USP'U]/ML
5000 INJECTION, SOLUTION INTRAVENOUS; SUBCUTANEOUS
Status: CANCELLED | OUTPATIENT
Start: 2021-11-18

## 2021-11-18 ASSESSMENT — PAIN SCALES - GENERAL: PAINLEVEL: NO PAIN (0)

## 2021-11-18 ASSESSMENT — MIFFLIN-ST. JEOR: SCORE: 1419.44

## 2021-11-18 NOTE — PROGRESS NOTES
Andre Alas is a 71 year old male who is being evaluated via a billable telephone visit.      What phone number would you like to be contacted at? [phone]  How would you like to obtain your AVS? Mail a copy    Chief Complaint  It was my pleasure to speak with Andre Alas who is a 71 year old male for follow-up of Prostate Cancer.      HPI  Andre Alas is a very pleasant 71 year old male who presents with a history of CVA 2014, NSTEMI, Elevated PSA (Prostate Specific Antigen) and BPH w/ h/o HOLEP in 2018 (tx of median lobe only given ball-valve visualized and aim was to treat retention rather than a more radical approach in case he needed RALP in the future), h/o gl 3+3 PCA diagnosed 2018 via 12-core bx by Dr. Guadarrama (4 cores on right, 1 on left, tissue involved = 5%). PSA was 10 at the time of HOLEP, and subsequently otilia earlier this year to 14.6.      When last seen by Dr. Oliveros in 2018 s/p HOLEP, his PVR was 419 and he was not catheterizing. Had even higher PVR at last visit with me, and started on tamsulosin, which he reports has improved.     Struggles with erection maintenance, not bothered by this and not interested in treatment.    Now s/p TRUS with Aleksandar 3+4 prostate cancer. No complications since biopsy.     TRUS 11/11/2021  Final Diagnosis   A. Prostate, left base, biopsy-  Adenocarcinoma, Aleksandar's grade 3/3 in 1 of 2 needle core biopsies and 10% of submitted tissue    B. Prostate, left mid, biopsy-  Benign prostate tissue    C.  Prostate, left apex, biopsy-  Adenocarcinoma, Aleksandar's grade 3/4 in 2 of 2 needle core biopsies and 10% of submitted tissue    D.  Prostate, right base, biopsy-  Benign prostate tissue    E.  Prostate, right mid, biopsy-  Benign prostate tissue    F.  Prostate, right apex, biopsy  Adenocarcinoma, Aleksandar's grade 3/4 in 2 of 2 needle core biopsies and 15% of submitted tissue    G.  Prostate, not otherwise specified, target #1, biopsy-  Adenocarcinoma,  Aleksandar's grade 3/4 in 2 of 2 needle core biopsies and 15% of submitted tissue    H.  Prostate, not otherwise specified, target #2, biopsy-  Atypical small gland proliferation, suspicious for adenocarcinoma      PSA  10/12/2021 - 15.10  8/3/2021 - 14.60  12/18/2018 - 9.96     MRI Prostate 10/12/2021  IMPRESSION:  1. Based on the most suspicious abnormality, this exam is  characterized as PIRADS 4 - Clinically significant cancer is likely to  be present.  The most suspicious abnormality is located at the Right  apex peripheral zone at the 7 o'clock and there is minimal capsular  abutment with no convincing evidence of extraprostatic extension. The  diffusion restriction in this region is new from comparison exam.  2. PIRADS 3 area within the left apex peripheral zone at the 5 o'clock  position.  3. No suspicious adenopathy or evidence of pelvic metastases.    I have reviewed and updated the patient's Past Medical History, Social History, Family History and Medication List.    Review of Systems   Constitutional, HEENT, cardiovascular, pulmonary, gi and gu systems are negative, except as otherwise noted.    ALLERGIES  Dust mites and Mold    Vitals:  No vitals were obtained today due to virtual visit.    Physical Exam   healthy, alert and no distress  PSYCH: Alert and oriented times 3; coherent speech, normal   rate and volume, able to articulate logical thoughts, able   to abstract reason, no tangential thoughts, no hallucinations   or delusions  His affect is normal and pleasant  RESP: No cough, no audible wheezing, able to talk in full sentences  Remainder of exam unable to be completed due to telephone visits    Outside and Past Medical records:  Review of the result(s) of each unique test - PSA, MRI, pathology    ASSESSMENT and PLAN  Andre Alas is a 71 with previous Aleksandar 3+3 prostate cancer since 2018 on active surveillance, but now with Dewitt 3+4=7 disease on repeat biopsy, with rising PSA. History of BPH  with partial HoLEP in 2018 with resection of median lobe. MRI with PIRADS 4 lesion. We had an extensive discussion about the significance of localized, prostate cancer. We discussed the options for treatment of a localized prostate cancer including active surveillance, brachytherapy, external beam radiation therapy, and surgical removal. We discussed that based on his Aleksandar score he is no longer an ideal candidate for active surveillance.       We discussed the relative merits of robotic assisted radical prostatectomy. We also discussed the advantages and disadvantages and roles of open surgery vs. laparoscopic (and Da Tati assisted) surgery. The anticipated post-operative course was explained, including an anticipated 1-2 day hospital stay. Catheter will remain in place 10-14 days.      We discussed that there was no clear evidence of advantage between surgery and radiation with regard to risk of recurrence. Regarding radiation, we discussed risks including but not limited to cancer recurrence, exacerbation of voiding symptoms or hematuria, urinary retention, incontinence, stricture of the urinary tract, induction of second malignancy, and risks of rectal symptoms or bleeding.  We discussed fact that rectal symptoms may be more common with radiation than with other treatment modalities. With radiation therapy, we also discussed the difficulties in diagnosing recurrent disease at an early stage due to variability in PSA levels and the fact that most patients are not candidates for local salvage therapy when biochemical recurrence is declared.  We also discussed the significant morbidity of post-radiation local salvage therapy in terms of perioperative complications, erectile dysfunction and urinary incontinence. With surgery, we also discussed the potential advantage over radiation therapy in that biochemical recurrence can be detected at a relatively earlier stage and that salvage radiotherapy is successful in  controlling recurrent disease in a substantial proportion of patients.  We also discussed that salvage radiotherapy was associated with a considerably more favorable morbidity profile compared to local salvage therapies for recurrent disease post-radiation.     We discussed option for further discussion with radiation oncology, but patient is interested in surgery and would prefer to defer at this time.      The risks, benefits, alternatives, and personnel involved with robotic prostatectomy were discussed in detail. Specifically, we discussed that risks include but are not limited to anesthetic complications including stroke, MI, DVT/PE, as well as risk of bleeding requiring transfusion, bowel injury, infection, lymphocele, ureteral injury, nerve injury,urine leak and other potentially unforseen complications. Also discussed the risk of bladder neck contracture and other urinary symptoms after procedure. In addition, we discussed risk long-term of urinary incontinence and erectile dysfunction following the procedure. Finally, we discussed risk for adverse pathologic features, including positive surgical margins and potential for post-surgical radiation, hormone ablation and long-term need for PSA monitoring.  All questions were answered in detail.  A written informed consent will be finalized on the morning of the procedure.     - Schedule for robotic-assisted laparoscopic radical prostatectomy with bilateral pelvic lymphadenectomy    Phone call duration: 27 minutes    33 total minutes spent on the date of the encounter including direct interaction with the patient, performing chart review, documentation and further activities as noted above.    Akil Pink MD  Urology  [facility]

## 2022-01-04 ENCOUNTER — TRANSFERRED RECORDS (OUTPATIENT)
Dept: HEALTH INFORMATION MANAGEMENT | Facility: CLINIC | Age: 72
End: 2022-01-04

## 2022-01-04 LAB
CREATININE (EXTERNAL): 1.14 MG/DL (ref 0.73–1.18)
GFR ESTIMATED (EXTERNAL): >60 ML/MIN/1.73M2
GLUCOSE (EXTERNAL): 90 MG/DL (ref 70–100)
HBA1C MFR BLD: 5.3 %
POTASSIUM (EXTERNAL): 4 MMOL/L (ref 3.5–5.1)

## 2022-01-12 ENCOUNTER — DOCUMENTATION ONLY (OUTPATIENT)
Dept: UROLOGY | Facility: CLINIC | Age: 72
End: 2022-01-12
Payer: COMMERCIAL

## 2022-01-12 NOTE — CONFIDENTIAL NOTE
Action 01/12/2022 jTV 10:14am   Action Taken CSS received records from . Records sent to scanning for processing.

## 2022-01-26 DIAGNOSIS — Z11.59 ENCOUNTER FOR SCREENING FOR OTHER VIRAL DISEASES: Primary | ICD-10-CM

## 2022-02-01 ENCOUNTER — LAB (OUTPATIENT)
Dept: LAB | Facility: CLINIC | Age: 72
End: 2022-02-01
Attending: UROLOGY
Payer: COMMERCIAL

## 2022-02-01 DIAGNOSIS — Z11.59 ENCOUNTER FOR SCREENING FOR OTHER VIRAL DISEASES: ICD-10-CM

## 2022-02-01 PROCEDURE — U0003 INFECTIOUS AGENT DETECTION BY NUCLEIC ACID (DNA OR RNA); SEVERE ACUTE RESPIRATORY SYNDROME CORONAVIRUS 2 (SARS-COV-2) (CORONAVIRUS DISEASE [COVID-19]), AMPLIFIED PROBE TECHNIQUE, MAKING USE OF HIGH THROUGHPUT TECHNOLOGIES AS DESCRIBED BY CMS-2020-01-R: HCPCS

## 2022-02-01 PROCEDURE — U0005 INFEC AGEN DETEC AMPLI PROBE: HCPCS

## 2022-02-02 LAB — SARS-COV-2 RNA RESP QL NAA+PROBE: NEGATIVE

## 2022-02-03 NOTE — PROGRESS NOTES
PTA medications updated by Medication Scribe prior to surgery via phone call with patient (last doses completed by Nurse)     Medication history sources: Patient, Surescripts and H&P  In the past week, patient estimated taking medication this percent of the time: Greater than 90%  Adherence assessment: N/A Not Observed    Significant changes made to the medication list:  Patient reports no longer taking the following meds (med scribe removed from PTA med list): Atorvastatin, Eliquis, Levofloxacin, Ascorbic Acid      Additional medication history information:   None    Medication reconciliation completed by provider prior to medication history? No    Time spent in this activity: 25 minutes    The information provided in this note is only as accurate as the sources available at the time of update(s)    Prior to Admission medications    Medication Sig Last Dose Taking? Auth Provider   Ascorbic Acid (VITAMIN C PO) Take 1 tablet by mouth twice a week  2/2/2022 at am Yes Reported, Patient   B Complex Vitamins (VITAMIN B COMPLEX PO) Take 1 tablet by mouth twice a week  2/2/2022 at am Yes Reported, Patient   lisinopril (ZESTRIL) 5 MG tablet Take 5 mg by mouth 2 times daily  2/3/2022 at pm Yes Reported, Patient   rivaroxaban ANTICOAGULANT (XARELTO) 10 MG TABS tablet Take 10 mg by mouth daily (with dinner) 1/30/2022 at pm Yes Reported, Patient   tamsulosin (FLOMAX) 0.4 MG capsule Take 1 capsule (0.4 mg) by mouth daily 2/3/2022 at pm Yes Akil Pink MD   vitamin E (TOCOPHEROL) 100 UNIT capsule Take 100 Units by mouth twice a week  1/30/2022 at am Yes Unknown, Entered By History   Zinc Gluconate (ZINC) 100 MG TABS Take 1 tablet by mouth twice a week  1/30/2022 at am Yes Reported, Patient     Medication history completed by:    Armando Gardner CPhT  Medication Scribe  [facility]

## 2022-02-04 ENCOUNTER — ANESTHESIA (OUTPATIENT)
Dept: SURGERY | Facility: CLINIC | Age: 72
End: 2022-02-04
Payer: COMMERCIAL

## 2022-02-04 ENCOUNTER — HOSPITAL ENCOUNTER (OUTPATIENT)
Facility: CLINIC | Age: 72
Discharge: HOME | End: 2022-02-05
Attending: UROLOGY | Admitting: UROLOGY
Payer: COMMERCIAL

## 2022-02-04 ENCOUNTER — ANESTHESIA EVENT (OUTPATIENT)
Dept: SURGERY | Facility: CLINIC | Age: 72
End: 2022-02-04
Payer: COMMERCIAL

## 2022-02-04 DIAGNOSIS — C61 PROSTATE CANCER (H): Primary | ICD-10-CM

## 2022-02-04 LAB
ABO/RH(D): NORMAL
ANTIBODY SCREEN: NEGATIVE
POTASSIUM BLD-SCNC: 4.4 MMOL/L (ref 3.4–5.3)
SPECIMEN EXPIRATION DATE: NORMAL

## 2022-02-04 PROCEDURE — 38571 LAPAROSCOPY LYMPHADENECTOMY: CPT | Performed by: UROLOGY

## 2022-02-04 PROCEDURE — 88309 TISSUE EXAM BY PATHOLOGIST: CPT | Mod: TC | Performed by: UROLOGY

## 2022-02-04 PROCEDURE — 370N000017 HC ANESTHESIA TECHNICAL FEE, PER MIN: Performed by: UROLOGY

## 2022-02-04 PROCEDURE — 250N000009 HC RX 250: Performed by: NURSE ANESTHETIST, CERTIFIED REGISTERED

## 2022-02-04 PROCEDURE — 250N000011 HC RX IP 250 OP 636: Performed by: UROLOGY

## 2022-02-04 PROCEDURE — 272N000001 HC OR GENERAL SUPPLY STERILE: Performed by: UROLOGY

## 2022-02-04 PROCEDURE — 250N000011 HC RX IP 250 OP 636: Performed by: NURSE ANESTHETIST, CERTIFIED REGISTERED

## 2022-02-04 PROCEDURE — 86901 BLOOD TYPING SEROLOGIC RH(D): CPT | Performed by: UROLOGY

## 2022-02-04 PROCEDURE — 250N000009 HC RX 250: Performed by: UROLOGY

## 2022-02-04 PROCEDURE — 250N000011 HC RX IP 250 OP 636: Performed by: ANESTHESIOLOGY

## 2022-02-04 PROCEDURE — 360N000080 HC SURGERY LEVEL 7, PER MIN: Performed by: UROLOGY

## 2022-02-04 PROCEDURE — 710N000009 HC RECOVERY PHASE 1, LEVEL 1, PER MIN: Performed by: UROLOGY

## 2022-02-04 PROCEDURE — 36415 COLL VENOUS BLD VENIPUNCTURE: CPT | Performed by: ANESTHESIOLOGY

## 2022-02-04 PROCEDURE — 250N000025 HC SEVOFLURANE, PER MIN: Performed by: UROLOGY

## 2022-02-04 PROCEDURE — 258N000003 HC RX IP 258 OP 636: Performed by: NURSE ANESTHETIST, CERTIFIED REGISTERED

## 2022-02-04 PROCEDURE — 84132 ASSAY OF SERUM POTASSIUM: CPT | Performed by: ANESTHESIOLOGY

## 2022-02-04 PROCEDURE — 258N000001 HC RX 258: Performed by: UROLOGY

## 2022-02-04 PROCEDURE — 999N000141 HC STATISTIC PRE-PROCEDURE NURSING ASSESSMENT: Performed by: UROLOGY

## 2022-02-04 PROCEDURE — 258N000003 HC RX IP 258 OP 636: Performed by: ANESTHESIOLOGY

## 2022-02-04 PROCEDURE — 88307 TISSUE EXAM BY PATHOLOGIST: CPT | Mod: TC | Performed by: UROLOGY

## 2022-02-04 PROCEDURE — 250N000009 HC RX 250: Performed by: ANESTHESIOLOGY

## 2022-02-04 PROCEDURE — 258N000003 HC RX IP 258 OP 636

## 2022-02-04 PROCEDURE — 55866 LAPS SURG PRST8ECT RPBIC RAD: CPT | Performed by: UROLOGY

## 2022-02-04 RX ORDER — ONDANSETRON 2 MG/ML
INJECTION INTRAMUSCULAR; INTRAVENOUS PRN
Status: DISCONTINUED | OUTPATIENT
Start: 2022-02-04 | End: 2022-02-04

## 2022-02-04 RX ORDER — AMOXICILLIN 250 MG
1 CAPSULE ORAL 2 TIMES DAILY
Status: DISCONTINUED | OUTPATIENT
Start: 2022-02-04 | End: 2022-02-05 | Stop reason: HOSPADM

## 2022-02-04 RX ORDER — FENTANYL CITRATE 0.05 MG/ML
25 INJECTION, SOLUTION INTRAMUSCULAR; INTRAVENOUS EVERY 5 MIN PRN
Status: DISCONTINUED | OUTPATIENT
Start: 2022-02-04 | End: 2022-02-04 | Stop reason: HOSPADM

## 2022-02-04 RX ORDER — PROPOFOL 10 MG/ML
INJECTION, EMULSION INTRAVENOUS PRN
Status: DISCONTINUED | OUTPATIENT
Start: 2022-02-04 | End: 2022-02-04

## 2022-02-04 RX ORDER — DEXAMETHASONE SODIUM PHOSPHATE 4 MG/ML
INJECTION, SOLUTION INTRA-ARTICULAR; INTRALESIONAL; INTRAMUSCULAR; INTRAVENOUS; SOFT TISSUE PRN
Status: DISCONTINUED | OUTPATIENT
Start: 2022-02-04 | End: 2022-02-04

## 2022-02-04 RX ORDER — GLYCOPYRROLATE 0.2 MG/ML
INJECTION, SOLUTION INTRAMUSCULAR; INTRAVENOUS PRN
Status: DISCONTINUED | OUTPATIENT
Start: 2022-02-04 | End: 2022-02-04

## 2022-02-04 RX ORDER — HYDROMORPHONE HCL IN WATER/PF 6 MG/30 ML
0.2 PATIENT CONTROLLED ANALGESIA SYRINGE INTRAVENOUS
Status: DISCONTINUED | OUTPATIENT
Start: 2022-02-04 | End: 2022-02-05 | Stop reason: HOSPADM

## 2022-02-04 RX ORDER — MAGNESIUM HYDROXIDE 1200 MG/15ML
LIQUID ORAL PRN
Status: DISCONTINUED | OUTPATIENT
Start: 2022-02-04 | End: 2022-02-04 | Stop reason: HOSPADM

## 2022-02-04 RX ORDER — LIDOCAINE 40 MG/G
CREAM TOPICAL
Status: DISCONTINUED | OUTPATIENT
Start: 2022-02-04 | End: 2022-02-05 | Stop reason: HOSPADM

## 2022-02-04 RX ORDER — ONDANSETRON 2 MG/ML
4 INJECTION INTRAMUSCULAR; INTRAVENOUS EVERY 6 HOURS PRN
Status: DISCONTINUED | OUTPATIENT
Start: 2022-02-04 | End: 2022-02-05 | Stop reason: HOSPADM

## 2022-02-04 RX ORDER — SODIUM CHLORIDE, SODIUM LACTATE, POTASSIUM CHLORIDE, CALCIUM CHLORIDE 600; 310; 30; 20 MG/100ML; MG/100ML; MG/100ML; MG/100ML
INJECTION, SOLUTION INTRAVENOUS CONTINUOUS
Status: DISCONTINUED | OUTPATIENT
Start: 2022-02-04 | End: 2022-02-04 | Stop reason: HOSPADM

## 2022-02-04 RX ORDER — ONDANSETRON 2 MG/ML
4 INJECTION INTRAMUSCULAR; INTRAVENOUS EVERY 30 MIN PRN
Status: DISCONTINUED | OUTPATIENT
Start: 2022-02-04 | End: 2022-02-04 | Stop reason: HOSPADM

## 2022-02-04 RX ORDER — SODIUM CHLORIDE 9 MG/ML
INJECTION, SOLUTION INTRAVENOUS CONTINUOUS
Status: DISCONTINUED | OUTPATIENT
Start: 2022-02-04 | End: 2022-02-05 | Stop reason: HOSPADM

## 2022-02-04 RX ORDER — MEPERIDINE HYDROCHLORIDE 25 MG/ML
12.5 INJECTION INTRAMUSCULAR; INTRAVENOUS; SUBCUTANEOUS EVERY 5 MIN PRN
Status: DISCONTINUED | OUTPATIENT
Start: 2022-02-04 | End: 2022-02-04 | Stop reason: HOSPADM

## 2022-02-04 RX ORDER — CEFAZOLIN SODIUM/WATER 2 G/20 ML
2 SYRINGE (ML) INTRAVENOUS SEE ADMIN INSTRUCTIONS
Status: DISCONTINUED | OUTPATIENT
Start: 2022-02-04 | End: 2022-02-04 | Stop reason: HOSPADM

## 2022-02-04 RX ORDER — ACETAMINOPHEN 325 MG/1
975 TABLET ORAL EVERY 6 HOURS
Status: DISCONTINUED | OUTPATIENT
Start: 2022-02-04 | End: 2022-02-05 | Stop reason: HOSPADM

## 2022-02-04 RX ORDER — LIDOCAINE HYDROCHLORIDE 20 MG/ML
INJECTION, SOLUTION INFILTRATION; PERINEURAL PRN
Status: DISCONTINUED | OUTPATIENT
Start: 2022-02-04 | End: 2022-02-04

## 2022-02-04 RX ORDER — FENTANYL CITRATE 50 UG/ML
INJECTION, SOLUTION INTRAMUSCULAR; INTRAVENOUS PRN
Status: DISCONTINUED | OUTPATIENT
Start: 2022-02-04 | End: 2022-02-04

## 2022-02-04 RX ORDER — ONDANSETRON 4 MG/1
4 TABLET, ORALLY DISINTEGRATING ORAL EVERY 30 MIN PRN
Status: DISCONTINUED | OUTPATIENT
Start: 2022-02-04 | End: 2022-02-04 | Stop reason: HOSPADM

## 2022-02-04 RX ORDER — POLYETHYLENE GLYCOL 3350 17 G/17G
17 POWDER, FOR SOLUTION ORAL DAILY
Status: DISCONTINUED | OUTPATIENT
Start: 2022-02-05 | End: 2022-02-05 | Stop reason: HOSPADM

## 2022-02-04 RX ORDER — BUPIVACAINE HYDROCHLORIDE 2.5 MG/ML
INJECTION, SOLUTION INFILTRATION; PERINEURAL PRN
Status: DISCONTINUED | OUTPATIENT
Start: 2022-02-04 | End: 2022-02-04 | Stop reason: HOSPADM

## 2022-02-04 RX ORDER — NALOXONE HYDROCHLORIDE 0.4 MG/ML
0.2 INJECTION, SOLUTION INTRAMUSCULAR; INTRAVENOUS; SUBCUTANEOUS
Status: DISCONTINUED | OUTPATIENT
Start: 2022-02-04 | End: 2022-02-05 | Stop reason: HOSPADM

## 2022-02-04 RX ORDER — OXYCODONE HYDROCHLORIDE 5 MG/1
5 TABLET ORAL EVERY 4 HOURS PRN
Status: DISCONTINUED | OUTPATIENT
Start: 2022-02-04 | End: 2022-02-04 | Stop reason: HOSPADM

## 2022-02-04 RX ORDER — OXYCODONE HYDROCHLORIDE 5 MG/1
10 TABLET ORAL EVERY 4 HOURS PRN
Status: DISCONTINUED | OUTPATIENT
Start: 2022-02-04 | End: 2022-02-05 | Stop reason: HOSPADM

## 2022-02-04 RX ORDER — HEPARIN SODIUM 5000 [USP'U]/.5ML
5000 INJECTION, SOLUTION INTRAVENOUS; SUBCUTANEOUS
Status: COMPLETED | OUTPATIENT
Start: 2022-02-04 | End: 2022-02-04

## 2022-02-04 RX ORDER — EPHEDRINE SULFATE 50 MG/ML
INJECTION, SOLUTION INTRAMUSCULAR; INTRAVENOUS; SUBCUTANEOUS PRN
Status: DISCONTINUED | OUTPATIENT
Start: 2022-02-04 | End: 2022-02-04

## 2022-02-04 RX ORDER — HYDROMORPHONE HCL IN WATER/PF 6 MG/30 ML
0.2 PATIENT CONTROLLED ANALGESIA SYRINGE INTRAVENOUS EVERY 5 MIN PRN
Status: DISCONTINUED | OUTPATIENT
Start: 2022-02-04 | End: 2022-02-04 | Stop reason: HOSPADM

## 2022-02-04 RX ORDER — OXYCODONE HYDROCHLORIDE 5 MG/1
5 TABLET ORAL EVERY 4 HOURS PRN
Status: DISCONTINUED | OUTPATIENT
Start: 2022-02-04 | End: 2022-02-05 | Stop reason: HOSPADM

## 2022-02-04 RX ORDER — ONDANSETRON 4 MG/1
4 TABLET, ORALLY DISINTEGRATING ORAL EVERY 6 HOURS PRN
Status: DISCONTINUED | OUTPATIENT
Start: 2022-02-04 | End: 2022-02-05 | Stop reason: HOSPADM

## 2022-02-04 RX ORDER — PROCHLORPERAZINE MALEATE 5 MG
5 TABLET ORAL EVERY 6 HOURS PRN
Status: DISCONTINUED | OUTPATIENT
Start: 2022-02-04 | End: 2022-02-05 | Stop reason: HOSPADM

## 2022-02-04 RX ORDER — HYDROMORPHONE HCL IN WATER/PF 6 MG/30 ML
0.4 PATIENT CONTROLLED ANALGESIA SYRINGE INTRAVENOUS
Status: DISCONTINUED | OUTPATIENT
Start: 2022-02-04 | End: 2022-02-05 | Stop reason: HOSPADM

## 2022-02-04 RX ORDER — LABETALOL HYDROCHLORIDE 5 MG/ML
5 INJECTION, SOLUTION INTRAVENOUS
Status: DISCONTINUED | OUTPATIENT
Start: 2022-02-04 | End: 2022-02-04 | Stop reason: HOSPADM

## 2022-02-04 RX ORDER — CEFAZOLIN SODIUM/WATER 2 G/20 ML
2 SYRINGE (ML) INTRAVENOUS
Status: COMPLETED | OUTPATIENT
Start: 2022-02-04 | End: 2022-02-04

## 2022-02-04 RX ORDER — NEOMYCIN/BACITRACIN/POLYMYXINB 3.5-400-5K
OINTMENT (GRAM) TOPICAL 4 TIMES DAILY PRN
Status: DISCONTINUED | OUTPATIENT
Start: 2022-02-04 | End: 2022-02-05 | Stop reason: HOSPADM

## 2022-02-04 RX ORDER — NALOXONE HYDROCHLORIDE 0.4 MG/ML
0.4 INJECTION, SOLUTION INTRAMUSCULAR; INTRAVENOUS; SUBCUTANEOUS
Status: DISCONTINUED | OUTPATIENT
Start: 2022-02-04 | End: 2022-02-05 | Stop reason: HOSPADM

## 2022-02-04 RX ORDER — HYDRALAZINE HYDROCHLORIDE 20 MG/ML
2.5-5 INJECTION INTRAMUSCULAR; INTRAVENOUS
Status: DISCONTINUED | OUTPATIENT
Start: 2022-02-04 | End: 2022-02-04 | Stop reason: HOSPADM

## 2022-02-04 RX ORDER — NEOSTIGMINE METHYLSULFATE 1 MG/ML
VIAL (ML) INJECTION PRN
Status: DISCONTINUED | OUTPATIENT
Start: 2022-02-04 | End: 2022-02-04

## 2022-02-04 RX ORDER — PROPOFOL 10 MG/ML
INJECTION, EMULSION INTRAVENOUS CONTINUOUS PRN
Status: DISCONTINUED | OUTPATIENT
Start: 2022-02-04 | End: 2022-02-04

## 2022-02-04 RX ADMIN — HYDROMORPHONE HYDROCHLORIDE 0.2 MG: 0.2 INJECTION, SOLUTION INTRAMUSCULAR; INTRAVENOUS; SUBCUTANEOUS at 18:39

## 2022-02-04 RX ADMIN — DEXAMETHASONE SODIUM PHOSPHATE 4 MG: 4 INJECTION, SOLUTION INTRA-ARTICULAR; INTRALESIONAL; INTRAMUSCULAR; INTRAVENOUS; SOFT TISSUE at 13:25

## 2022-02-04 RX ADMIN — PHENYLEPHRINE HYDROCHLORIDE 100 MCG: 10 INJECTION INTRAVENOUS at 14:49

## 2022-02-04 RX ADMIN — GLYCOPYRROLATE 0.6 MG: 0.2 INJECTION, SOLUTION INTRAMUSCULAR; INTRAVENOUS at 17:33

## 2022-02-04 RX ADMIN — ROCURONIUM BROMIDE 20 MG: 50 INJECTION, SOLUTION INTRAVENOUS at 16:24

## 2022-02-04 RX ADMIN — ROCURONIUM BROMIDE 10 MG: 50 INJECTION, SOLUTION INTRAVENOUS at 15:05

## 2022-02-04 RX ADMIN — Medication 5 MG: at 15:17

## 2022-02-04 RX ADMIN — ROCURONIUM BROMIDE 50 MG: 50 INJECTION, SOLUTION INTRAVENOUS at 13:20

## 2022-02-04 RX ADMIN — FENTANYL CITRATE 50 MCG: 50 INJECTION, SOLUTION INTRAMUSCULAR; INTRAVENOUS at 13:19

## 2022-02-04 RX ADMIN — Medication 2 G: at 17:12

## 2022-02-04 RX ADMIN — PHENYLEPHRINE HYDROCHLORIDE 100 MCG: 10 INJECTION INTRAVENOUS at 17:25

## 2022-02-04 RX ADMIN — PHENYLEPHRINE HYDROCHLORIDE 50 MCG: 10 INJECTION INTRAVENOUS at 16:08

## 2022-02-04 RX ADMIN — FENTANYL CITRATE 50 MCG: 50 INJECTION, SOLUTION INTRAMUSCULAR; INTRAVENOUS at 13:45

## 2022-02-04 RX ADMIN — LIDOCAINE HYDROCHLORIDE 100 MG: 20 INJECTION, SOLUTION INFILTRATION; PERINEURAL at 13:19

## 2022-02-04 RX ADMIN — GLYCOPYRROLATE 0.2 MG: 0.2 INJECTION, SOLUTION INTRAMUSCULAR; INTRAVENOUS at 17:53

## 2022-02-04 RX ADMIN — Medication 2 G: at 13:12

## 2022-02-04 RX ADMIN — Medication 5 MG: at 16:08

## 2022-02-04 RX ADMIN — PHENYLEPHRINE HYDROCHLORIDE 100 MCG: 10 INJECTION INTRAVENOUS at 16:22

## 2022-02-04 RX ADMIN — PHENYLEPHRINE HYDROCHLORIDE 100 MCG: 10 INJECTION INTRAVENOUS at 15:17

## 2022-02-04 RX ADMIN — GLYCOPYRROLATE 0.1 MG: 0.2 INJECTION, SOLUTION INTRAMUSCULAR; INTRAVENOUS at 13:50

## 2022-02-04 RX ADMIN — PROPOFOL 200 MG: 10 INJECTION, EMULSION INTRAVENOUS at 13:19

## 2022-02-04 RX ADMIN — SODIUM CHLORIDE: 9 INJECTION, SOLUTION INTRAVENOUS at 20:21

## 2022-02-04 RX ADMIN — SODIUM CHLORIDE, POTASSIUM CHLORIDE, SODIUM LACTATE AND CALCIUM CHLORIDE: 600; 310; 30; 20 INJECTION, SOLUTION INTRAVENOUS at 14:57

## 2022-02-04 RX ADMIN — NEOSTIGMINE METHYLSULFATE 4 MG: 1 INJECTION, SOLUTION INTRAVENOUS at 17:33

## 2022-02-04 RX ADMIN — PROPOFOL 30 MCG/KG/MIN: 10 INJECTION, EMULSION INTRAVENOUS at 13:26

## 2022-02-04 RX ADMIN — PHENYLEPHRINE HYDROCHLORIDE 0.4 MCG/KG/MIN: 10 INJECTION INTRAVENOUS at 16:22

## 2022-02-04 RX ADMIN — NEOSTIGMINE METHYLSULFATE 1 MG: 1 INJECTION, SOLUTION INTRAVENOUS at 17:53

## 2022-02-04 RX ADMIN — GLYCOPYRROLATE 0.2 MG: 0.2 INJECTION, SOLUTION INTRAMUSCULAR; INTRAVENOUS at 13:49

## 2022-02-04 RX ADMIN — MIDAZOLAM 2 MG: 1 INJECTION INTRAMUSCULAR; INTRAVENOUS at 13:12

## 2022-02-04 RX ADMIN — Medication 10 MG: at 13:50

## 2022-02-04 RX ADMIN — ONDANSETRON 4 MG: 2 INJECTION INTRAMUSCULAR; INTRAVENOUS at 17:19

## 2022-02-04 RX ADMIN — PHENYLEPHRINE HYDROCHLORIDE 150 MCG: 10 INJECTION INTRAVENOUS at 16:19

## 2022-02-04 RX ADMIN — ROCURONIUM BROMIDE 20 MG: 50 INJECTION, SOLUTION INTRAVENOUS at 15:16

## 2022-02-04 RX ADMIN — HYDROMORPHONE HYDROCHLORIDE 0.5 MG: 1 INJECTION, SOLUTION INTRAMUSCULAR; INTRAVENOUS; SUBCUTANEOUS at 17:28

## 2022-02-04 RX ADMIN — ROCURONIUM BROMIDE 20 MG: 50 INJECTION, SOLUTION INTRAVENOUS at 14:00

## 2022-02-04 RX ADMIN — HEPARIN SODIUM 5000 UNITS: 5000 INJECTION INTRAVENOUS; SUBCUTANEOUS at 12:41

## 2022-02-04 RX ADMIN — HYDROMORPHONE HYDROCHLORIDE 0.2 MG: 0.2 INJECTION, SOLUTION INTRAMUSCULAR; INTRAVENOUS; SUBCUTANEOUS at 18:51

## 2022-02-04 ASSESSMENT — COPD QUESTIONNAIRES: COPD: 0

## 2022-02-04 ASSESSMENT — ENCOUNTER SYMPTOMS
SEIZURES: 0
DYSRHYTHMIAS: 1

## 2022-02-04 ASSESSMENT — ACTIVITIES OF DAILY LIVING (ADL)
ADLS_ACUITY_SCORE: 6
ADLS_ACUITY_SCORE: 14
ADLS_ACUITY_SCORE: 6
ADLS_ACUITY_SCORE: 6
ADLS_ACUITY_SCORE: 14

## 2022-02-04 ASSESSMENT — MIFFLIN-ST. JEOR: SCORE: 1539.64

## 2022-02-04 ASSESSMENT — LIFESTYLE VARIABLES: TOBACCO_USE: 0

## 2022-02-04 NOTE — ANESTHESIA PROCEDURE NOTES
Airway       Patient location during procedure: OR       Procedure Start/Stop Times: 2/4/2022 1:23 PM  Staff -        Performed By: anesthesiologist and CRNA  Consent for Airway        Urgency: elective  Indications and Patient Condition       Indications for airway management: irina-procedural       Induction type:intravenous       Mask difficulty assessment: 1 - vent by mask    Final Airway Details       Final airway type: endotracheal airway       Successful airway: ETT - single  Endotracheal Airway Details        ETT size (mm): 8.0       Cuffed: yes       Successful intubation technique: direct laryngoscopy       DL Blade Type: MAC 3       Grade View of Cords: 1       Adjucts: stylet       Position: Right       Measured from: gums/teeth       Secured at (cm): 22       Bite block used: None    Post intubation assessment        Placement verified by: capnometry, equal breath sounds and chest rise        Number of attempts at approach: 1       Number of other approaches attempted: 0       Secured with: pink tape and silk tape       Ease of procedure: easy       Dentition: Intact and Unchanged

## 2022-02-04 NOTE — ANESTHESIA PREPROCEDURE EVALUATION
Anesthesia Pre-Procedure Evaluation    Patient: Andre Alas   MRN: [id number] : 1950        Preoperative Diagnosis: Prostate cancer (H) [C61]    Procedure : Procedure(s):  ROBOTIC ASSISTED LAPAROSCOPIC RADICAL PROSTATECTOMY BILATERAL PELVIC LYMPHADENECTOMY POSSIBLE OPEN          Past Medical History:   Diagnosis Date     A-fib (H)      Allergic rhinitis due to dust 2011     Atrioventricular canal (AVC), incomplete      BPH without urinary obstruction 2011     Complication of anesthesia      CVA (cerebral vascular accident) (H) 2014    embolic 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     Hernia, abdominal      Hyperlipidemia LDL goal <70 2011     Hypertension goal BP (blood pressure) < 140/90      Mumps      NSTEMI (non-ST elevated myocardial infarction) (H) 2014    prob sec to PAF -- no sig CAD     PAF (paroxysmal atrial fibrillation) (H) 2014     Palpitations      Prostate cancer (H) 2018      Past Surgical History:   Procedure Laterality Date     ANGIOGRAM      normal - non ST MI     BLADDER SURGERY       COLONOSCOPY       CYSTOSCOPY       DENTAL SURGERY       HERNIA REPAIR, INGUINAL RT/LT       LASER HOLMIUM ENUCLEATION PROSTATE N/A 2018    Procedure: LASER HOLMIUM ENUCLEATION PROSTATE;  Holmium Laser Enucleation Of The Prostate;  Surgeon: Henry Oliveros MD;  Location: [facility] OR     PROSTATE SURGERY        Allergies   Allergen Reactions     Dust Mites      Mold       Social History     Tobacco Use     Smoking status: Never Smoker     Smokeless tobacco: Never Used   Substance Use Topics     Alcohol use: Not Currently     Alcohol/week: 0.0 standard drinks      Wt Readings from Last 1 Encounters:   22 78.9 kg (174 lb)        Anesthesia Evaluation   Pt has had prior anesthetic. Type: General ( Nix 2 = grade 1 view).        ROS/MED HX  ENT/Pulmonary:     (+) allergic rhinitis,  (-) tobacco use, asthma, COPD and sleep apnea   Neurologic:     (+) CVA,  (-) no seizures    Cardiovascular: Comment: History of incomplete AV canal    (+) hypertension--CAD -past MI --dysrhythmias, a-fib,  (-) CHF   METS/Exercise Tolerance:     Hematologic:       Musculoskeletal:       GI/Hepatic:    (-) GERD and liver disease   Renal/Genitourinary:     (+) BPH,  (-) renal disease   Endo:    (-) Type I DM and Type II DM   Psychiatric/Substance Use:       Infectious Disease:       Malignancy:   (+) Malignancy, History of Prostate.    Other:            Physical Exam    Airway        Mallampati: II   TM distance: > 3 FB   Neck ROM: full   Mouth opening: > 3 cm    Respiratory Devices and Support         Dental  no notable dental history         Cardiovascular          Rhythm and rate: regular     Pulmonary           breath sounds clear to auscultation           OUTSIDE LABS:  CBC:   Lab Results   Component Value Date    WBC 5.3 08/03/2021    WBC 8.2 09/28/2018    HGB 15.1 08/03/2021    HGB 15.0 09/28/2018    HCT 42.7 08/03/2021    HCT 44.6 09/28/2018     08/03/2021     (L) 09/28/2018     BMP:   Lab Results   Component Value Date     08/03/2021     10/23/2018    POTASSIUM 4.3 08/03/2021    POTASSIUM 4.4 10/23/2018    CHLORIDE 108 08/03/2021    CHLORIDE 107 10/23/2018    CO2 31 08/03/2021    CO2 28 10/23/2018    BUN 19 08/03/2021    BUN 14 10/23/2018    CR 1.22 08/03/2021    CR 1.03 10/23/2018     (H) 08/03/2021    GLC 94 10/23/2018     COAGS: No results found for: PTT, INR, FIBR  POC:   Lab Results   Component Value Date    BGM 88 09/27/2018     HEPATIC:   Lab Results   Component Value Date    ALBUMIN 4.2 09/07/2018    PROTTOTAL 7.9 09/07/2018    ALT 41 09/07/2018    AST 25 09/07/2018    ALKPHOS 96 09/07/2018    BILITOTAL 0.9 09/07/2018     OTHER:   Lab Results   Component Value Date    NUNU 9.1 08/03/2021    MAG 2.0 07/13/2014    TSH 1.33 08/03/2021       Anesthesia Plan    ASA Status:  3      Anesthesia Type: General.   Induction: Intravenous, Propofol.   Maintenance:  Balanced.        Consents    Anesthesia Plan(s) and associated risks, benefits, and realistic alternatives discussed. Questions answered and patient/representative(s) expressed understanding.    - Discussed:     - Discussed with:  Patient         Postoperative Care    Pain management: Multi-modal analgesia.   PONV prophylaxis: Ondansetron (or other 5HT-3), Dexamethasone or Solumedrol, Background Propofol Infusion     Comments:                Justin Villafana MD

## 2022-02-04 NOTE — BRIEF OP NOTE
[facility]    Brief Operative Note    Pre-operative diagnosis: Prostate cancer (H) [C61]  Post-operative diagnosis Same as pre-operative diagnosis    Procedure: Procedure(s):  ROBOTIC ASSISTED LAPAROSCOPIC RADICAL PROSTATECTOMY BILATERAL PELVIC LYMPHADENECTOMY  Surgeon: Surgeon(s) and Role:     * Akil Pink MD - Primary  Anesthesia: General   Estimated Blood Loss: 500 ml    Drains: 20-Fr nava, 19-Fr KAYLA  Specimens:   ID Type Source Tests Collected by Time Destination   1 : prostate and seminal vesicles Tissue Prostate SURGICAL PATHOLOGY EXAM Akil Pink MD 2/4/2022 10:32 AM    2 : Right and left pelvic lymph nodes Tissue Lymph Nodes, Pelvis, Regional SURGICAL PATHOLOGY EXAM Akil Pink MD 2/4/2022  4:46 PM    3 : Right bladder neck margin Tissue Urinary Bladder SURGICAL PATHOLOGY EXAM Akil Pink MD 2/4/2022  3:34 PM      Findings:   HoLEP defect complicating procedure. Posterior bladder defect closed primarily. Anterior tennis racket bladder neck closure to facilitate anastomosis.   Complications: None.  Implants: * No implants in log *    Shayan Mayen MD  Urology Resident

## 2022-02-05 VITALS
WEIGHT: 174 LBS | OXYGEN SATURATION: 94 % | DIASTOLIC BLOOD PRESSURE: 81 MMHG | TEMPERATURE: 99.2 F | BODY MASS INDEX: 25.77 KG/M2 | HEIGHT: 69 IN | SYSTOLIC BLOOD PRESSURE: 140 MMHG | RESPIRATION RATE: 18 BRPM | HEART RATE: 70 BPM

## 2022-02-05 LAB
ANION GAP SERPL CALCULATED.3IONS-SCNC: 5 MMOL/L (ref 3–14)
BUN SERPL-MCNC: 20 MG/DL (ref 7–30)
CALCIUM SERPL-MCNC: 8.6 MG/DL (ref 8.5–10.1)
CHLORIDE BLD-SCNC: 105 MMOL/L (ref 94–109)
CO2 SERPL-SCNC: 26 MMOL/L (ref 20–32)
CREAT FLD-MCNC: 1 MG/DL
CREAT SERPL-MCNC: 1.14 MG/DL (ref 0.66–1.25)
ERYTHROCYTE [DISTWIDTH] IN BLOOD BY AUTOMATED COUNT: 12.9 % (ref 10–15)
GFR SERPL CREATININE-BSD FRML MDRD: 69 ML/MIN/1.73M2
GLUCOSE BLD-MCNC: 124 MG/DL (ref 70–99)
HCT VFR BLD AUTO: 42.1 % (ref 40–53)
HGB BLD-MCNC: 14.1 G/DL (ref 13.3–17.7)
MCH RBC QN AUTO: 30.4 PG (ref 26.5–33)
MCHC RBC AUTO-ENTMCNC: 33.5 G/DL (ref 31.5–36.5)
MCV RBC AUTO: 91 FL (ref 78–100)
PLATELET # BLD AUTO: 206 10E3/UL (ref 150–450)
POTASSIUM BLD-SCNC: 4.1 MMOL/L (ref 3.4–5.3)
RBC # BLD AUTO: 4.64 10E6/UL (ref 4.4–5.9)
SODIUM SERPL-SCNC: 136 MMOL/L (ref 133–144)
WBC # BLD AUTO: 15.9 10E3/UL (ref 4–11)

## 2022-02-05 PROCEDURE — 85027 COMPLETE CBC AUTOMATED: CPT

## 2022-02-05 PROCEDURE — 258N000003 HC RX IP 258 OP 636

## 2022-02-05 PROCEDURE — 36415 COLL VENOUS BLD VENIPUNCTURE: CPT

## 2022-02-05 PROCEDURE — 80048 BASIC METABOLIC PNL TOTAL CA: CPT

## 2022-02-05 PROCEDURE — 82570 ASSAY OF URINE CREATININE: CPT

## 2022-02-05 RX ORDER — AMOXICILLIN 250 MG
1 CAPSULE ORAL 2 TIMES DAILY PRN
Qty: 20 TABLET | Refills: 0 | Status: SHIPPED | OUTPATIENT
Start: 2022-02-05 | End: 2022-05-21

## 2022-02-05 RX ORDER — OXYCODONE HYDROCHLORIDE 5 MG/1
5 TABLET ORAL EVERY 6 HOURS PRN
Qty: 9 TABLET | Refills: 0 | Status: SHIPPED | OUTPATIENT
Start: 2022-02-05 | End: 2022-05-21

## 2022-02-05 RX ORDER — CIPROFLOXACIN 500 MG/1
500 TABLET, FILM COATED ORAL ONCE
Qty: 1 TABLET | Refills: 0 | Status: SHIPPED | OUTPATIENT
Start: 2022-02-05 | End: 2022-02-05

## 2022-02-05 RX ADMIN — SODIUM CHLORIDE: 9 INJECTION, SOLUTION INTRAVENOUS at 06:11

## 2022-02-05 NOTE — ANESTHESIA POSTPROCEDURE EVALUATION
Patient: Andre Alas    Procedure: Procedure(s):  ROBOTIC ASSISTED LAPAROSCOPIC RADICAL PROSTATECTOMY BILATERAL PELVIC LYMPHADENECTOMY       Diagnosis:Prostate cancer (H) [C61]  Diagnosis Additional Information: No value filed.    Anesthesia Type:  General    Note:  Disposition: Admission   Postop Pain Control: Uneventful            Sign Out: Well controlled pain   PONV: No   Neuro/Psych: Uneventful            Sign Out: Acceptable/Baseline neuro status   Airway/Respiratory: Uneventful            Sign Out: Acceptable/Baseline resp. status   CV/Hemodynamics: Uneventful            Sign Out: Acceptable CV status; No obvious hypovolemia; No obvious fluid overload   Other NRE: NONE   DID A NON-ROUTINE EVENT OCCUR? No           Last vitals:  Vitals Value Taken Time   /76 02/04/22 1900   Temp 36.4  C (97.5  F) 02/04/22 1900   Pulse 68 02/04/22 1914   Resp 13 02/04/22 1914   SpO2 99 % 02/04/22 1914   Vitals shown include unvalidated device data.    Electronically Signed By: OZIEL JAMES MD  February 4, 2022  7:15 PM

## 2022-02-05 NOTE — OP NOTE
OPERATIVE REPORT  DATE OF PROCEDURE: 2/4/2022  PRE-PROCEDURE DIAGNOSIS: Prostate cancer.   POST-PROCEDURE DIAGNOSIS: Prostate cancer.   PROCEDURES PERFORMED:   1) Robotic-assisted laparoscopic radical prostatectomy  2) Bilateral pelvic lymphadenectomy  SURGEON: Akil Pink MD - Present for the entire procedure  ASSISTANT: Shayan Mayen MD  SECOND ASSISTANT: Claire Vieyra  ANESTHESIA: General with endotracheal intubation.   INDICATIONS FOR PROCEDURE: Andre Alas is a 71 year-old gentleman with a history of Aleksandar 3+4 = 7 adenocarcinoma of the prostate. He has been counseled regarding treatment options and presents to undergo surgery. His history is significant for urinary retention at baseline, and history of previous holmium laser enucleation of the prostate.  He was counseled preoperatively that this previous procedure could complicate prostatectomy and his subsequent recovery.   FINDINGS: Prostate and seminal vesicles removed without complication. Large prostate and bladder neck dissection was notable for previous scar tissue and significantly altered anatomy secondary to previous prostate surgery. Lymph nodes grossly negative. Water-tight anastomosis.  SPECIMENS:    ID Type Source Tests Collected by Time Destination   1 : prostate and seminal vesicles Tissue Prostate SURGICAL PATHOLOGY EXAM Akil Pink MD 2/4/2022 10:32 AM    2 : Right and left pelvic lymph nodes Tissue Lymph Nodes, Pelvis, Regional SURGICAL PATHOLOGY EXAM Akil iPnk MD 2/4/2022  4:46 PM    3 : Right bladder neck margin Tissue Urinary Bladder SURGICAL PATHOLOGY EXAM Akil Pink MD 2/4/2022  3:34 PM      DESCRIPTION OF PROCEDURE: After fully informed voluntary consent was obtained, the patient was brought into the operating room, properly identified and placed in a supine position on the table. General anesthesia was induced, endotracheal intubation performed, IV Ancef administered.  The patient was then positioned appropriately on the table and all pressure points were padded and he was secured to the table with tape. Once the positioning was completed, we proceeded with shaving, prepping and draping the abdomen in the usual sterile fashion. Camacho was placed in the bladder in a sterile manner on the field. A transverse midline incision about 2 cm above the umbilicus was made and a Veress needle introduced into the abdomen through this incision. Once the abdomen was accessed, it was confirmed using a saline drop test and the abdomen was insufflated. Once the abdomen was insufflated, additional robotic ports, 2 on the left and 1 on the right, were placed. A 12mm right-sided assistant port lateral to the robotic ports. Robot was then docked and surgery was commenced.   The sigmoid was adherent to the lateral pelvic sidewall and these adhesions were taken down sharply. A transverse incision was made posterior to the seminal vesicles. Denonvilliers' fascia was incised the rectum was mobilized way from the posterior prostate. The vas deferens on both sides was identified and divided with electrocautery and the seminal vesicles were carefully dissected out with the vasculature being controlled using Lapro clips. Once seminal vesicle and vas deferens were dissected all the way down to the prostate, attention was turned to bringing the bladder down off the anterior wall of the abdomen. Incisions were made lateral the medial umbilical ligaments and carried all the way up to the umbilicus and the urachus was divided horizontally. The bladder was mobilized away from the anterior abdominal wall and the the pelvic sidewalls on both sides. Endopelvic fascia was opened on each side and lateral prostate was dissected away from the levator musculature. An 0-Vicryl suture was placed to ligate the dorsal venous complex.  The anterior bladder neck was then incised at the base of the prostate and dissection was  carried through the anterior urethra to identify the Camacho catheter. The Camacho was then pulled out through this opening in the urethra to give upward traction on the prostate. It was noted at this point that the bladder neck had markedly altered anatomy, as expected, secondary to previous prostate laser enucleation. Posterior bladder neck was then divided. It was noted at this point, that there was some scar tissue that was tethered to the underside of the prostate and a mucosal bridge. The prostate was further lifted and dissected free from the posterior bladder neck. The seminal vesicles and vasa deferentia were lifted into the field. It was noted that due to this altered anatomy, a button-hole defect had been created in the posterior bladder neck dissection. This was repaired with 4-0 vicryl suture in a figure-of-eight fashion. The ureteral orifices were identified and were not injured during this complex dissection or repair. Attention was then turned to the prostatic pedicles. A bilateral nerve-sparing procedure was performed by sharply mobilizing the neurovascular bundles away from the posterior-lateral aspect of the prostate. The prostatic pedicles were then divided using clips on both sides all the way to the apex. The dorsal venous complex was then divided with cautery. The anterior urethra and apex of prostate were dissected free. Urethra was divided and prostate was free. It was placed in an endocatch bag.  Prostatic fossa was carefully inspected for hemostasis. Pelvic lymph node dissection was performed removing all tatianna tissue between the external iliac vein, the pelvic floor, inguinal ligament up to the bifurcation of the common iliac vessels on both sides. Vesicourethral anastomosis was then performed in a standard fashion using two 3-0 V-lock sutures, the tail ends of which had been tied together. The first suture was taken at the 5 o'clock position through the bladder neck and taken through the  posterior urethral plate and up the left side of the urethra and bladder to the 11 o'clock position. We then brought the right sided anastomotic suture through the urethra and up the right side of the urethra and bladder to the midline. The sutures were then tied down. A final Camacho catheter was then placed and the bladder irrigated.  The anastomosis was tested with saline and found it to be leak free. An EndoCatch bag was used to retrieve the specimen and a 19 Jeramy drain was placed in the pelvis. Robot was undocked and ports removed. Midline camera port incision was enlarged in a transverse fashion and the specimen retrieved. The fascia was reapproximated using interrupted figure-of-eight 0 Vicryl sutures on UR-6 needle. The skin of all the incisions was closed using running subcuticular closure with 4-0 Monocryl. Skin glue was placed.      The patient tolerated the procedure well. There were no complications. Blood loss was 500 mL. All sponge, needle and instrument counts were correct and doubly verified prior to closure. I was present and involved in the entire procedure.      Akil Pink MD  Urology  [facility]

## 2022-02-05 NOTE — PROGRESS NOTES
"Urology  Progress Note    NAEON  Pain controlled  Tolerating diet w/o n/v  No gas yet  Walking    Exam  BP (!) 152/85 (BP Location: Left arm)   Pulse 78   Temp 99.1  F (37.3  C) (Oral)   Resp 18   Ht 1.753 m (5' 9\")   Wt 78.9 kg (174 lb)   SpO2 92%   BMI 25.70 kg/m    No acute distress  Unlabored breathing  Abdomen soft, nontender, nondistended. Incisions c/d/i  KAYLA serosanguinous  Camacho with light watermelon urine in tubing    UOP 1250/1300  KAYLA 170/75    Labs  WBC 16  Hgb 14.1  Cr 1.14  KAYLA Cr pending    Assessment/Plan  71 year old male POD#1 s/p RALP/BPLND    Neuro: Scheduled tylenol, PRN oxy/dilaudid for pain control  CV: HDS  Pulm: incentive spirometry while awake  FEN/GI: Advance diet, MIVF @ 100/hr, bowel reg  Endo: AGUSTÍN  : Check KAYLA Cr  Heme/ID: Periop abx complete  Activity: Up ad baljinder  PPx: SCDs  Dispo: Tenative discharge to home later today    Seen and examined with Dr. Pardeep Mayen MD  Urology Resident      "

## 2022-02-05 NOTE — PROGRESS NOTES
VSS ex elevated BP. A/Ox4. Independent.  KAYLA removed. Camacho patent, watermelon pink color and clear. PIV removed. Pt tolerating regular diet. Lap sites are approximated w/ liquid bandage w/ no drainage. Camacho education provided. Wife will provide transport home.

## 2022-02-05 NOTE — PLAN OF CARE
A&O x4, SBA, tolerating full liquid diet, reported mild pain that was relieved with rest, refuses to take scheduled tylenol stating that he does not have pain, nava is intact with good output, KAYLA drain is intact with bloody output. Discharge pending

## 2022-02-05 NOTE — PROGRESS NOTES
VSS, ex elevated BP. On RA. A/Ox4. Sleeping between cares. Denies pain medication. Lap sites approximated w/ liquid bandage and PEGGY, no drainage. KAYLA drain intact and output is bloody red, stripped thrice. Camacho is patent, output is red. PIV infusing NS @ 100ml/hr. Pt is very pleasant. Continue to monitor.

## 2022-02-05 NOTE — DISCHARGE INSTRUCTIONS
Activity: Ambulate often daily. No straining for bowel movements for 4 weeks. Avoid heavy lifting, nothing greater than 10 pounds (a gallon of milk) for the next 4 weeks. Ask your doctor when you can resume normal activity.          Driving: Your doctor will tell you when you can begin to drive. Make sure not to drive if you are taking take narcotics.       Wound Care:  Leave your incision open to air unless otherwise instructed. If you have wound staples they should be removed 1- 2 weeks after surgery. This will be done at your post-operative visit unless other arrangements have been made. If you do not have wound staples, then you have internal stitches that will dissolve with time. Over these may be white strips called  steri-strips  these should remain in place until they have mostly peeled off, after which they can be removed.     Camacho Catheter: You will go home with a Camacho and will be taught how to care for this  before you are discharged. You will be taught how to use a leg bag. The leg bag makes  it easier to walk around and is very useful when you are going out of the house. Empty the leg bag when it is half full. You may shower if you have a Camacho.     Drain:  If you have a KAYLA drain, empty and record the drain output daily and bring this information to your doctor s appointment. Do not bath if you have a drain in place, but you may shower. Your drain may be in place for 3-7 days. Your doctor will decide when it will be removed.     Shower: You may shower when you get home from the hospital. You may remove band aids if you have them but if you have steri-strips then leave them in place. If you have a drain do not let the water hit the drain site. PAT dry all incisions, DO NOT RUB.               Infection: Report the following warning signs of infection to your doctor immediately:                A temperature of 101.0 degrees Fahrenheit or greater                Redness, swelling, or drainage of the  incision site                Sudden onset of tenderness or warmth around the incision                Foul odor from the incision site    Pain: It is normal for you to have minor discomfort after surgery. If there is any  significant change in your condition call your doctor.    Bowel Function: You may experience constipation after surgery. Do not use suppositories or enemas. Eat a well balanced diet and drink plenty of fluids. You may take the stool softener, Colace (100 mg twice a day) for 2-3 weeks or until you stop taking narcotic pain medications. You may stop this medication if you have diarrhea. This can be purchased at your drug store. Unless you have poor renal function and high potassium, eating prunes, drinking prune juice, or using a laxative (such as Milk of Magnesia) is acceptable to help you return to normal bowel function.    Dehydration: Drink 6-8 glasses of water a day to avoid dehydration. You will know you are dehydrated if you feel thirsty or urinate infrequently. Your urine will also appear dark.    Miscellaneous:                 If you experience shortness of breath, difficulty breathing, or pain in your legs- please go to the nearest Emergency Department.                You may experience pink colored urine.                 If you experience persistent abdominal cramping, bloating, nausea, vomiting, or constipation, this may indicate a bowel obstruction or ileus. If the symptoms are mild, you may restrict dietary intake to only liquids and avoid solid food. If the symptoms are severe or persist beyond 24 hours, you must call your doctor for advice.     Follow up: Follow-up is routinely in 7-14 days for nava removal.

## 2022-02-05 NOTE — ANESTHESIA CARE TRANSFER NOTE
Patient: Andre Alas    Procedure: Procedure(s):  ROBOTIC ASSISTED LAPAROSCOPIC RADICAL PROSTATECTOMY BILATERAL PELVIC LYMPHADENECTOMY       Diagnosis: Prostate cancer (H) [C61]  Diagnosis Additional Information: No value filed.    Anesthesia Type:   General     Note:    Oropharynx: oral airway in place  Level of Consciousness: drowsy  Oxygen Supplementation: face mask    Independent Airway: airway patency satisfactory and stable  Dentition: dentition unchanged  Vital Signs Stable: post-procedure vital signs reviewed and stable  Report to RN Given: handoff report given  Patient transferred to: PACU    Handoff Report: Identifed the Patient, Identified the Reponsible Provider, Reviewed the pertinent medical history, Discussed the surgical course, Reviewed Intra-OP anesthesia mangement and issues during anesthesia, Set expectations for post-procedure period and Allowed opportunity for questions and acknowledgement of understanding      Vitals:  Vitals Value Taken Time   BP     Temp     Pulse     Resp     SpO2         Electronically Signed By: SYLVESTER Menezes CRNA  February 4, 2022  6:04 PM

## 2022-02-10 LAB
PATH REPORT.COMMENTS IMP SPEC: NORMAL
PATH REPORT.FINAL DX SPEC: NORMAL
PATH REPORT.GROSS SPEC: NORMAL
PATH REPORT.MICROSCOPIC SPEC OTHER STN: NORMAL
PATH REPORT.RELEVANT HX SPEC: NORMAL
PATHOLOGY SYNOPTIC REPORT: NORMAL
PHOTO IMAGE: NORMAL

## 2022-02-10 PROCEDURE — 88309 TISSUE EXAM BY PATHOLOGIST: CPT | Mod: 26 | Performed by: PATHOLOGY

## 2022-02-10 PROCEDURE — 88307 TISSUE EXAM BY PATHOLOGIST: CPT | Mod: 26 | Performed by: PATHOLOGY

## 2022-02-14 ENCOUNTER — OFFICE VISIT (OUTPATIENT)
Dept: UROLOGY | Facility: CLINIC | Age: 72
End: 2022-02-14
Payer: COMMERCIAL

## 2022-02-14 VITALS
WEIGHT: 172 LBS | DIASTOLIC BLOOD PRESSURE: 60 MMHG | OXYGEN SATURATION: 97 % | HEART RATE: 61 BPM | BODY MASS INDEX: 25.48 KG/M2 | SYSTOLIC BLOOD PRESSURE: 110 MMHG | HEIGHT: 69 IN

## 2022-02-14 DIAGNOSIS — C61 PROSTATE CANCER (H): Primary | ICD-10-CM

## 2022-02-14 PROCEDURE — 99024 POSTOP FOLLOW-UP VISIT: CPT | Performed by: PHYSICIAN ASSISTANT

## 2022-02-14 ASSESSMENT — PAIN SCALES - GENERAL: PAINLEVEL: MODERATE PAIN (4)

## 2022-02-14 ASSESSMENT — MIFFLIN-ST. JEOR: SCORE: 1530.57

## 2022-02-14 NOTE — LETTER
2/14/2022       RE: Andre Alas  [address] [facility] [address]     Dear Colleague,    Thank you for referring your patient, Andre Alas, to the [facility] [place] [facility]. Please see a copy of my visit note below.    would you like to obtain your AVS? Mail a copy    Chief Complaint  Cath removal     HPI  Andre Alas is a very pleasant 71 year old male who presents with a history of CVA 2014, NSTEMI, Elevated PSA (Prostate Specific Antigen) and BPH w/ h/o HOLEP in 2018 (tx of median lobe only given ball-valve visualized and aim was to treat retention rather than a more radical approach in case he needed RALP in the future), h/o gl 3+3 PCA diagnosed 2018 via 12-core bx by Dr. Guadarrama (4 cores on right, 1 on left, tissue involved = 5%). PSA was 10 at the time of HOLEP, and subsequently otilia earlier this year to 14.6.      When last seen by Dr. Oliveros in 2018 s/p HOLEP, his PVR was 419 and he was not catheterizing. Had even higher PVR at last visit with Dr. Pink, and started on tamsulosin, which he reports has improved symptoms.     Struggles with erection maintenance, not bothered by this and not interested in treatment.    Now s/p TRUS with [place] 3+4 prostate cancer. No complications since biopsy. Underwent robotic-assisted laparoscopic radical prostatectomy with bilateral pelvic lymphadenectomy 2/4/22 with Dr. Pink. Margins and nodes clear.     TRUS 11/11/2021  Final Diagnosis   A. Prostate, left base, biopsy-  Adenocarcinoma, Overbrook's grade 3/3 in 1 of 2 needle core biopsies and 10% of submitted tissue    B. Prostate, left mid, biopsy-  Benign prostate tissue    C.  Prostate, left apex, biopsy-  Adenocarcinoma, Aleksandar's grade 3/4 in 2 of 2 needle core biopsies and 10% of submitted tissue    D.  Prostate, right base, biopsy-  Benign prostate tissue    E.  Prostate, right mid, biopsy-  Benign prostate tissue    F.   Prostate, right apex, biopsy  Adenocarcinoma, Aleksandar's grade 3/4 in 2 of 2 needle core biopsies and 15% of submitted tissue    G.  Prostate, not otherwise specified, target #1, biopsy-  Adenocarcinoma, Toston's grade 3/4 in 2 of 2 needle core biopsies and 15% of submitted tissue    H.  Prostate, not otherwise specified, target #2, biopsy-  Atypical small gland proliferation, suspicious for adenocarcinoma      Surgical Path 2/4/22  A.  Radical prostatectomy:  (SEE SYNOPTIC DIAGNOSIS)  - Prostatic adenocarcinoma, small acinar cell type:    - WHO Grade Group 2 (see comment)       - Aleksandar score 7 , patterns 3+4 (<1% of pattern 4, preponderant pattern 3 tumor)    - Proportion of prostatic tissue involved by tumor:  5%    - Size of dominant nodule:  7.1 x 6.6 x 4.6 cm    - Intraductal carcinoma: None identified    - Perineural invasion: PRESENT (isolated, block A4)    - Extraprostatic extension: None identified    - Seminal vesicle and vas involvement: None identified    - Lymphovascular invasion: None identified    - Margins of resection: Uninvolved    - Stage:  pT2 pN0    B.  Right and left pelvic regional lymph nodes:  - Negative for malignancy in each of 5 lymph nodes identified.    C.  Right bladder neck margin:  - Negative for malignancy.      PSA  10/12/2021 - 15.10  8/3/2021 - 14.60  12/18/2018 - 9.96     MRI Prostate 10/12/2021  IMPRESSION:  1. Based on the most suspicious abnormality, this exam is  characterized as PIRADS 4 - Clinically significant cancer is likely to  be present.  The most suspicious abnormality is located at the Right  apex peripheral zone at the 7 o'clock and there is minimal capsular  abutment with no convincing evidence of extraprostatic extension. The  diffusion restriction in this region is new from comparison exam.  2. PIRADS 3 area within the left apex peripheral zone at the 5 o'clock  position.  3. No suspicious adenopathy or evidence of pelvic metastases.    I have reviewed and  updated the patient's Past Medical History, Social History, Family History and Medication List.      ALLERGIES  Dust mites and Mold    Vitals:  No vitals were obtained today due to virtual visit.    Physical Exam   : Clear, balloon deflated and removed intact.     Outside and Past Medical records:  Review of the result(s) of each unique test - PSA, MRI, pathology    ASSESSMENT and PLAN  Andre Alas is a 71 with previous [place] 3+3 prostate cancer since 2018 on active surveillance, but now with [place] 3+4=7 disease on repeat biopsy, with rising PSA. History of BPH with partial HoLEP in 2018 with resection of median lobe. MRI with PIRADS 4 lesion.   S/p RALP    -Pelvic floor PT referral   -PSA in 3 months.     28 total minutes spent on the date of the encounter including direct interaction with the patient, performing chart review, documentation and further activities as noted above.    Again, thank you for allowing me to participate in the care of your patient.      Sincerely,    Dee Martínez PA-C, K[facility]ITA

## 2022-02-14 NOTE — PATIENT INSTRUCTIONS
Please see one of the dedicated pelvic floor physical therapists ([facility] [phone]).    Please be aware that coverage of these services is subject to the terms and limitations of your health insurance plan.  Call member services at your health plan with any benefit or coverage questions.      Please bring the following to your appointment:    *Your personal calendar for scheduling future appointments  *Comfortable clothing         Please call [phone] to schedule with Dr Pink at the .

## 2022-03-21 ENCOUNTER — THERAPY VISIT (OUTPATIENT)
Dept: PHYSICAL THERAPY | Facility: CLINIC | Age: 72
End: 2022-03-21
Attending: PHYSICIAN ASSISTANT
Payer: COMMERCIAL

## 2022-03-21 DIAGNOSIS — R32 URINARY INCONTINENCE: ICD-10-CM

## 2022-03-21 DIAGNOSIS — C61 PROSTATE CANCER (H): ICD-10-CM

## 2022-03-21 PROCEDURE — 97110 THERAPEUTIC EXERCISES: CPT | Mod: GP | Performed by: PHYSICAL THERAPIST

## 2022-03-21 PROCEDURE — 97161 PT EVAL LOW COMPLEX 20 MIN: CPT | Mod: GP | Performed by: PHYSICAL THERAPIST

## 2022-03-21 PROCEDURE — 97530 THERAPEUTIC ACTIVITIES: CPT | Mod: GP | Performed by: PHYSICAL THERAPIST

## 2022-03-21 NOTE — PROGRESS NOTES
Physical Therapy Initial Evaluation  Subjective:  The history is provided by the patient. No  was used.   Patient Health History  Andre Alas being seen for bladder control, prostate surgery 2/4/22.     Date of Onset: 2/4/22 davinci prostatectomy.      Pain is reported as 0/10 on pain scale.  General health as reported by patient is excellent.  Health conditions: high blood pressure, cancer, incontinence, stroke, migraines/headaches, heart problems.   Red flags:  None as reported by patient.      Other surgery history details: prostate removal 2/4/22.    Current medications:  High blood pressure medication. Other medications details: blood thinner.    Current occupation is .   Primary job tasks include:  Computer work.                    Therapist Assessment:   Clinical Impression: Pt presents with primary complaint of urinary incontinence s/p prostatectomy 2/4/22.  Per clinical examination, pt with fair to good coordination of pelvic floor muscles.  Pt will benefit from skilled physical therapy for pelvic floor coordination and strength training.    Chief Complaint:  The pt presents today s/p davinci prostatectomy performed on 2/4/22. He has been doing some pelvic floor muscle contractions when urinating When he is horizontal he does not have any leaking. He is able to stop is stream of urine.The pt will get an urge to urinate and some leaking when he goes from sitting to standing. Sometimes he will leak without awareness when he is up standing/walking around. Standing has helped reduce the post void dribbling.    Current activity: sit ups, leg lifts, trunk twists, knee bends, cervical rotations, single leg squats, glute and hamstring stretches, cross legged standing up routine, 15 minutes on a bike    Goals: get rid of diapers    Urination   Do you leak on the way to the bathroom or with a strong urge to void? yes  Do you leak with cough, sneeze, jumping, running?  yes  Any other activities that cause leaking? lifting  Do you have triggers that make you feel you can't wait to go to the bathroom? A warm feeling lets me know it is too late   Type of pad and number used per day? 2-3 diapers  When you leak what is the amount? Small drops  How long can you delay the need to urinate? depends  How many times do you get up to urinate at night? 3x  How many times do you urinate during the day? 20-30x a day  Can you stop the flow of urine when on the toilet? yes  Is the volume of urine passed usually: small  Do you strain to pass urine? no  Do you have a slow or hesitant urinary stream? no  Do you have difficulty initiating the urine stream? no  How many bladder infections have you had in the last 12 months? 0  What is you fluid intake per day? Water (8oz) 80oz  Caffeine 0  Alcohol 0  Do you feel like you empty completely when voiding? No, because he will dribble after urinating    Bowel Habits  Frequency of bowel movements? 3x a week  Consistency of stool? Ross stool scale: type 3-4  Do you ignore the urge to defecate? no  Do you strain to pass stool? no  Do you feel that you empty completely? Yes  Sometimes he will have abdominal pain with passing gas in his abdomen.     Pelvic Pain  When do you have pelvic pain? No pain  Are you sexually active? no  Are you having regular exercise? yes  Have you practiced the PF (kegel) exercise for 4 or more weeks? yes    Objective:    POSTURE: forward head    EXTERNAL ASSESSMENT:  Skin condition:normal  Bearing down/coughing:NT  Muscle contraction/perineal mobility: elevation and urogenital triangle descent    SEMG BIOFEEDBACK  Surface Electrode Placement:   Perianal: Levator ani     Baseline EMG PM: resting tone seated: 2.0uV  Endurance: 10 seconds, co-activation of gltues, abdominals and breath holding  Quick flicks: 10/10      Assessment/Plan:    Patient is a 71 year old male with pelvic complaints.    Patient has the following significant  findings with corresponding treatment plan.                Diagnosis 1:  Pelvic floor weakness  Pain -  hot/cold therapy, US, electric stimulation, mechanical traction, manual therapy, STS, splint/taping/bracing/orthotics, self management, education, directional preference exercise and home program  Impaired muscle performance - biofeedback, electric stimulation, neuro re-education and home program    Therapy Evaluation Codes:   Cumulative Therapy Evaluation is: Low complexity.    Previous and current functional limitations:  (See Goal Flow Sheet for this information)    Short term and Long term goals: (See Goal Flow Sheet for this information)     Communication ability:  Patient appears to be able to clearly communicate and understand verbal and written communication and follow directions correctly.  Treatment Explanation - The following has been discussed with the patient:   RX ordered/plan of care  Anticipated outcomes  Possible risks and side effects  This patient would benefit from PT intervention to resume normal activities.   Rehab potential is good.    Frequency:  1 X week, once daily  Duration:  for 4 weeks tapering to 2 X a month over 8 weeks  Discharge Plan:  Achieve all LTG.  Independent in home treatment program.  Reach maximal therapeutic benefit.    Please refer to the daily flowsheet for treatment today, total treatment time and time spent performing 1:1 timed codes.

## 2022-03-28 ENCOUNTER — THERAPY VISIT (OUTPATIENT)
Dept: PHYSICAL THERAPY | Facility: CLINIC | Age: 72
End: 2022-03-28
Payer: COMMERCIAL

## 2022-03-28 DIAGNOSIS — R32 URINARY INCONTINENCE: ICD-10-CM

## 2022-03-28 PROCEDURE — 97530 THERAPEUTIC ACTIVITIES: CPT | Mod: GP | Performed by: PHYSICAL THERAPIST

## 2022-03-28 PROCEDURE — 97112 NEUROMUSCULAR REEDUCATION: CPT | Mod: GP | Performed by: PHYSICAL THERAPIST

## 2022-04-11 ENCOUNTER — THERAPY VISIT (OUTPATIENT)
Dept: PHYSICAL THERAPY | Facility: CLINIC | Age: 72
End: 2022-04-11
Payer: COMMERCIAL

## 2022-04-11 DIAGNOSIS — R32 URINARY INCONTINENCE: Primary | ICD-10-CM

## 2022-04-11 PROCEDURE — 97530 THERAPEUTIC ACTIVITIES: CPT | Mod: GP | Performed by: PHYSICAL THERAPIST

## 2022-04-11 PROCEDURE — 97110 THERAPEUTIC EXERCISES: CPT | Mod: GP | Performed by: PHYSICAL THERAPIST

## 2022-04-14 ENCOUNTER — PRE VISIT (OUTPATIENT)
Dept: UROLOGY | Facility: CLINIC | Age: 72
End: 2022-04-14
Payer: COMMERCIAL

## 2022-04-14 NOTE — CONFIDENTIAL NOTE
Reason for visit: PSA recheck     Relevant information: Prostate cancer; elevated PSA; hx of BPH w/ partial HoLEP    Records/imaging/labs/orders: PSA on 5/17    Pt called: Call about PSA    At Rooming: Standard

## 2022-04-26 ENCOUNTER — THERAPY VISIT (OUTPATIENT)
Dept: PHYSICAL THERAPY | Facility: CLINIC | Age: 72
End: 2022-04-26
Payer: COMMERCIAL

## 2022-04-26 DIAGNOSIS — R32 URINARY INCONTINENCE: Primary | ICD-10-CM

## 2022-04-26 PROCEDURE — 97530 THERAPEUTIC ACTIVITIES: CPT | Mod: GP | Performed by: PHYSICAL THERAPIST

## 2022-04-26 PROCEDURE — 97110 THERAPEUTIC EXERCISES: CPT | Mod: GP | Performed by: PHYSICAL THERAPIST

## 2022-04-26 PROCEDURE — 97112 NEUROMUSCULAR REEDUCATION: CPT | Mod: GP | Performed by: PHYSICAL THERAPIST

## 2022-05-17 ENCOUNTER — TELEPHONE (OUTPATIENT)
Dept: ONCOLOGY | Facility: CLINIC | Age: 72
End: 2022-05-17

## 2022-05-17 ENCOUNTER — LAB (OUTPATIENT)
Dept: LAB | Facility: CLINIC | Age: 72
End: 2022-05-17

## 2022-05-17 ENCOUNTER — OFFICE VISIT (OUTPATIENT)
Dept: UROLOGY | Facility: CLINIC | Age: 72
End: 2022-05-17
Payer: COMMERCIAL

## 2022-05-17 VITALS — SYSTOLIC BLOOD PRESSURE: 125 MMHG | DIASTOLIC BLOOD PRESSURE: 83 MMHG

## 2022-05-17 DIAGNOSIS — C61 PROSTATE CANCER (H): ICD-10-CM

## 2022-05-17 DIAGNOSIS — C61 PROSTATE CANCER (H): Primary | ICD-10-CM

## 2022-05-17 DIAGNOSIS — N52.31 ERECTILE DYSFUNCTION AFTER RADICAL PROSTATECTOMY: ICD-10-CM

## 2022-05-17 LAB — PSA SERPL-MCNC: <0.01 UG/L (ref 0–4)

## 2022-05-17 PROCEDURE — 99214 OFFICE O/P EST MOD 30 MIN: CPT | Performed by: UROLOGY

## 2022-05-17 PROCEDURE — 36415 COLL VENOUS BLD VENIPUNCTURE: CPT | Performed by: PATHOLOGY

## 2022-05-17 PROCEDURE — 84153 ASSAY OF PSA TOTAL: CPT | Performed by: PATHOLOGY

## 2022-05-17 RX ORDER — SILDENAFIL CITRATE 20 MG/1
20 TABLET ORAL DAILY PRN
Qty: 30 TABLET | Refills: 11 | Status: SHIPPED | OUTPATIENT
Start: 2022-05-17

## 2022-05-17 ASSESSMENT — PAIN SCALES - GENERAL: PAINLEVEL: NO PAIN (0)

## 2022-05-17 NOTE — PATIENT INSTRUCTIONS
Please follow up in three months with a PSA beforehand.     It was a pleasure meeting with you today.  Thank you for allowing me and my team the privilege of caring for you today.  YOU are the reason we are here, and I truly hope we provided you with the excellent service you deserve.  Please let us know if there is anything else we can do for you so that we can be sure you are leaving completely satisfied with your care experience.

## 2022-05-17 NOTE — TELEPHONE ENCOUNTER
Lab called writer due to no PSA order. Writer saw last office visit documentation. Writer placed order.

## 2022-05-17 NOTE — PROGRESS NOTES
CHIEF COMPLAINT   It was my pleasure to see Andre Alas who is a 71 year old male for follow-up of Prostate Cancer.      HPI   Andre Alas is a very pleasant 71 year old male who presents with a history of Prostate Cancer. RALP 2/4/2022. History was significant for HoLEP prior to prostatectomy. Had Aleksandar 3+4=7 disease. PSA undetectable.  Noting good progress with regard to continence. Still with some leakage, but noting progressive improvement. Minimal erectile function to date.     PSA  5/17/2022 - <0.01    Surgical Path 2/4/22  A.  Radical prostatectomy:  (SEE SYNOPTIC DIAGNOSIS)  - Prostatic adenocarcinoma, small acinar cell type:    - WHO Grade Group 2 (see comment)       - Aleksandar score 7 , patterns 3+4 (<1% of pattern 4, preponderant pattern 3 tumor)    - Proportion of prostatic tissue involved by tumor:  5%    - Size of dominant nodule:  7.1 x 6.6 x 4.6 cm    - Intraductal carcinoma: None identified    - Perineural invasion: PRESENT (isolated, block A4)    - Extraprostatic extension: None identified    - Seminal vesicle and vas involvement: None identified    - Lymphovascular invasion: None identified    - Margins of resection: Uninvolved    - Stage:  pT2 pN0    B.  Right and left pelvic regional lymph nodes:  - Negative for malignancy in each of 5 lymph nodes identified.    C.  Right bladder neck margin:  - Negative for malignancy.     PHYSICAL EXAM  Patient is a 71 year old  male   Vitals: Blood pressure 125/83.  General Appearance Adult: There is no height or weight on file to calculate BMI.  Alert, no acute distress, oriented  Lungs: no respiratory distress, or pursed lip breathing  Abdomen: soft, nontender, no organomegaly or masses  Back: no CVAT  Neuro: Alert, oriented, speech and mentation normal  Psych: affect and mood normal    Outside and Past Medical records:  Review of the result(s) of each unique test - PSA, pathology    ASSESSMENT and PLAN  71 year old male with stage pT2N0, Aleksandar  3+4=7 prostate cancer, s/p RALP completed 2/4/2022. Negative margins.e discussed his pathology in detail today. We discussed the importance of ongoing PSA surveillance and risk of recurrence.   Noting progressive improvement with continence.  We discussed erectile dysfunction in detail today and expectations post-prostatectomy. We discussed the role for sildenafil and the risks and potential side effects of this. Prescription provided today.    - Sildenafil script   - Follow-up 3 months with PSA    30 minutes spent on the date of the encounter doing chart review, history and exam, documentation and further activities as noted above.    Akil Pink MD  Urology  [facility]

## 2022-05-17 NOTE — NURSING NOTE
No chief complaint on file.      Blood pressure 125/83. There is no height or weight on file to calculate BMI.    Patient Active Problem List   Diagnosis     Hyperlipidemia LDL goal <70     Allergic rhinitis due to dust     Paroxysmal atrial fibrillation (H)     History of CVA (cerebrovascular accident)     Advanced directives, counseling/discussion     Prostate cancer (H)     History of colonic polyps     Hypertension goal BP (blood pressure) < 140/90     BPH with obstruction/lower urinary tract symptoms     Atrioventricular canal (AVC), incomplete     Memory problem     Urinary incontinence       Allergies   Allergen Reactions     Dust Mites      Mold        Current Outpatient Medications   Medication Sig Dispense Refill     Ascorbic Acid (VITAMIN C PO) Take 1 tablet by mouth twice a week        B Complex Vitamins (VITAMIN B COMPLEX PO) Take 1 tablet by mouth twice a week        lisinopril (ZESTRIL) 5 MG tablet Take 5 mg by mouth 2 times daily        oxyCODONE (ROXICODONE) 5 MG tablet Take 1 tablet (5 mg) by mouth every 6 hours as needed for pain (Patient not taking: Reported on 2/14/2022) 9 tablet 0     rivaroxaban ANTICOAGULANT (XARELTO) 10 MG TABS tablet Take 15 mg by mouth daily (with dinner)       senna-docusate (SENOKOT-S/PERICOLACE) 8.6-50 MG tablet Take 1 tablet by mouth 2 times daily as needed for constipation (Patient not taking: Reported on 2/14/2022) 20 tablet 0     vitamin E (TOCOPHEROL) 100 UNIT capsule Take 100 Units by mouth twice a week  (Patient not taking: Reported on 2/14/2022)       Zinc Gluconate (ZINC) 100 MG TABS Take 1 tablet by mouth twice a week          Social History     Tobacco Use     Smoking status: Never Smoker     Smokeless tobacco: Never Used   Substance Use Topics     Alcohol use: Not Currently     Alcohol/week: 0.0 standard drinks     Drug use: No       Harris Maddox  5/17/2022  3:13 PM

## 2022-05-18 ENCOUNTER — TELEPHONE (OUTPATIENT)
Dept: ONCOLOGY | Facility: CLINIC | Age: 72
End: 2022-05-18
Payer: COMMERCIAL

## 2022-05-24 ENCOUNTER — THERAPY VISIT (OUTPATIENT)
Dept: PHYSICAL THERAPY | Facility: CLINIC | Age: 72
End: 2022-05-24
Payer: COMMERCIAL

## 2022-05-24 DIAGNOSIS — R32 URINARY INCONTINENCE: Primary | ICD-10-CM

## 2022-05-24 PROCEDURE — 97110 THERAPEUTIC EXERCISES: CPT | Mod: GP | Performed by: PHYSICAL THERAPIST

## 2022-05-24 PROCEDURE — 97530 THERAPEUTIC ACTIVITIES: CPT | Mod: GP | Performed by: PHYSICAL THERAPIST

## 2022-05-24 NOTE — TELEPHONE ENCOUNTER
Central Prior Authorization Team   Phone: [phone]      PRIOR AUTHORIZATION DENIED    Medication: sildenafil (REVATIO) 20 MG tablet - EPA DENIED    Denial Date: 5/18/2022    Denial Rational:     [employer]  received a request for coverage of sildenafil tablets for you. This is the  initial adverse determination for this request. The request was denied because:    Current plan approved criteria does not allow coverage of the requested medication  unless the patient has one of the following diagnoses: pulmonary arterial hypertension  (PAH) or secondary Raynaud's phenomenon. Additional coverage criteria may apply,  please review policy or plan documents for full requirements.      Appeal Information: If the Provider/Patient would like to appeal this denial, please provide a letter of medical necessity explaining why Preferred Formulary medications are not appropriate in the treatment of the patient's condition; along with any previous therapies tried/failed.    Once completed, please route back to me directly: Santiago Staley

## 2022-06-14 ENCOUNTER — THERAPY VISIT (OUTPATIENT)
Dept: PHYSICAL THERAPY | Facility: CLINIC | Age: 72
End: 2022-06-14
Payer: COMMERCIAL

## 2022-06-14 DIAGNOSIS — R32 URINARY INCONTINENCE: Primary | ICD-10-CM

## 2022-06-14 PROCEDURE — 97530 THERAPEUTIC ACTIVITIES: CPT | Mod: GP | Performed by: PHYSICAL THERAPIST

## 2022-06-14 PROCEDURE — 97110 THERAPEUTIC EXERCISES: CPT | Mod: GP | Performed by: PHYSICAL THERAPIST

## 2022-07-12 ENCOUNTER — THERAPY VISIT (OUTPATIENT)
Dept: PHYSICAL THERAPY | Facility: CLINIC | Age: 72
End: 2022-07-12
Payer: COMMERCIAL

## 2022-07-12 DIAGNOSIS — R32 URINARY INCONTINENCE: Primary | ICD-10-CM

## 2022-07-12 PROCEDURE — 97530 THERAPEUTIC ACTIVITIES: CPT | Mod: GP | Performed by: PHYSICAL THERAPIST

## 2022-08-25 ENCOUNTER — THERAPY VISIT (OUTPATIENT)
Dept: PHYSICAL THERAPY | Facility: CLINIC | Age: 72
End: 2022-08-25
Payer: COMMERCIAL

## 2022-08-25 DIAGNOSIS — N81.89 PELVIC FLOOR WEAKNESS: Primary | ICD-10-CM

## 2022-08-25 PROCEDURE — 97530 THERAPEUTIC ACTIVITIES: CPT | Mod: GP | Performed by: PHYSICAL THERAPIST

## 2022-08-25 PROCEDURE — 97110 THERAPEUTIC EXERCISES: CPT | Mod: GP | Performed by: PHYSICAL THERAPIST

## 2022-09-07 ENCOUNTER — PRE VISIT (OUTPATIENT)
Dept: UROLOGY | Facility: CLINIC | Age: 72
End: 2022-09-07

## 2022-09-07 NOTE — TELEPHONE ENCOUNTER
Reason for visit: PSA recheck     Relevant information: Prostate cancer s/p RALP 2/2022    Records/imaging/labs/orders: PSA on 9/12    Pt called: N/A    At Rooming: Standard

## 2022-09-09 ENCOUNTER — TELEPHONE (OUTPATIENT)
Dept: FAMILY MEDICINE | Facility: CLINIC | Age: 72
End: 2022-09-09

## 2022-09-09 NOTE — TELEPHONE ENCOUNTER
RN received call from patient.    Patient needs PSA drawn prior to 9/20 appointment.    Patient asking how much time it takes to get results back.    RN advised it could take several days.    RN assisted patient in scheduling lab only appointment.    Patel Hilliard RN, BSN, PHN  [facility]

## 2022-09-12 ENCOUNTER — LAB (OUTPATIENT)
Dept: LAB | Facility: CLINIC | Age: 72
End: 2022-09-12
Payer: COMMERCIAL

## 2022-09-12 DIAGNOSIS — C61 PROSTATE CANCER (H): ICD-10-CM

## 2022-09-12 PROCEDURE — 36415 COLL VENOUS BLD VENIPUNCTURE: CPT

## 2022-09-12 PROCEDURE — 84153 ASSAY OF PSA TOTAL: CPT

## 2022-09-13 LAB — PSA SERPL-MCNC: <0.01 UG/L (ref 0–4)

## 2022-09-20 ENCOUNTER — THERAPY VISIT (OUTPATIENT)
Dept: PHYSICAL THERAPY | Facility: CLINIC | Age: 72
End: 2022-09-20
Payer: COMMERCIAL

## 2022-09-20 ENCOUNTER — OFFICE VISIT (OUTPATIENT)
Dept: UROLOGY | Facility: CLINIC | Age: 72
End: 2022-09-20
Payer: COMMERCIAL

## 2022-09-20 VITALS
BODY MASS INDEX: 24.44 KG/M2 | HEART RATE: 67 BPM | DIASTOLIC BLOOD PRESSURE: 89 MMHG | WEIGHT: 165 LBS | SYSTOLIC BLOOD PRESSURE: 134 MMHG | HEIGHT: 69 IN

## 2022-09-20 DIAGNOSIS — R32 URINARY INCONTINENCE: Primary | ICD-10-CM

## 2022-09-20 DIAGNOSIS — C61 PROSTATE CANCER (H): Primary | ICD-10-CM

## 2022-09-20 PROCEDURE — 97530 THERAPEUTIC ACTIVITIES: CPT | Mod: GP | Performed by: PHYSICAL THERAPIST

## 2022-09-20 PROCEDURE — 99213 OFFICE O/P EST LOW 20 MIN: CPT | Performed by: UROLOGY

## 2022-09-20 ASSESSMENT — PAIN SCALES - GENERAL: PAINLEVEL: NO PAIN (0)

## 2022-09-20 NOTE — NURSING NOTE
"Chief Complaint   Patient presents with     Follow Up     PSA recheck       Blood pressure 134/89, pulse 67, height 1.753 m (5' 9\"), weight 74.8 kg (165 lb). Body mass index is 24.37 kg/m .    Patient Active Problem List   Diagnosis     Hyperlipidemia LDL goal <70     Allergic rhinitis due to dust     Paroxysmal atrial fibrillation (H)     History of CVA (cerebrovascular accident)     Advanced directives, counseling/discussion     Prostate cancer (H)     History of colonic polyps     Hypertension goal BP (blood pressure) < 140/90     BPH with obstruction/lower urinary tract symptoms     Atrioventricular canal (AVC), incomplete     Memory problem     Urinary incontinence       Allergies   Allergen Reactions     Dust Mites      Mold        Current Outpatient Medications   Medication Sig Dispense Refill     Ascorbic Acid (VITAMIN C PO) Take 1 tablet by mouth twice a week        B Complex Vitamins (VITAMIN B COMPLEX PO) Take 1 tablet by mouth twice a week        lisinopril (ZESTRIL) 5 MG tablet Take 5 mg by mouth 2 times daily        rivaroxaban ANTICOAGULANT (XARELTO) 10 MG TABS tablet Take 15 mg by mouth daily (with dinner)       vitamin E (TOCOPHEROL) 100 UNIT capsule Take 100 Units by mouth twice a week       Zinc Gluconate 100 MG TABS Take 1 tablet by mouth twice a week        sildenafil (REVATIO) 20 MG tablet Take 1 tablet (20 mg) by mouth daily as needed (sexual activity) Take 1-5 tabs, 30-60 minutes prior to sexual activity. (Patient not taking: Reported on 9/20/2022) 30 tablet 11       Social History     Tobacco Use     Smoking status: Never Smoker     Smokeless tobacco: Never Used   Substance Use Topics     Alcohol use: Not Currently     Alcohol/week: 0.0 standard drinks     Drug use: No       Judith Angulo, LUIGI  9/20/2022  3:06 PM  "

## 2022-09-20 NOTE — PROGRESS NOTES
"  CHIEF COMPLAINT   It was my pleasure to see Andre Alas who is a 72 year old male for follow-up of Prostate Cancer.      HPI  Andre Alas is a very pleasant 72 year old male who presents with a history of Prostate Cancer. RALP 2/4/2022. History was significant for HoLEP prior to prostatectomy. Had Aleksandar 3+4=7 disease. PSA undetectable.  Noting good progress with regard to continence. Still with some leakage, but noting progressive improvement. Minimal erectile function to date, despite sildenafil.     PSA  9/12/2022 - <0.01  5/17/2022 - <0.01     Surgical Path 2/4/22  A.  Radical prostatectomy:  (SEE SYNOPTIC DIAGNOSIS)  - Prostatic adenocarcinoma, small acinar cell type:    - WHO Grade Group 2 (see comment)       - Brimson score 7 , patterns 3+4 (<1% of pattern 4, preponderant pattern 3 tumor)    - Proportion of prostatic tissue involved by tumor:  5%    - Size of dominant nodule:  7.1 x 6.6 x 4.6 cm    - Intraductal carcinoma: None identified    - Perineural invasion: PRESENT (isolated, block A4)    - Extraprostatic extension: None identified    - Seminal vesicle and vas involvement: None identified    - Lymphovascular invasion: None identified    - Margins of resection: Uninvolved    - Stage:  pT2 pN0    B.  Right and left pelvic regional lymph nodes:  - Negative for malignancy in each of 5 lymph nodes identified.    C.  Right bladder neck margin:  - Negative for malignancy.    PHYSICAL EXAM  Patient is a 72 year old  male   Vitals: Blood pressure 134/89, pulse 67, height 1.753 m (5' 9\"), weight 74.8 kg (165 lb).  General Appearance Adult: Body mass index is 24.37 kg/m .  Alert, no acute distress, oriented  Lungs: no respiratory distress, or pursed lip breathing  Abdomen: soft, nontender, no organomegaly or masses  Back: no CVAT  Neuro: Alert, oriented, speech and mentation normal  Psych: affect and mood normal    Outside and Past Medical records:  Review of the result(s) of each unique test - " PSA     ASSESSMENT and PLAN  72 year old male with stage pT2N0, Aleksandar 3+4=7 prostate cancer, s/p RALP completed 2/4/2022. Negative margins.   Noting progressive improvement with continence.  Is having minimal return of erectile function despite sildenafil and is interested in exploring options, including ICI, and IPP. He would like referral to Dr. Ellis to discuss further     - Consult with Dr. Ellis to discuss  - Follow-up 3 months with PSA     15 minutes spent on the date of the encounter doing chart review, history and exam, documentation and further activities as noted above.    Akil Pink MD  Urology  [facility]

## 2022-09-20 NOTE — PATIENT INSTRUCTIONS
Please follow up in three months with a PSA before hand, and follow up with Dr. Caleb winslow [facility].    It was a pleasure meeting with you today.  Thank you for allowing me and my team the privilege of caring for you today.  YOU are the reason we are here, and I truly hope we provided you with the excellent service you deserve.  Please let us know if there is anything else we can do for you so that we can be sure you are leaving completely satisfied with your care experience.

## 2022-09-20 NOTE — PROGRESS NOTES
DISCHARGE REPORT      SUBJECTIVE   Subjective: He is consistent with his exercises. He notes that he only has leaking when he is really tired and he has a sudden cough or quick movement.    Current pain level is 0/10  .      Initial Pain level: 0/10.   Changes in function:  Yes (See Goal flowsheet attached for changes in current functional level)  Adverse reaction to treatment or activity: None    OBJECTIVE  Changes noted in objective findings:  Yes,   Objective: pad usage about 1 over a 2-3 days period.   AUA:2     ASSESSMENT/PLAN  Updated problem list and treatment plan: Diagnosis 1:  Pelvic floor weakness  Impaired muscle performance - biofeedback, electric stimulation, neuro re-education and home program  Decreased function - therapeutic activities, home program and functional performance testing  STG/LTGs have been met or progress has been made towards goals:  Yes (See Goal flow sheet completed today.)  Assessment of Progress: The patient's condition is improving.  The patient's condition has potential to improve.  Self Management Plans:  Patient is independent in a home treatment program.  Patient is independent in self management of symptoms.  I have re-evaluated this patient and find that the nature, scope, duration and intensity of the therapy is appropriate for the medical condition of the patient.  Andre continues to require the following intervention to meet STG and LTG's:  PT intervention is no longer required to meet STG/LTG.    Recommendations:  This patient is ready to be discharged from therapy and continue their home treatment program.    Please refer to the daily flowsheet for treatment today, total treatment time and time spent performing 1:1 timed codes.

## 2022-10-28 ENCOUNTER — PRE VISIT (OUTPATIENT)
Dept: UROLOGY | Facility: CLINIC | Age: 72
End: 2022-10-28

## 2022-10-28 NOTE — TELEPHONE ENCOUNTER
Reason for visit: Follow up     Relevant information: ED after RALP    Records/imaging/labs/orders: in EPIC    Pt called: no    At Rooming: normal

## 2022-11-17 ENCOUNTER — OFFICE VISIT (OUTPATIENT)
Dept: UROLOGY | Facility: CLINIC | Age: 72
End: 2022-11-17
Payer: COMMERCIAL

## 2022-11-17 VITALS
WEIGHT: 175 LBS | HEART RATE: 59 BPM | SYSTOLIC BLOOD PRESSURE: 138 MMHG | DIASTOLIC BLOOD PRESSURE: 90 MMHG | BODY MASS INDEX: 25.92 KG/M2 | HEIGHT: 69 IN

## 2022-11-17 DIAGNOSIS — N52.31 ERECTILE DYSFUNCTION AFTER RADICAL PROSTATECTOMY: ICD-10-CM

## 2022-11-17 DIAGNOSIS — C61 PROSTATE CANCER (H): Primary | ICD-10-CM

## 2022-11-17 PROCEDURE — 99213 OFFICE O/P EST LOW 20 MIN: CPT | Performed by: UROLOGY

## 2022-11-17 ASSESSMENT — PAIN SCALES - GENERAL: PAINLEVEL: NO PAIN (0)

## 2022-11-17 NOTE — PATIENT INSTRUCTIONS
Please schedule a nurse visit with Suzanne for injection teaching.    It was a pleasure meeting with you today.  Thank you for allowing me and my team the privilege of caring for you today.  YOU are the reason we are here, and I truly hope we provided you with the excellent service you deserve.  Please let us know if there is anything else we can do for you so that we can be sure you are leaving completely satisfied with your care experience.

## 2022-11-17 NOTE — LETTER
11/17/2022       RE: Andre Alas  [address]  [facility] [address]     Dear Colleague,    Thank you for referring your patient, Andre Alas, to the [facility] [place] at [facility]. Please see a copy of my visit note below.    I am seeing Andre Alas in consultation from Dr. Pink for evaluation of erectile dysfunction.    HPI:  Pt seen in consultation for erectile dysfunction.  History of the erectile dysfunction is as follows:  S/p RALP with Dr. Pink for 3+4=7 prostate cancer in 02/2022, bilateral nerve-sparing. Trialed sildenafil post-op without any response. Presurgery, fairly good erectile function, no prior use of oral PDE5i  medications.     ED/Vascular disease risk factors:  HTN: Yes  Hyperlipidemia: Yes  Smoking: No   DM: No  Cardiovasciular disease: Known  Meds associated with ED that he's taking: None  Anxiety/anger/depression: No  Penile Plaques or curvature: none.     Libido: present, though decreased with age     REVIEW OF SYSTEMS:  General: negative  Skin: negative  Eyes: negative  Ears/Nose/Throat: negative  Respiratory: negative  Cardiovascular: negative  Gastrointestinal: negative  Genitourinary: see HPI  Musculoskeletal: negative  Neurologic: negative  Psychiatric: negative  Hematologic/Lymphatic/Immunologic: negative  Endocrine: negative    PAST MEDICAL HX:  Past Medical History:   Diagnosis Date     A-fib (H)      Allergic rhinitis due to dust 11/29/2011     Atrioventricular canal (AVC), incomplete      BPH without urinary obstruction 11/29/2011     Complication of anesthesia      CVA (cerebral vascular accident) (H) 07/2014    embolic 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     Hernia, abdominal      History of thrombophlebitis      Hyperlipidemia LDL goal <70 11/28/2011     Hypertension goal BP (blood pressure) < 140/90      Mumps      NSTEMI (non-ST elevated myocardial infarction) (H) 7/2014    prob sec to PAF -- no sig  CAD     PAF (paroxysmal atrial fibrillation) (H) 7/2014     Palpitations      Prostate cancer (H) 02/2018       PAST SURG HX:  Past Surgical History:   Procedure Laterality Date     ANGIOGRAM  2014    normal - non ST MI     BLADDER SURGERY       COLONOSCOPY       CYSTOSCOPY       DAVINCI PROSTATECTOMY, LYMPHADENECTOMY N/A 2/4/2022    Procedure: ROBOTIC ASSISTED LAPAROSCOPIC RADICAL PROSTATECTOMY BILATERAL PELVIC LYMPHADENECTOMY;  Surgeon: Akil Pink MD;  Location: [facility] OR     DENTAL SURGERY  2000     HERNIA REPAIR, INGUINAL RT/LT  1952     LASER HOLMIUM ENUCLEATION PROSTATE N/A 9/27/2018    Procedure: LASER HOLMIUM ENUCLEATION PROSTATE;  Holmium Laser Enucleation Of The Prostate;  Surgeon: Henry Oliveros MD;  Location: [facility] OR     PROSTATE SURGERY          FAMILY HX:  Family History   Problem Relation Age of Onset     Heart Failure Mother 73     Cerebrovascular Disease Father 70     Heart Disease Father 91        Pacemaker     Kidney Disease Father         stones      No Known Problems Brother      No Known Problems Sister      Cancer - colorectal No family hx of      Prostate Cancer No family hx of      Diabetes No family hx of      Hypertension No family hx of        SOCIAL HX:  Social History     Tobacco Use     Smoking status: Never     Smokeless tobacco: Never   Substance Use Topics     Alcohol use: Not Currently     Alcohol/week: 0.0 standard drinks     Drug use: No       MEDICATIONS:  Current Outpatient Medications   Medication Sig     Ascorbic Acid (VITAMIN C PO) Take 1 tablet by mouth twice a week      B Complex Vitamins (VITAMIN B COMPLEX PO) Take 1 tablet by mouth twice a week      lisinopril (ZESTRIL) 5 MG tablet Take 5 mg by mouth 2 times daily      rivaroxaban ANTICOAGULANT (XARELTO) 10 MG TABS tablet Take 15 mg by mouth daily (with dinner)     sildenafil (REVATIO) 20 MG tablet Take 1 tablet (20 mg) by mouth daily as needed (sexual activity) Take 1-5 tabs, 30-60 minutes prior to  "sexual activity. (Patient not taking: Reported on 9/20/2022)     vitamin E (TOCOPHEROL) 100 UNIT capsule Take 100 Units by mouth twice a week     Zinc Gluconate 100 MG TABS Take 1 tablet by mouth twice a week      No current facility-administered medications for this visit.       ALLERGIES:  Dust mites, Lactose, and Mold      GENERAL PHYSICAL EXAM:     BP (!) 138/90   Pulse 59   Ht 1.753 m (5' 9\")   Wt 79.4 kg (175 lb)   BMI 25.84 kg/m     Constitutional: No acute distress. Well nourished.   PSYCH: normal mood and affect.  NEURO: normal gait, no focal deficits.   EYES: anicteric, EOMI, PERR.  CARDIOPULMONARY: breathing non-labored, pulse regular rate/rhythm, no peripheral edema  MUSCULOSKELETAL: normal limb proportions, no muscle wasting, no contractures.  SKIN: Normal virilized hair distribution, no lesions, warts or rashes over genitalia, abdomen extremities or face.    Imaging/labs:  Lab Results   Component Value Date    CR 1.14 02/05/2022    CR 1.22 08/03/2021    CR 1.03 10/23/2018    CR 1.22 09/28/2018    CR 0.98 09/07/2018     Lab Results   Component Value Date    PSA <0.01 09/12/2022    PSA <0.01 05/17/2022    PSA 15.10 10/12/2021    PSA 14.60 08/03/2021    PSA 9.96 12/18/2018    PSA 13.80 09/07/2018    PSA 11.40 06/13/2018    PSA 11.90 01/12/2018    PSA 10.70 12/05/2017       ASSESSMENT:   Erectile Dysfunction (ED) after RALP for prostate cancer.  - trialed sildenafil without response  - discussed options including VCD, ICI, MUSE, PDE5, and IPP  - interested in trialing ICI   - also discussed correctable risk factors for vascular disease, where applicable, and discussed preventative cardiology     - schedule for ICI teaching session       I saw and examined the patient with the resident today.  I agree with the resident note and plan of care as above.   Return to clinic for Edex teaching, recommend 20mcg Edex starting dose.    Ruben Ellis MD  Urology Staff         Additional Coding " Information:    Problems:  4 -- one or more chronic illnesses with exacerbation or side effects    Data Reviewed  Several PSA tests reviewed.  Undetectable at present status-post robot-assisted laparoscopic prostatectomy.    Tests ordered/pending: N/A     Notes from other providers reviewed: N/A     Level of risk:  4 -- prescription drug management    Time spent:  15 minutes spent on the date of the encounter doing chart review, history and exam, documentation and further activities per the note      Ruben Ellis MD

## 2022-11-17 NOTE — NURSING NOTE
"Chief Complaint   Patient presents with     Consult     Erectile Dysfunction after RALP       Blood pressure (!) 138/90, pulse 59, height 1.753 m (5' 9\"), weight 79.4 kg (175 lb). Body mass index is 25.84 kg/m .    Patient Active Problem List   Diagnosis     Hyperlipidemia LDL goal <70     Allergic rhinitis due to dust     Paroxysmal atrial fibrillation (H)     History of CVA (cerebrovascular accident)     Advanced directives, counseling/discussion     Prostate cancer (H)     History of colonic polyps     Hypertension goal BP (blood pressure) < 140/90     BPH with obstruction/lower urinary tract symptoms     Atrioventricular canal (AVC), incomplete     Memory problem     Urinary incontinence       Allergies   Allergen Reactions     Dust Mites      Lactose Unknown     Mold        Current Outpatient Medications   Medication Sig Dispense Refill     Ascorbic Acid (VITAMIN C PO) Take 1 tablet by mouth twice a week        B Complex Vitamins (VITAMIN B COMPLEX PO) Take 1 tablet by mouth twice a week        lisinopril (ZESTRIL) 5 MG tablet Take 5 mg by mouth 2 times daily        rivaroxaban ANTICOAGULANT (XARELTO) 10 MG TABS tablet Take 15 mg by mouth daily (with dinner)       sildenafil (REVATIO) 20 MG tablet Take 1 tablet (20 mg) by mouth daily as needed (sexual activity) Take 1-5 tabs, 30-60 minutes prior to sexual activity. (Patient not taking: Reported on 9/20/2022) 30 tablet 11     vitamin E (TOCOPHEROL) 100 UNIT capsule Take 100 Units by mouth twice a week       Zinc Gluconate 100 MG TABS Take 1 tablet by mouth twice a week          Social History     Tobacco Use     Smoking status: Never     Smokeless tobacco: Never   Substance Use Topics     Alcohol use: Not Currently     Alcohol/week: 0.0 standard drinks     Drug use: No       Dagoberto Van, EMT  11/17/2022  10:01 AM  "

## 2022-11-17 NOTE — PROGRESS NOTES
I am seeing Andre Alas in consultation from Dr. Pink for evaluation of erectile dysfunction.    HPI:  Pt seen in consultation for erectile dysfunction.  History of the erectile dysfunction is as follows:  S/p RALP with Dr. Pink for 3+4=7 prostate cancer in 02/2022, bilateral nerve-sparing. Trialed sildenafil post-op without any response. Presurgery, fairly good erectile function, no prior use of oral PDE5i  medications.     ED/Vascular disease risk factors:  HTN: Yes  Hyperlipidemia: Yes  Smoking: No   DM: No  Cardiovasciular disease: Known  Meds associated with ED that he's taking: None  Anxiety/anger/depression: No  Penile Plaques or curvature: none.     Libido: present, though decreased with age     REVIEW OF SYSTEMS:  General: negative  Skin: negative  Eyes: negative  Ears/Nose/Throat: negative  Respiratory: negative  Cardiovascular: negative  Gastrointestinal: negative  Genitourinary: see HPI  Musculoskeletal: negative  Neurologic: negative  Psychiatric: negative  Hematologic/Lymphatic/Immunologic: negative  Endocrine: negative    PAST MEDICAL HX:  Past Medical History:   Diagnosis Date     A-fib (H)      Allergic rhinitis due to dust 11/29/2011     Atrioventricular canal (AVC), incomplete      BPH without urinary obstruction 11/29/2011     Complication of anesthesia      CVA (cerebral vascular accident) (H) 07/2014    embolic 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     Hernia, abdominal      History of thrombophlebitis      Hyperlipidemia LDL goal <70 11/28/2011     Hypertension goal BP (blood pressure) < 140/90      Mumps      NSTEMI (non-ST elevated myocardial infarction) (H) 7/2014    prob sec to PAF -- no sig CAD     PAF (paroxysmal atrial fibrillation) (H) 7/2014     Palpitations      Prostate cancer (H) 02/2018       PAST SURG HX:  Past Surgical History:   Procedure Laterality Date     ANGIOGRAM  2014    normal - non ST MI     BLADDER SURGERY       COLONOSCOPY       CYSTOSCOPY       DAVINCI PROSTATECTOMY,  LYMPHADENECTOMY N/A 2/4/2022    Procedure: ROBOTIC ASSISTED LAPAROSCOPIC RADICAL PROSTATECTOMY BILATERAL PELVIC LYMPHADENECTOMY;  Surgeon: Akil Pink MD;  Location: [facility] OR     DENTAL SURGERY  2000     HERNIA REPAIR, INGUINAL RT/LT  1952     LASER HOLMIUM ENUCLEATION PROSTATE N/A 9/27/2018    Procedure: LASER HOLMIUM ENUCLEATION PROSTATE;  Holmium Laser Enucleation Of The Prostate;  Surgeon: Henry Oliveros MD;  Location: [facility] OR     PROSTATE SURGERY          FAMILY HX:  Family History   Problem Relation Age of Onset     Heart Failure Mother 73     Cerebrovascular Disease Father 70     Heart Disease Father 91        Pacemaker     Kidney Disease Father         stones      No Known Problems Brother      No Known Problems Sister      Cancer - colorectal No family hx of      Prostate Cancer No family hx of      Diabetes No family hx of      Hypertension No family hx of        SOCIAL HX:  Social History     Tobacco Use     Smoking status: Never     Smokeless tobacco: Never   Substance Use Topics     Alcohol use: Not Currently     Alcohol/week: 0.0 standard drinks     Drug use: No       MEDICATIONS:  Current Outpatient Medications   Medication Sig     Ascorbic Acid (VITAMIN C PO) Take 1 tablet by mouth twice a week      B Complex Vitamins (VITAMIN B COMPLEX PO) Take 1 tablet by mouth twice a week      lisinopril (ZESTRIL) 5 MG tablet Take 5 mg by mouth 2 times daily      rivaroxaban ANTICOAGULANT (XARELTO) 10 MG TABS tablet Take 15 mg by mouth daily (with dinner)     sildenafil (REVATIO) 20 MG tablet Take 1 tablet (20 mg) by mouth daily as needed (sexual activity) Take 1-5 tabs, 30-60 minutes prior to sexual activity. (Patient not taking: Reported on 9/20/2022)     vitamin E (TOCOPHEROL) 100 UNIT capsule Take 100 Units by mouth twice a week     Zinc Gluconate 100 MG TABS Take 1 tablet by mouth twice a week      No current facility-administered medications for this visit.       ALLERGIES:  Dust mites,  "Lactose, and Mold      GENERAL PHYSICAL EXAM:     BP (!) 138/90   Pulse 59   Ht 1.753 m (5' 9\")   Wt 79.4 kg (175 lb)   BMI 25.84 kg/m     Constitutional: No acute distress. Well nourished.   PSYCH: normal mood and affect.  NEURO: normal gait, no focal deficits.   EYES: anicteric, EOMI, PERR.  CARDIOPULMONARY: breathing non-labored, pulse regular rate/rhythm, no peripheral edema  MUSCULOSKELETAL: normal limb proportions, no muscle wasting, no contractures.  SKIN: Normal virilized hair distribution, no lesions, warts or rashes over genitalia, abdomen extremities or face.    Imaging/labs:  Lab Results   Component Value Date    CR 1.14 02/05/2022    CR 1.22 08/03/2021    CR 1.03 10/23/2018    CR 1.22 09/28/2018    CR 0.98 09/07/2018     Lab Results   Component Value Date    PSA <0.01 09/12/2022    PSA <0.01 05/17/2022    PSA 15.10 10/12/2021    PSA 14.60 08/03/2021    PSA 9.96 12/18/2018    PSA 13.80 09/07/2018    PSA 11.40 06/13/2018    PSA 11.90 01/12/2018    PSA 10.70 12/05/2017       ASSESSMENT:   Erectile Dysfunction (ED) after RALP for prostate cancer.  - trialed sildenafil without response  - discussed options including VCD, ICI, MUSE, PDE5, and IPP  - interested in trialing ICI   - also discussed correctable risk factors for vascular disease, where applicable, and discussed preventative cardiology     - schedule for ICI teaching session       I saw and examined the patient with the resident today.  I agree with the resident note and plan of care as above.   Return to clinic for Edex teaching, recommend 20mcg Edex starting dose.    Ruben Ellis MD  Urology Staff         Additional Coding Information:    Problems:  4 -- one or more chronic illnesses with exacerbation or side effects    Data Reviewed  Several PSA tests reviewed.  Undetectable at present status-post robot-assisted laparoscopic prostatectomy.    Tests ordered/pending: N/A     Notes from other providers reviewed: N/A     Level of risk:  4 -- " prescription drug management    Time spent:  15 minutes spent on the date of the encounter doing chart review, history and exam, documentation and further activities per the note

## 2022-11-22 ENCOUNTER — TELEPHONE (OUTPATIENT)
Dept: UROLOGY | Facility: CLINIC | Age: 72
End: 2022-11-22

## 2022-11-22 NOTE — TELEPHONE ENCOUNTER
[facility] Call Center    Phone Message    May a detailed message be left on voicemail: yes     Reason for Call: Appointment Intake    Referring Provider Name: Dr. Ellis  Diagnosis and/or Symptoms: schedule a nurse visit with Suzanne for injection teaching.    Patient is asking for a call back to get scheduled. Please call to advise.     Action Taken: Other: Urology     Travel Screening: Not Applicable

## 2022-11-29 NOTE — TELEPHONE ENCOUNTER
Called patient and let him know about date and time of appointment. He is not on Mychart so I will mail out info

## 2022-12-15 ENCOUNTER — ALLIED HEALTH/NURSE VISIT (OUTPATIENT)
Dept: UROLOGY | Facility: CLINIC | Age: 72
End: 2022-12-15
Payer: COMMERCIAL

## 2022-12-15 DIAGNOSIS — N52.31 ERECTILE DYSFUNCTION AFTER RADICAL PROSTATECTOMY: Primary | ICD-10-CM

## 2022-12-15 PROCEDURE — 99211 OFF/OP EST MAY X REQ PHY/QHP: CPT

## 2022-12-15 NOTE — NURSING NOTE
Chief Complaint   Patient presents with     Clinic Care Coordination - Face To Face     Injection teaching       Patient Active Problem List   Diagnosis     Hyperlipidemia LDL goal <70     Allergic rhinitis due to dust     Paroxysmal atrial fibrillation (H)     History of CVA (cerebrovascular accident)     Advanced directives, counseling/discussion     Prostate cancer (H)     History of colonic polyps     Hypertension goal BP (blood pressure) < 140/90     BPH with obstruction/lower urinary tract symptoms     Atrioventricular canal (AVC), incomplete     Memory problem     Urinary incontinence       Allergies   Allergen Reactions     Dust Mites      Lactose Unknown     Mold        Current Outpatient Medications   Medication Sig Dispense Refill     Ascorbic Acid (VITAMIN C PO) Take 1 tablet by mouth twice a week        B Complex Vitamins (VITAMIN B COMPLEX PO) Take 1 tablet by mouth twice a week        lisinopril (ZESTRIL) 5 MG tablet Take 5 mg by mouth 2 times daily        rivaroxaban ANTICOAGULANT (XARELTO) 10 MG TABS tablet Take 15 mg by mouth daily (with dinner)       sildenafil (REVATIO) 20 MG tablet Take 1 tablet (20 mg) by mouth daily as needed (sexual activity) Take 1-5 tabs, 30-60 minutes prior to sexual activity. (Patient not taking: Reported on 9/20/2022) 30 tablet 11     vitamin E (TOCOPHEROL) 100 UNIT capsule Take 100 Units by mouth twice a week       Zinc Gluconate 100 MG TABS Take 1 tablet by mouth twice a week          Social History     Tobacco Use     Smoking status: Never     Smokeless tobacco: Never   Substance Use Topics     Alcohol use: Not Currently     Alcohol/week: 0.0 standard drinks     Drug use: No       Andre Alas comes into clinic today at the request of Ruben Ellis for intracavernosal injection teaching.    Diagnosis: erectile dysfunction post radical prostatectomy  Incidence:  This service provided today was under the direct supervision of Dr Ruben Ellis, who was available if  needed.    Andre ESCUDERO Pankajtrey presents to clinic for Edex injection teaching.  Edex informational patient packet was read and reviewed in clinic by patient.  Reviewed usage and possible side effects.  Questions answered appropriately.  Patient injected 20 mcg mcg without assistance.  After 20 minutes, patient reassessed.  Patient rates his erection a 8/10. Patient stated that it would be firm enough for penetration.  Patient educated on alternating sites for injection, left and right side of shaft. Also discussed not to use more than 3 times per week, at least 24 hours between each injection. Also discussed prolonged erections and need to go to ER if that happens.  Patient verbalized understanding. No further questions.  Patient to call if he has any questions or concerns.  Prescription for Edex 20 mcg.    The following medication was given:     MEDICATION:  Edex  ROUTE: intracavitary  SITE: Right penis   DOSE: 20 mcg  LOT #: [id number]  : [employer]  EXPIRATION DATE: 04/2024  NDC#: 14319-624-36   Was there drug waste? No    Prior to injection, verified patient identity using patient's name and date of birth.  Due to injection administration, patient instructed to remain in clinic for 15 minutes  afterwards, and to report any adverse reaction to me immediately.    Drug Amount Wasted:  None.  Vial/Syringe: Single dose vial      Suzanne Heredia, RN, BSN

## 2022-12-26 ENCOUNTER — TELEPHONE (OUTPATIENT)
Dept: UROLOGY | Facility: CLINIC | Age: 72
End: 2022-12-26

## 2022-12-29 ENCOUNTER — PRE VISIT (OUTPATIENT)
Dept: UROLOGY | Facility: CLINIC | Age: 72
End: 2022-12-29

## 2022-12-29 NOTE — TELEPHONE ENCOUNTER
Reason for visit: PSA recheck     Relevant information: Prostate cancer s/p RALP 2/2022    Records/imaging/labs/orders: PSA 1/10    Pt called: N/A    At Rooming: Standard

## 2023-01-10 ENCOUNTER — LAB (OUTPATIENT)
Dept: LAB | Facility: CLINIC | Age: 73
End: 2023-01-10
Payer: COMMERCIAL

## 2023-01-10 DIAGNOSIS — C61 PROSTATE CANCER (H): ICD-10-CM

## 2023-01-10 LAB — PSA SERPL-MCNC: 0.02 NG/ML (ref 0–6.5)

## 2023-01-10 PROCEDURE — 84153 ASSAY OF PSA TOTAL: CPT

## 2023-01-10 PROCEDURE — 36415 COLL VENOUS BLD VENIPUNCTURE: CPT

## 2023-01-17 ENCOUNTER — OFFICE VISIT (OUTPATIENT)
Dept: UROLOGY | Facility: CLINIC | Age: 73
End: 2023-01-17
Payer: COMMERCIAL

## 2023-01-17 VITALS
BODY MASS INDEX: 25.33 KG/M2 | HEART RATE: 70 BPM | SYSTOLIC BLOOD PRESSURE: 146 MMHG | DIASTOLIC BLOOD PRESSURE: 92 MMHG | HEIGHT: 69 IN | WEIGHT: 171 LBS

## 2023-01-17 DIAGNOSIS — C61 PROSTATE CANCER (H): Primary | ICD-10-CM

## 2023-01-17 PROCEDURE — 99213 OFFICE O/P EST LOW 20 MIN: CPT | Performed by: UROLOGY

## 2023-01-17 ASSESSMENT — PAIN SCALES - GENERAL: PAINLEVEL: NO PAIN (0)

## 2023-01-17 NOTE — PROGRESS NOTES
"  CHIEF COMPLAINT   It was my pleasure to see Andre Alas who is a 72 year old male for follow-up of Prostate Cancer.      HPI  Andre Alas is a very pleasant 72 year old male who presents with a history of Prostate Cancer. RALP 2/4/2022. History was significant for HoLEP prior to prostatectomy. Had Aleksandar 3+4=7 disease.   Noting good progress with regard to continence. Still with some leakage when fatigued. Wearing a light pad, but overall pleased.  Minimal erectile function to date, despite sildenafil. Met with Dr. Ellis and tried injections, but not pleased with this.      PSA  1/10/2023 - 0.02  9/12/2022 - <0.01  5/17/2022 - <0.01     Surgical Path 2/4/22  A.  Radical prostatectomy:  (SEE SYNOPTIC DIAGNOSIS)  - Prostatic adenocarcinoma, small acinar cell type:    - WHO Grade Group 2 (see comment)       - [place] score 7 , patterns 3+4 (<1% of pattern 4, preponderant pattern 3 tumor)    - Proportion of prostatic tissue involved by tumor:  5%    - Size of dominant nodule:  7.1 x 6.6 x 4.6 cm    - Intraductal carcinoma: None identified    - Perineural invasion: PRESENT (isolated, block A4)    - Extraprostatic extension: None identified    - Seminal vesicle and vas involvement: None identified    - Lymphovascular invasion: None identified    - Margins of resection: Uninvolved    - Stage:  pT2 pN0    B.  Right and left pelvic regional lymph nodes:  - Negative for malignancy in each of 5 lymph nodes identified.    C.  Right bladder neck margin:  - Negative for malignancy.    PHYSICAL EXAM  Patient is a 72 year old  male   Vitals: Blood pressure (!) 146/92, pulse 70, height 1.753 m (5' 9\"), weight 77.6 kg (171 lb).  General Appearance Adult: Body mass index is 25.25 kg/m .  Alert, no acute distress, oriented  Lungs: no respiratory distress, or pursed lip breathing  Abdomen: soft, nontender, no organomegaly or masses  Back: no CVAT  Neuro: Alert, oriented, speech and mentation normal  Psych: affect and mood " normal    Outside and Past Medical records:  Review of the result(s) of each unique test - PSA     ASSESSMENT and PLAN  72 year old male with stage pT2N0, Aleksandar 3+4=7 prostate cancer, s/p RALP completed 2/4/2022. Negative margins. His PSA is 0.02 at this time, after initially going to undetectable. We discussed the possible causes of this, including biochemical recurrence, as well as the possibility that some patients will have low levels of detectable PSA even after prostatectomy. Should his PSA continue to rise, would consider salvage radiotherapy. Given his T2 pathology and negative margins, however, ongoing surveillance would be reasonable at this time.  Noting progressive improvement with continence.  Is having minimal return of erectile function despite sildenafil and is interested in exploring options, including ICI, and IPP. He would like referral to Dr. Ellis to discuss further     - Follow-up with Dr. Ellis to discuss ED  - Follow-up 3 months with PSA    22 minutes spent on the date of the encounter doing chart review, history and exam, documentation and further activities as noted above.    Akil Pink MD  Urology  [facility]

## 2023-01-17 NOTE — NURSING NOTE
"Chief Complaint   Patient presents with     Follow Up     PSA check       Blood pressure (!) 146/92, pulse 70, height 1.753 m (5' 9\"), weight 77.6 kg (171 lb). Body mass index is 25.25 kg/m .    Patient Active Problem List   Diagnosis     Hyperlipidemia LDL goal <70     Allergic rhinitis due to dust     Paroxysmal atrial fibrillation (H)     History of CVA (cerebrovascular accident)     Advanced directives, counseling/discussion     Prostate cancer (H)     History of colonic polyps     Hypertension goal BP (blood pressure) < 140/90     BPH with obstruction/lower urinary tract symptoms     Atrioventricular canal (AVC), incomplete     Memory problem     Urinary incontinence       Allergies   Allergen Reactions     Dust Mites      Lactose Unknown     Mold        Current Outpatient Medications   Medication Sig Dispense Refill     Ascorbic Acid (VITAMIN C PO) Take 1 tablet by mouth twice a week        B Complex Vitamins (VITAMIN B COMPLEX PO) Take 1 tablet by mouth twice a week        lisinopril (ZESTRIL) 5 MG tablet Take 5 mg by mouth 2 times daily        rivaroxaban ANTICOAGULANT (XARELTO) 10 MG TABS tablet Take 15 mg by mouth daily (with dinner)       sildenafil (REVATIO) 20 MG tablet Take 1 tablet (20 mg) by mouth daily as needed (sexual activity) Take 1-5 tabs, 30-60 minutes prior to sexual activity. 30 tablet 11     vitamin E (TOCOPHEROL) 100 UNIT capsule Take 100 Units by mouth twice a week (Patient not taking: Reported on 1/17/2023)       Zinc Gluconate 100 MG TABS Take 1 tablet by mouth twice a week  (Patient not taking: Reported on 1/17/2023)         Social History     Tobacco Use     Smoking status: Never     Smokeless tobacco: Never   Substance Use Topics     Alcohol use: Not Currently     Alcohol/week: 0.0 standard drinks     Drug use: No       Joelle Sims  1/17/2023  2:58 PM  " Negative

## 2023-01-17 NOTE — PATIENT INSTRUCTIONS
Please follow-up with Dr. Pink in three months with a PSA prior.    It was a pleasure meeting with you today.  Thank you for allowing me and my team the privilege of caring for you today.  YOU are the reason we are here, and I truly hope we provided you with the excellent service you deserve.  Please let us know if there is anything else we can do for you so that we can be sure you are leaving completely satisfied with your care experience.

## 2024-02-15 NOTE — ANESTHESIA PREPROCEDURE EVALUATION
Anesthesia Evaluation     . Pt has had prior anesthetic. Type: MAC and General    No history of anesthetic complications          ROS/MED HX    ENT/Pulmonary:  - neg pulmonary ROS     Neurologic:     (+)migraines, CVA date: 2014 x 2 without deficits    Cardiovascular: Comment: -History of MI in 2014 - cath showed normal coronaries, MI thought to be related to the A-fib.      - AV canal defect/gerbode defect/ASD        (+) ----. : . . . :. dysrhythmias a-fib, . Previous cardiac testing Echodate:9/27/2018results:Final Impressions:   1. Partial AV-canal defect with small septum primum ASD. There is also a defect/fenestration of the atrioventricular septum leading to LV-RA (and also LA) communication consistent with Gerbode defect. The septal leaflet of the tricuspid valve and the anterior leaflet of the mitral valve insert at the same level. There is no VSD. These findings are better appreciated on prior Echo dated 9/2/16.   2. LVEF estimate 60-65%. Normal LV size. Moderate concentric LVH.   3. Normal RV size and global systolic function.   4. Mild mitral and mild aortic regurgitation.   5. Mild biatrial enlargement.   6. Normal PA systolic and RA pressure estimates.   7. Mildly dilated aortic root (3.8 cm).    Chamber Sizes and Function  Moderate concentric left ventricular hypertrophy. Left atrial size is mildly enlarged. Right ventricular cavity size is normal, global systolic RV function is normal. The right atrium is mildly enlarged. Right atrial volume index is 25 ml/m . The pulmonary artery is of normal size and origin. The sinus of Valsalva is dilated. The ascending aorta is normal sized. The aortic arch is normal.date: results: date: results:Cath date: 2014 results:Summary/Conclusions  PRESENTATION / INDICATIONS   Acute ST Elevation Myocardial Infarction   Chest Pain    DIAGNOSTIC SUMMARY   The LAD is widely patent with mild mid-stenosis   The Cx is widely patent   The RCA is widely patent (dominant)   New  Afib    LEFT VENTRICULAR FUNCTION   Left ventricular ejection fraction based on LV Gram is 75%. LV   Pressure = 110/5.    SPECIAL PROCEDURES   Right femoral arteriotomy  was successfully closed utilizing a   closure device    RECOMMENDATIONS & PLAN   No culprit identified at angiography. Somewhat slow flow in   coronary arteries may be secondary to a-fib.      Pre-Operative Diagnosis / Indications   Chest Pain    Acute ST Elevation Myocardial Infarction           METS/Exercise Tolerance:  >4 METS   Hematologic:  - neg hematologic  ROS       Musculoskeletal:  - neg musculoskeletal ROS       GI/Hepatic:  - neg GI/hepatic ROS       Renal/Genitourinary:     (+) BPH, urinary retention      Endo:  - neg endo ROS       Psychiatric:  - neg psychiatric ROS       Infectious Disease:  - neg infectious disease ROS       Malignancy:   (+) Malignancy History of Prostate  Prostate CA Active status post,         Other:    (+) no H/O Chronic Pain,                   Physical Exam  Normal systems: cardiovascular, pulmonary and dental    Airway   Mallampati: II  TM distance: >3 FB  Neck ROM: full    Dental     Cardiovascular   Rhythm and rate: regular and normal      Pulmonary    breath sounds clear to auscultation                 PAC Discussion and Assessment    ASA Classification: 3  Case is suitable for: [facility]  Anesthetic techniques and relevant risks discussed: GA  Invasive monitoring and risk discussed:   Types:   Possibility and Risk of blood transfusion discussed:   NPO instructions given:   Additional anesthetic preparation and risks discussed:   Needs early admission to pre-op area:   Other:     PAC Resident/NP Anesthesia Assessment:  Andre Alas is a 68 year old male scheduled for Holmium Laser Enucleation Of The Prostate on 9/7/2018 by Dr. Oliveros in treatment of urinary retention in the setting of prostate cancer.  PAC referral for risk assessment and optimization for anesthesia with comorbid conditions of: AV canal  Lightheadedness defect, gerbode defect, ASD, atrial fibrillation, old MI, history of CVA and BPH.    Pre-operative considerations:  1.  Cardiac:  Functional status- METS >4.  He exercises every day doing multiple strength exercises and walking 3 miles.  He was prescribed Xarelto for A-fib, but reports that he stopped taking it in the end of July after he continued to bleed after a cystoscopy procedure.  He didn't notify his cardiologist and reports he doesn't want to restart it or any other anticoagulant.  He reports that he feels much better off of the medication.  Discussed at length today and Dr. Day was able to convince the patient to restart his Xarelto due to high risk without it.  CHADSVasc = 4.  Instructed to stop Xarelto 48 hours prior to surgery.  He had a MI in 2014 diagnosed apparently based on elevated troponin levels as he was taken to the cath lab and he had no CAD.  It was noted that the MI was likely related to the new onset A-fib at that time.  He saw his cardiologist that is following the above listed cardiac defects and A-fib last on 9/27/2017.  They noted him as stable and recommended follow up with cardiac MRI in 1 year.  The VSD is not surgically repairable based on location.  He currently denies any concerning cardiac symptoms.  His last echo on 9/27/2018 showed an EF of 60-65%, the above defects, mild aortic regurgitation and LVH.  Intermediate risk surgery with 6.6% risk of major adverse cardiac event.   2.  Pulm:  Airway feasible.  JASWINDER risk: low.  3.  GI:  Risk of PONV score = 2.  If > 2, anti-emetic intervention recommended.  4. Neuro:  History of CVA x 2 in 2014.  Denies any residual effects.  5. :  Self caths twice per day in addition to normal urination for urinary retention.    VTE risk: 3%    Patient is optimized and is acceptable candidate for the proposed procedure.  No further diagnostic evaluation is needed.     Patient also evaluated by Dr. Day. See recommendations below.     **For  further details of assessment, testing, and physical exam please see H and P completed on same date.          Naomy Wilks DNP, RN, APRN      Reviewed and Signed by PAC Mid-Level Provider/Resident  Mid-Level Provider/Resident: Naomy Wilks DNP, RN, APRN  Date: 9/7/2018  Time: 1621    Attending Anesthesiologist Anesthesia Assessment:  STAFF:  Very robust 68 y.o. man with prostate disease for laser enucleation by Dr. Oliveros  using general anesthesia.   History summarized above.  Of note =   1. 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  Septal defect is NOT amenable to a device closure.    2. Patient had 2 prior (presumably embolic) strokes.  Now recovered and should be on Xarelto (15 mg).  He is not always compliant with this, but we encouraged him to take the Xarelto until 2 - 3 days prior to his upcoming surgery, and then hold it.   Other elements noted above.   Instructions given and questions answered.   Final plans by anesthesiology team on DOS.   ---rcp      Reviewed and Signed by PAC Anesthesiologist  Anesthesiologist: rcp  Date: 9/7/2018  Time:   Pass/Fail: Pass  Disposition:     PAC Pharmacist Assessment:        Pharmacist:   Date:   Time:      Anesthesia Plan      History & Physical Review  History and physical reviewed and following examination; no interval change.    ASA Status:  3 .    NPO Status:  > 8 hours and > 4 hours    Plan for General and LMA with Intravenous induction. Maintenance will be Balanced.    PONV prophylaxis:  Ondansetron (or other 5HT-3) and Dexamethasone or Solumedrol      ASSESSMENT & PLAN:  - ASA 3  - GETA with standard ASA monitors, IV induction, and balanced anesthetic  - PIV  - Antibiotics per surgery  - PONV prophylaxis  - Blood products available, possible administration discussed with patient  - Relevant risks, benefits, alternatives and the anesthetic plan was discussed.  All questions were answered and  there was agreement to proceed.    Parisa Odonnell CA-1          Postoperative Care  Postoperative pain management:  IV analgesics and Oral pain medications.      Consents  Anesthetic plan, risks, benefits and alternatives discussed with:  Patient and Spouse..                          .

## (undated) DEVICE — ENDO POUCH UNIV RETRIEVAL SYSTEM INZII 10MM CD001

## (undated) DEVICE — BAG URINARY DRAIN LUBRISIL IC 4000ML LF 253509A

## (undated) DEVICE — PACK DAVINCI UROLOGY SBA15UDFSG

## (undated) DEVICE — SOL WATER IRRIG 1000ML BOTTLE 2F7114

## (undated) DEVICE — BLADE MORCELLATOR WOLF PIRANHA 4.75X385MM 49700103

## (undated) DEVICE — DAVINCI XI FCP BIPOLAR FENESTRATED 470205

## (undated) DEVICE — ESU GROUND PAD UNIVERSAL W/O CORD

## (undated) DEVICE — DAVINCI XI SEAL UNIVERSAL 5-8MM 470361

## (undated) DEVICE — DAVINCI XI DRAPE COLUMN 470341

## (undated) DEVICE — DRSG GAUZE 4X8" NON21842

## (undated) DEVICE — FILTER PIRANHA DISP 2228.901

## (undated) DEVICE — SOL NACL 0.9% IRRIG 1000ML BOTTLE 2F7124

## (undated) DEVICE — NDL INSUFFLATION 120MM VERRES 172015

## (undated) DEVICE — PAD CHUX UNDERPAD 30X36" P3036C

## (undated) DEVICE — TAPE CLOTH 3" CARDINAL 3TRCL03

## (undated) DEVICE — JELLY LUBRICATING SURGILUBE 2OZ TUBE 0281-0205-02

## (undated) DEVICE — SU VICRYL 4-0 RB-1 27" J304

## (undated) DEVICE — LINEN GOWN X4 5410

## (undated) DEVICE — SPECIMEN TRAP TISSUE CONTAINER PIRANHA 2208120

## (undated) DEVICE — CATH HOLDER STRAP 36600

## (undated) DEVICE — TUBING IRRIG CYSTO/BLADDER SET 81" LF 2C4040

## (undated) DEVICE — SYR 70ML TOOMEY 041170

## (undated) DEVICE — DAVINCI XI MONOPOLAR SCISSORS HOT SHEARS 8MM 470179

## (undated) DEVICE — CATH FOLEY 20FR 5ML SILVER COAT LATEX 0165SI20

## (undated) DEVICE — KIT PATIENT POSITIONING PIGAZZI LATEX FREE 40580

## (undated) DEVICE — SU SILK 2-0 FSL 18" 677G

## (undated) DEVICE — DRSG ABDOMINAL 07 1/2X8" 7197D

## (undated) DEVICE — DAVINCI XI NDL DRIVER LARGE 470006

## (undated) DEVICE — SYR 10ML FINGER CONTROL W/O NDL 309695

## (undated) DEVICE — CATH LASER URETERAL 7.1FRX40CM G17797  022403-7.1-40

## (undated) DEVICE — CATH FOLEY 3WAY 22FR 30ML SIL 73022SI

## (undated) DEVICE — SYR 50ML LL W/O NDL 309653

## (undated) DEVICE — LINEN TOWEL PACK X5 5464

## (undated) DEVICE — SU VICRYL 0 UR-6 27" J603H

## (undated) DEVICE — ADH SKIN CLOSURE PREMIERPRO EXOFIN 1.0ML 3470

## (undated) DEVICE — PROTECTOR ARM ONE-STEP TRENDELENBURG 40418

## (undated) DEVICE — STRAP KNEE/BODY 31143004

## (undated) DEVICE — SPONGE RAY-TEC 4X8" 7318

## (undated) DEVICE — DAVINCI XI DRAPE ARM 470015

## (undated) DEVICE — Device

## (undated) DEVICE — GLOVE PROTEXIS W/NEU-THERA 7.5  2D73TE75

## (undated) DEVICE — TUBING SUCTION MEDI-VAC 1/4"X20' N620A

## (undated) DEVICE — SU WND CLOSURE V-LOC 3-0 CV-23 6" VLOCM1904

## (undated) DEVICE — DRAIN JACKSON PRATT RESERVOIR 100ML SU130-1305

## (undated) DEVICE — SU VICRYL 0 CT-1 27" J340H

## (undated) DEVICE — TUBING CONMED AIRSEAL SMOKE EVAC INSUFFLATION ASM-EVAC

## (undated) DEVICE — SU MONOCRYL 4-0 PS-2 18" UND Y496G

## (undated) DEVICE — SOL NACL 0.9% INJ 1000ML BAG 2B1324X

## (undated) DEVICE — DAVINCI HOT SHEARS TIP COVER  400180

## (undated) DEVICE — DRAIN JACKSON PRATT CHANNEL 19FR ROUND HUBLESS SIL JP-2230

## (undated) DEVICE — SUCTION IRR STRYKERFLOW II W/TIP 250-070-520

## (undated) DEVICE — GLOVE PROTEXIS BLUE W/NEU-THERA 6.5  2D73EB65

## (undated) DEVICE — TUBING SET PIRANHA 41702208

## (undated) DEVICE — SUCTION MANIFOLD DORNOCH ULTRA CART UL-CL500

## (undated) DEVICE — CLIP ENDO HEMO-LOC PURPLE LG 544240

## (undated) DEVICE — LIGHT HANDLE X2

## (undated) DEVICE — TROCAR AIRSEAL BLADELESS 12X120MM IAS12-120

## (undated) DEVICE — LASER FIBER HOLMIUM END FIRE SLIMLINE EZ550 M0068408940

## (undated) DEVICE — SUCTION WATERBUG FLOOR PUDDLE VAC 9321

## (undated) DEVICE — SU WND CLOSURE VLOC 90 ABS 3-0 VIOLET 6" CV-23 VLOCM0804

## (undated) RX ORDER — HYDROMORPHONE HCL IN WATER/PF 6 MG/30 ML
PATIENT CONTROLLED ANALGESIA SYRINGE INTRAVENOUS
Status: DISPENSED
Start: 2022-02-04

## (undated) RX ORDER — GLYCOPYRROLATE 0.2 MG/ML
INJECTION, SOLUTION INTRAMUSCULAR; INTRAVENOUS
Status: DISPENSED
Start: 2022-02-04

## (undated) RX ORDER — LIDOCAINE HYDROCHLORIDE 20 MG/ML
INJECTION, SOLUTION EPIDURAL; INFILTRATION; INTRACAUDAL; PERINEURAL
Status: DISPENSED
Start: 2022-02-04

## (undated) RX ORDER — FENTANYL CITRATE 50 UG/ML
INJECTION, SOLUTION INTRAMUSCULAR; INTRAVENOUS
Status: DISPENSED
Start: 2018-09-27

## (undated) RX ORDER — DEXAMETHASONE SODIUM PHOSPHATE 4 MG/ML
INJECTION, SOLUTION INTRA-ARTICULAR; INTRALESIONAL; INTRAMUSCULAR; INTRAVENOUS; SOFT TISSUE
Status: DISPENSED
Start: 2018-09-27

## (undated) RX ORDER — CEFAZOLIN SODIUM 1 G/3ML
INJECTION, POWDER, FOR SOLUTION INTRAMUSCULAR; INTRAVENOUS
Status: DISPENSED
Start: 2022-02-04

## (undated) RX ORDER — PROPOFOL 10 MG/ML
INJECTION, EMULSION INTRAVENOUS
Status: DISPENSED
Start: 2022-02-04

## (undated) RX ORDER — ONDANSETRON 2 MG/ML
INJECTION INTRAMUSCULAR; INTRAVENOUS
Status: DISPENSED
Start: 2018-09-27

## (undated) RX ORDER — PHENYLEPHRINE HCL IN 0.9% NACL 1 MG/10 ML
SYRINGE (ML) INTRAVENOUS
Status: DISPENSED
Start: 2018-09-27

## (undated) RX ORDER — ONDANSETRON 2 MG/ML
INJECTION INTRAMUSCULAR; INTRAVENOUS
Status: DISPENSED
Start: 2022-02-04

## (undated) RX ORDER — VECURONIUM BROMIDE 1 MG/ML
INJECTION, POWDER, LYOPHILIZED, FOR SOLUTION INTRAVENOUS
Status: DISPENSED
Start: 2022-02-04

## (undated) RX ORDER — FENTANYL CITRATE 50 UG/ML
INJECTION, SOLUTION INTRAMUSCULAR; INTRAVENOUS
Status: DISPENSED
Start: 2022-02-04

## (undated) RX ORDER — HEPARIN SODIUM 5000 [USP'U]/.5ML
INJECTION, SOLUTION INTRAVENOUS; SUBCUTANEOUS
Status: DISPENSED
Start: 2022-02-04

## (undated) RX ORDER — EPHEDRINE SULFATE 50 MG/ML
INJECTION, SOLUTION INTRAMUSCULAR; INTRAVENOUS; SUBCUTANEOUS
Status: DISPENSED
Start: 2018-09-27

## (undated) RX ORDER — NEOSTIGMINE METHYLSULFATE 1 MG/ML
VIAL (ML) INJECTION
Status: DISPENSED
Start: 2022-02-04

## (undated) RX ORDER — HYDROMORPHONE HYDROCHLORIDE 1 MG/ML
INJECTION, SOLUTION INTRAMUSCULAR; INTRAVENOUS; SUBCUTANEOUS
Status: DISPENSED
Start: 2022-02-04

## (undated) RX ORDER — PROPOFOL 10 MG/ML
INJECTION, EMULSION INTRAVENOUS
Status: DISPENSED
Start: 2018-09-27